# Patient Record
Sex: FEMALE | Race: WHITE | NOT HISPANIC OR LATINO | Employment: OTHER | URBAN - METROPOLITAN AREA
[De-identification: names, ages, dates, MRNs, and addresses within clinical notes are randomized per-mention and may not be internally consistent; named-entity substitution may affect disease eponyms.]

---

## 2017-01-18 ENCOUNTER — TELEPHONE (OUTPATIENT)
Dept: OBSTETRICS AND GYNECOLOGY | Facility: CLINIC | Age: 72
End: 2017-01-18

## 2017-01-18 NOTE — TELEPHONE ENCOUNTER
Call to pt regarding lab results she mailed to us - results scanned into chart 1/5/17. Per Dr. Lentz:  pt notified to continue current Ca++/Vit D regimen. Pt voices understanding.

## 2017-07-17 ENCOUNTER — TRANSCRIBE ORDERS (OUTPATIENT)
Dept: ADMINISTRATIVE | Facility: HOSPITAL | Age: 72
End: 2017-07-17

## 2017-07-17 DIAGNOSIS — Z12.31 VISIT FOR SCREENING MAMMOGRAM: Primary | ICD-10-CM

## 2017-07-25 ENCOUNTER — HOSPITAL ENCOUNTER (OUTPATIENT)
Dept: MAMMOGRAPHY | Facility: HOSPITAL | Age: 72
Discharge: HOME | End: 2017-07-25
Attending: OBSTETRICS & GYNECOLOGY

## 2017-07-25 DIAGNOSIS — Z12.31 VISIT FOR SCREENING MAMMOGRAM: ICD-10-CM

## 2017-08-15 ENCOUNTER — HOSPITAL ENCOUNTER (OUTPATIENT)
Dept: MAMMOGRAPHY | Facility: HOSPITAL | Age: 72
Discharge: HOME | End: 2017-08-15
Attending: OBSTETRICS & GYNECOLOGY | Admitting: OBSTETRICS & GYNECOLOGY

## 2017-08-15 PROCEDURE — 77063 BREAST TOMOSYNTHESIS BI: CPT | Performed by: RADIOLOGY

## 2017-08-15 PROCEDURE — 77063 BREAST TOMOSYNTHESIS BI: CPT

## 2017-08-15 PROCEDURE — G0202 SCR MAMMO BI INCL CAD: HCPCS

## 2017-08-15 PROCEDURE — G0202 SCR MAMMO BI INCL CAD: HCPCS | Performed by: RADIOLOGY

## 2017-10-05 ENCOUNTER — OFFICE VISIT (OUTPATIENT)
Dept: OBSTETRICS AND GYNECOLOGY | Facility: CLINIC | Age: 72
End: 2017-10-05

## 2017-10-05 VITALS
HEIGHT: 64 IN | BODY MASS INDEX: 20.55 KG/M2 | SYSTOLIC BLOOD PRESSURE: 110 MMHG | WEIGHT: 120.4 LBS | DIASTOLIC BLOOD PRESSURE: 72 MMHG

## 2017-10-05 DIAGNOSIS — N95.2 VAGINAL ATROPHY: ICD-10-CM

## 2017-10-05 DIAGNOSIS — M81.0 AGE-RELATED OSTEOPOROSIS WITHOUT CURRENT PATHOLOGICAL FRACTURE: ICD-10-CM

## 2017-10-05 DIAGNOSIS — Z78.0 MENOPAUSE: Primary | ICD-10-CM

## 2017-10-05 DIAGNOSIS — N60.11 FIBROCYSTIC BREAST CHANGES, BILATERAL: ICD-10-CM

## 2017-10-05 DIAGNOSIS — N60.12 FIBROCYSTIC BREAST CHANGES, BILATERAL: ICD-10-CM

## 2017-10-05 PROCEDURE — 99214 OFFICE O/P EST MOD 30 MIN: CPT | Performed by: OBSTETRICS & GYNECOLOGY

## 2017-10-05 NOTE — PROGRESS NOTES
Chief Complaint   Patient presents with   • Gynecologic Exam   • Med Refill       Holly Davis is a 71 y.o. year old  presenting to be seen because of follow-up for menopause, vaginal atrophy which is currently treated with Premarin vaginal cream, osteoporosis with a history of 2 stress fractures, and fibrocystic changes of the breast.  This patient takes Evista 60 mg daily.  She has partial response to Premarin vaginal cream.  She has had no recent fractures.       SCREENING TESTS    Year 2012   Age                         PAP                         HPV high risk                         Mammogram     benign benign                   SWATHI score                         Breast MRI                         Lipids                         Vitamin D                         Colonoscopy                         DEXA  Frax (hip/any)     osteopenia                    Ovarian Screen                             No Additional Complaints Reported    The following portions of the patient's history were reviewed and updated as appropriate:vital signs and   She  does not have any pertinent problems on file.  She  has a past surgical history that includes Breast excisional biopsy; Hysterectomy (Bilateral); Breast biopsy (Left); Total abdominal hysterectomy w/ bilateral salpingoophorectomy (Bilateral); Knee cartilage surgery; Cataract extraction w/ intraocular lens  implant, bilateral; Appendectomy; Back surgery; Colonoscopy; and knee arthroplasty, partial replacement (Right).  Her family history includes Breast cancer (age of onset: 60) in her cousin; Breast cancer (age of onset: 64) in her maternal aunt. There is no history of Ovarian cancer.  She  reports that she has never smoked. She has never used smokeless tobacco. She reports that she drinks alcohol. She reports that she does not use illicit drugs.  Current  "Outpatient Prescriptions   Medication Sig Dispense Refill   • Calcium-Magnesium-Vitamin D - MG-MG-UNIT tablet sustained-release 24 hour Take 1 tablet by mouth Daily.     • fluvastatin XL (LESCOL XL) 80 MG 24 hr tablet Take 1 tablet by mouth Daily.  0   • metoprolol succinate XL (TOPROL-XL) 25 MG 24 hr tablet Take 1 tablet by mouth Daily.  0   • Prasterone 6.5 MG insert Insert 1 suppository into the vagina Daily. 30 each 12   • Probiotic Product (PROBIOTIC DAILY PO) Take 1 tablet by mouth Daily.     • raloxifene (EVISTA) 60 MG tablet TAKE 1 TABLET BY MOUTH DAILY. 90 tablet 3   • RESTASIS 0.05 % ophthalmic emulsion Administer 1 drop to both eyes 2 (Two) Times a Day.  3   • senna-docusate (PERICOLACE) 8.6-50 MG per tablet Take 1 tablet by mouth Daily.     • temazepam (RESTORIL) 15 MG capsule Take 1 capsule by mouth As Needed.  1   • traZODone (DESYREL) 100 MG tablet Take 1 tablet by mouth Daily.  0   • Vitamin D, Cholecalciferol, 1000 UNITS capsule Take 1,000 Int'l Units by mouth Daily.       No current facility-administered medications for this visit.      She is allergic to cefdinir; midazolam; other; and sulfa antibiotics..        Review of Systems  A comprehensive review of systems was done.  Constitutional: negative for fever, chills, activity change, appetite change, fatigue and unexpected weight change.  Respiratory: negative  Cardiovascular: negative  Gastrointestinal: positive for constipation  Genitourinary:negative  Musculoskeletal:negative  Behavioral/Psych: negative          /72  Ht 64\" (162.6 cm)  Wt 120 lb 6.4 oz (54.6 kg)  LMP  (LMP Unknown)  BMI 20.67 kg/m2    Physical Exam    General:  alert; cooperative; well developed; well nourished   Skin:  No suspicious lesions seen   Thyroid: normal to inspection and palpation   Lungs:  clear to auscultation bilaterally   Heart:  regular rate and rhythm, S1, S2 normal, no murmur, click, rub or gallop   Breasts:  Examined in supine " position  Symmetric without masses or skin dimpling  Nipples normal without inversion, lesions or discharge  There are no palpable axillary nodes   Abdomen: soft, non-tender; no masses  no umbilical or inginual hernias are present  no hepato-splenomegaly   Pelvis: Clinical staff was present for exam  External genitalia:  normal appearance of the external genitalia including Bartholin's and Acorn's glands.  Vaginal:  atrophic mucosal changes are present;  Cervix:  normal appearance.  Uterus:  absent.  Adnexa:  absent, bilateral.  Rectal:  anus visually normal appearing. recto-vaginal exam unremarkable and confirms findings;                              Cervix is absent  Lab Review   CMP results and lipid panel results    Imaging   Mammogram results    Medical counseling was given to patient for the following topics: diagnostic results, instructions for management, risk factor reductions, prognosis, risks and benefits of treatment options and importance of treatment compliance . Total time of the encounter was 26 minute(s) and 15 minute(s)  was spent in face to face counseling.  I have counseled the patient that I would like to change her to Intrarosa suppositories (DHEA) since she is on an estrogen receptor modulator (Evista).  I have counseled her in monthly self breast assessment and annual breast imaging.  I counseled her in fall prevention.  I counseled her to continue Evista 60 mg daily and to have a repeat DEXA in 1 year.          ASSESSMENT  Problems Addressed this Visit        Musculoskeletal and Integument    Osteoporosis       Genitourinary    Menopause - Primary    Vaginal atrophy    Relevant Medications    Prasterone 6.5 MG insert       Other    Fibrocystic breast changes, bilateral            PLAN    Medications prescribed this encounter:    New Medications Ordered This Visit   Medications   • Prasterone 6.5 MG insert     Sig: Insert 1 suppository into the vagina Daily.     Dispense:  30 each     Refill:   12   ·   · Follow up: 12 month(s)  · Calcium, 600 mg/ Vit. D, 400 IU daily     *Please note that portions of this documentation may have been completed with a voice recognition program.  Efforts were made to edit this dictation, but occasional words may have been mistranscribed.     This note was electronically signed.    LEA Lentz MD  October 5, 2017  1:43 PM

## 2017-10-12 RX ORDER — RALOXIFENE HYDROCHLORIDE 60 MG/1
60 TABLET, FILM COATED ORAL DAILY
Qty: 90 TABLET | Refills: 3 | Status: SHIPPED | OUTPATIENT
Start: 2017-10-12 | End: 2018-10-15 | Stop reason: SDUPTHER

## 2017-10-12 NOTE — TELEPHONE ENCOUNTER
Patient was seen for her annual exam this week, and was unsure if Dr. Lentz or her PCP had been prescribing Evista.  After going home and checking her prescription bottle, she realized that Dr. Lentz has been prescribing and needs a new prescription sent to her pharmacy.

## 2017-11-13 ENCOUNTER — LAB (OUTPATIENT)
Dept: LAB | Facility: HOSPITAL | Age: 72
End: 2017-11-13

## 2017-11-13 DIAGNOSIS — D47.2 MONOCLONAL GAMMOPATHY: ICD-10-CM

## 2017-11-13 LAB
ALBUMIN SERPL-MCNC: 4.2 G/DL (ref 3.2–4.8)
ALBUMIN/GLOB SERPL: 1.7 G/DL (ref 1.5–2.5)
ALP SERPL-CCNC: 105 U/L (ref 25–100)
ALT SERPL W P-5'-P-CCNC: 18 U/L (ref 7–40)
ANION GAP SERPL CALCULATED.3IONS-SCNC: 5 MMOL/L (ref 3–11)
AST SERPL-CCNC: 19 U/L (ref 0–33)
BILIRUB SERPL-MCNC: 0.6 MG/DL (ref 0.3–1.2)
BUN BLD-MCNC: 18 MG/DL (ref 9–23)
BUN/CREAT SERPL: 25.7 (ref 7–25)
CALCIUM SPEC-SCNC: 9.3 MG/DL (ref 8.7–10.4)
CHLORIDE SERPL-SCNC: 111 MMOL/L (ref 99–109)
CO2 SERPL-SCNC: 28 MMOL/L (ref 20–31)
CREAT BLD-MCNC: 0.7 MG/DL (ref 0.6–1.3)
CRP SERPL-MCNC: 0.03 MG/DL (ref 0–1)
ERYTHROCYTE [DISTWIDTH] IN BLOOD BY AUTOMATED COUNT: 17 % (ref 11.3–14.5)
ERYTHROCYTE [SEDIMENTATION RATE] IN BLOOD: 3 MM/HR (ref 0–30)
GFR SERPL CREATININE-BSD FRML MDRD: 82 ML/MIN/1.73
GLOBULIN UR ELPH-MCNC: 2.5 GM/DL
GLUCOSE BLD-MCNC: 90 MG/DL (ref 70–100)
HCT VFR BLD AUTO: 38.6 % (ref 34.5–44)
HGB BLD-MCNC: 12.2 G/DL (ref 11.5–15.5)
LYMPHOCYTES # BLD AUTO: 2 10*3/MM3 (ref 0.6–4.8)
LYMPHOCYTES NFR BLD AUTO: 38.4 % (ref 24–44)
MCH RBC QN AUTO: 28.6 PG (ref 27–31)
MCHC RBC AUTO-ENTMCNC: 31.7 G/DL (ref 32–36)
MCV RBC AUTO: 90.2 FL (ref 80–99)
MONOCYTES # BLD AUTO: 0.4 10*3/MM3 (ref 0–1)
MONOCYTES NFR BLD AUTO: 7.8 % (ref 0–12)
NEUTROPHILS # BLD AUTO: 2.9 10*3/MM3 (ref 1.5–8.3)
NEUTROPHILS NFR BLD AUTO: 53.8 % (ref 41–71)
PLATELET # BLD AUTO: 164 10*3/MM3 (ref 150–450)
PMV BLD AUTO: 8 FL (ref 6–12)
POTASSIUM BLD-SCNC: 4.3 MMOL/L (ref 3.5–5.5)
PROT SERPL-MCNC: 6.7 G/DL (ref 5.7–8.2)
RBC # BLD AUTO: 4.28 10*6/MM3 (ref 3.89–5.14)
SODIUM BLD-SCNC: 144 MMOL/L (ref 132–146)
WBC NRBC COR # BLD: 5.3 10*3/MM3 (ref 3.5–10.8)

## 2017-11-13 PROCEDURE — 83883 ASSAY NEPHELOMETRY NOT SPEC: CPT | Performed by: INTERNAL MEDICINE

## 2017-11-13 PROCEDURE — 36415 COLL VENOUS BLD VENIPUNCTURE: CPT

## 2017-11-13 PROCEDURE — 86140 C-REACTIVE PROTEIN: CPT | Performed by: INTERNAL MEDICINE

## 2017-11-13 PROCEDURE — 84165 PROTEIN E-PHORESIS SERUM: CPT | Performed by: INTERNAL MEDICINE

## 2017-11-13 PROCEDURE — 80053 COMPREHEN METABOLIC PANEL: CPT | Performed by: INTERNAL MEDICINE

## 2017-11-13 PROCEDURE — 86334 IMMUNOFIX E-PHORESIS SERUM: CPT | Performed by: INTERNAL MEDICINE

## 2017-11-13 PROCEDURE — 85025 COMPLETE CBC W/AUTO DIFF WBC: CPT

## 2017-11-13 PROCEDURE — 85652 RBC SED RATE AUTOMATED: CPT | Performed by: INTERNAL MEDICINE

## 2017-11-14 ENCOUNTER — OFFICE VISIT (OUTPATIENT)
Dept: ONCOLOGY | Facility: CLINIC | Age: 72
End: 2017-11-14

## 2017-11-14 VITALS
RESPIRATION RATE: 14 BRPM | TEMPERATURE: 97.9 F | WEIGHT: 121 LBS | HEIGHT: 64 IN | HEART RATE: 78 BPM | SYSTOLIC BLOOD PRESSURE: 139 MMHG | BODY MASS INDEX: 20.66 KG/M2 | DIASTOLIC BLOOD PRESSURE: 68 MMHG

## 2017-11-14 DIAGNOSIS — D47.2 MONOCLONAL GAMMOPATHY: Primary | ICD-10-CM

## 2017-11-14 LAB
ALBUMIN SERPL-MCNC: 3.9 G/DL (ref 2.9–4.4)
ALBUMIN/GLOB SERPL: 1.6 {RATIO} (ref 0.7–1.7)
ALPHA1 GLOB FLD ELPH-MCNC: 0.4 G/DL (ref 0–0.4)
ALPHA2 GLOB SERPL ELPH-MCNC: 0.6 G/DL (ref 0.4–1)
B-GLOBULIN SERPL ELPH-MCNC: 0.8 G/DL (ref 0.7–1.3)
GAMMA GLOB SERPL ELPH-MCNC: 0.9 G/DL (ref 0.4–1.8)
GLOBULIN SER CALC-MCNC: 2.6 G/DL (ref 2.2–3.9)
IGA SERPL-MCNC: 87 MG/DL (ref 64–422)
IGG SERPL-MCNC: 835 MG/DL (ref 700–1600)
IGM SERPL-MCNC: 131 MG/DL (ref 26–217)
INTERPRETATION SERPL IEP-IMP: ABNORMAL
KAPPA LC SERPL-MCNC: 11.4 MG/L (ref 3.3–19.4)
KAPPA LC/LAMBDA SER: 0.67 {RATIO} (ref 0.26–1.65)
LAMBDA LC FREE SERPL-MCNC: 17 MG/L (ref 5.7–26.3)
Lab: ABNORMAL
M-SPIKE: 0.5 G/DL
PROT SERPL-MCNC: 6.5 G/DL (ref 6–8.5)

## 2017-11-14 PROCEDURE — 99213 OFFICE O/P EST LOW 20 MIN: CPT | Performed by: INTERNAL MEDICINE

## 2017-11-14 NOTE — PROGRESS NOTES
Chief Complaint   Follow-up MGUS    PROBLEM LIST   Patient Active Problem List   Diagnosis   • Stress fracture of foot   • Stress fracture of ankle   • Sinus tachycardia   • Osteoporosis   • Monoclonal gammopathy   • Menopause   • Vaginal atrophy   • Fibrocystic breast changes, bilateral       HISTORY OF PRESENT ILLNESS:   This delightful lady returns for follow-up.  I see her once a year regarding her MGUS.  She has enjoyed good health.  She had a partial knee replacement in June on the right and she is delighted with results she has had no significant infectious illnesses.  She's had no new bony or neurologic symptoms    Past Medical History, Past Surgical History, Social History, Family History have been reviewed and are without significant changes except as mentioned.      Medications:      Current Outpatient Prescriptions:   •  Calcium-Magnesium-Vitamin D - MG-MG-UNIT tablet sustained-release 24 hour, Take 1 tablet by mouth Daily., Disp: , Rfl:   •  fluvastatin XL (LESCOL XL) 80 MG 24 hr tablet, Take 1 tablet by mouth Daily., Disp: , Rfl: 0  •  metoprolol succinate XL (TOPROL-XL) 25 MG 24 hr tablet, Take 1 tablet by mouth Daily., Disp: , Rfl: 0  •  Prasterone 6.5 MG insert, Insert 1 suppository into the vagina Daily., Disp: 30 each, Rfl: 12  •  Probiotic Product (PROBIOTIC DAILY PO), Take 1 tablet by mouth Daily., Disp: , Rfl:   •  raloxifene (EVISTA) 60 MG tablet, Take 1 tablet by mouth Daily., Disp: 90 tablet, Rfl: 3  •  RESTASIS 0.05 % ophthalmic emulsion, Administer 1 drop to both eyes 2 (Two) Times a Day., Disp: , Rfl: 3  •  senna-docusate (PERICOLACE) 8.6-50 MG per tablet, Take 1 tablet by mouth Daily., Disp: , Rfl:   •  temazepam (RESTORIL) 15 MG capsule, Take 1 capsule by mouth As Needed., Disp: , Rfl: 1  •  traZODone (DESYREL) 100 MG tablet, Take 1 tablet by mouth Daily., Disp: , Rfl: 0  •  Vitamin D, Cholecalciferol, 1000 UNITS capsule, Take 1,000 Int'l Units by mouth Daily., Disp: , Rfl:  "    ALLERGIES:    Allergies   Allergen Reactions   • Cefdinir Other (See Comments)     \"OMNICEF (CEFDINIR) GAVE ME C-DIFF-SEVERAL YEARS AGO\"   • Midazolam Other (See Comments)     \"LAST TIME TOOK VERSED,COULD NOT BREATHE,WAS GIVEN REVERSAL DRUG\"   • Other      OMNI IV   • Sulfa Antibiotics Rash       ROS:  Review of Systems   Constitutional: Negative for activity change and appetite change.        Energy level is good   HENT: Negative for mouth sores, sinus pressure and voice change.    Eyes: Negative for visual disturbance.   Respiratory: Negative for shortness of breath.    Cardiovascular: Negative for chest pain.   Gastrointestinal: Negative for abdominal pain and vomiting.   Genitourinary: Negative for dysuria.   Musculoskeletal: Negative for arthralgias and myalgias.        Right knee surgery mentioned   Skin: Negative for color change.   Neurological: Negative for dizziness, syncope and headaches.   Hematological: Negative for adenopathy.   Psychiatric/Behavioral: Negative for confusion, sleep disturbance and suicidal ideas. The patient is not nervous/anxious.            Objective:    /68  Pulse 78  Temp 97.9 °F (36.6 °C)  Resp 14  Ht 64\" (162.6 cm)  Wt 121 lb (54.9 kg)  LMP  (LMP Unknown)  BMI 20.77 kg/m2    Physical Exam   Constitutional: She is oriented to person, place, and time. She appears well-developed and well-nourished. No distress.   Healthy vigorous woman who looks at least 10 years younger than her age   HENT:   Head: Normocephalic.   Mouth/Throat: Oropharynx is clear and moist.   Eyes: Conjunctivae and EOM are normal. No scleral icterus.   Neck: Normal range of motion. Neck supple. No thyromegaly present.   Cardiovascular: Normal rate, regular rhythm and normal heart sounds.    No murmur heard.  Pulmonary/Chest: Effort normal and breath sounds normal. She has no wheezes. She has no rales.   Abdominal: Soft. Bowel sounds are normal. She exhibits no distension and no mass. There is no " tenderness.   Musculoskeletal: She exhibits no edema or tenderness.   Lymphadenopathy:     She has no cervical adenopathy.   Neurological: She is alert and oriented to person, place, and time. She displays normal reflexes. No cranial nerve deficit.   Skin: Skin is warm and dry. No rash noted.   Psychiatric: She has a normal mood and affect. Judgment normal.   Vitals reviewed.             RECENT LABS:  Hematology WBC   Date Value Ref Range Status   11/13/2017 5.30 3.50 - 10.80 10*3/mm3 Final     Hemoglobin   Date Value Ref Range Status   11/13/2017 12.2 11.5 - 15.5 g/dL Final     Hematocrit   Date Value Ref Range Status   11/13/2017 38.6 34.5 - 44.0 % Final     MCV   Date Value Ref Range Status   11/13/2017 90.2 80.0 - 99.0 fL Final     RDW   Date Value Ref Range Status   11/13/2017 17.0 (H) 11.3 - 14.5 % Final     MPV   Date Value Ref Range Status   11/13/2017 8.0 6.0 - 12.0 fL Final     Platelets   Date Value Ref Range Status   11/13/2017 164 150 - 450 10*3/mm3 Final     Immature Grans %   Date Value Ref Range Status   11/09/2016 0.2 0.0 - 0.6 % Final     Neutrophils, Absolute   Date Value Ref Range Status   11/13/2017 2.90 1.50 - 8.30 10*3/mm3 Final     Lymphocytes, Absolute   Date Value Ref Range Status   11/13/2017 2.00 0.60 - 4.80 10*3/mm3 Final     Monocytes, Absolute   Date Value Ref Range Status   11/13/2017 0.40 0.00 - 1.00 10*3/mm3 Final     Eosinophils, Absolute   Date Value Ref Range Status   11/09/2016 0.08 (L) 0.10 - 0.30 10*3/mm3 Final     Basophils, Absolute   Date Value Ref Range Status   11/09/2016 0.03 0.00 - 0.20 10*3/mm3 Final     Immature Grans, Absolute   Date Value Ref Range Status   11/09/2016 0.01 0.00 - 0.03 10*3/mm3 Final       Glucose   Date Value Ref Range Status   11/13/2017 90 70 - 100 mg/dL Final     Sodium   Date Value Ref Range Status   11/13/2017 144 132 - 146 mmol/L Final     Potassium   Date Value Ref Range Status   11/13/2017 4.3 3.5 - 5.5 mmol/L Final     CO2   Date Value Ref  Range Status   11/13/2017 28.0 20.0 - 31.0 mmol/L Final     Chloride   Date Value Ref Range Status   11/13/2017 111 (H) 99 - 109 mmol/L Final     Anion Gap   Date Value Ref Range Status   11/13/2017 5.0 3.0 - 11.0 mmol/L Final     Creatinine   Date Value Ref Range Status   11/13/2017 0.70 0.60 - 1.30 mg/dL Final     BUN   Date Value Ref Range Status   11/13/2017 18 9 - 23 mg/dL Final     BUN/Creatinine Ratio   Date Value Ref Range Status   11/13/2017 25.7 (H) 7.0 - 25.0 Final     Calcium   Date Value Ref Range Status   11/13/2017 9.3 8.7 - 10.4 mg/dL Final     eGFR Non  Amer   Date Value Ref Range Status   11/13/2017 82 >60 mL/min/1.73 Final     Alkaline Phosphatase   Date Value Ref Range Status   11/13/2017 105 (H) 25 - 100 U/L Final     Total Protein   Date Value Ref Range Status   11/13/2017 6.7 5.7 - 8.2 g/dL Final     ALT (SGPT)   Date Value Ref Range Status   11/13/2017 18 7 - 40 U/L Final     AST (SGOT)   Date Value Ref Range Status   11/13/2017 19 0 - 33 U/L Final     Total Bilirubin   Date Value Ref Range Status   11/13/2017 0.6 0.3 - 1.2 mg/dL Final     Albumin   Date Value Ref Range Status   11/13/2017 4.20 3.20 - 4.80 g/dL Final     Globulin   Date Value Ref Range Status   11/13/2017 2.5 gm/dL Final     A/G Ratio   Date Value Ref Range Status   11/13/2017 1.7 1.5 - 2.5 g/dL Final          Perf Status: 0    Assessment/Plan    Diagnoses and all orders for this visit:    Monoclonal gammopathy      Patient is doing superbly.  All test or not in but those that are have not shown any evolution.  Assuming her remaining labs are okay, I will call her in 2 days to let her know and I'll otherwise see her back in a year.      Maxime Morse MD , 11/14/2017    CC:

## 2018-07-16 ENCOUNTER — TRANSCRIBE ORDERS (OUTPATIENT)
Dept: ADMINISTRATIVE | Facility: HOSPITAL | Age: 73
End: 2018-07-16

## 2018-07-16 DIAGNOSIS — Z12.31 VISIT FOR SCREENING MAMMOGRAM: Primary | ICD-10-CM

## 2018-07-23 DIAGNOSIS — M85.89 OSTEOPENIA OF MULTIPLE SITES: Primary | ICD-10-CM

## 2018-08-22 ENCOUNTER — HOSPITAL ENCOUNTER (OUTPATIENT)
Dept: MAMMOGRAPHY | Facility: HOSPITAL | Age: 73
Discharge: HOME | End: 2018-08-22
Attending: OBSTETRICS & GYNECOLOGY | Admitting: OBSTETRICS & GYNECOLOGY

## 2018-08-22 DIAGNOSIS — Z12.31 VISIT FOR SCREENING MAMMOGRAM: ICD-10-CM

## 2018-08-22 PROCEDURE — 77063 BREAST TOMOSYNTHESIS BI: CPT | Performed by: RADIOLOGY

## 2018-08-22 PROCEDURE — 77063 BREAST TOMOSYNTHESIS BI: CPT

## 2018-08-22 PROCEDURE — 77067 SCR MAMMO BI INCL CAD: CPT | Performed by: RADIOLOGY

## 2018-08-22 PROCEDURE — 77067 SCR MAMMO BI INCL CAD: CPT

## 2018-10-15 ENCOUNTER — HOSPITAL ENCOUNTER (OUTPATIENT)
Dept: BONE DENSITY | Facility: HOSPITAL | Age: 73
Discharge: HOME | End: 2018-10-15
Attending: OBSTETRICS & GYNECOLOGY | Admitting: OBSTETRICS & GYNECOLOGY

## 2018-10-15 ENCOUNTER — OFFICE VISIT (OUTPATIENT)
Dept: OBSTETRICS AND GYNECOLOGY | Facility: CLINIC | Age: 73
End: 2018-10-15

## 2018-10-15 VITALS
DIASTOLIC BLOOD PRESSURE: 56 MMHG | WEIGHT: 119.4 LBS | SYSTOLIC BLOOD PRESSURE: 102 MMHG | BODY MASS INDEX: 20.38 KG/M2 | HEIGHT: 64 IN

## 2018-10-15 DIAGNOSIS — M81.0 AGE-RELATED OSTEOPOROSIS WITHOUT CURRENT PATHOLOGICAL FRACTURE: ICD-10-CM

## 2018-10-15 DIAGNOSIS — Z78.0 MENOPAUSE: Primary | ICD-10-CM

## 2018-10-15 DIAGNOSIS — Z01.419 ENCOUNTER FOR GYNECOLOGICAL EXAMINATION WITHOUT ABNORMAL FINDING: ICD-10-CM

## 2018-10-15 DIAGNOSIS — M85.89 OSTEOPENIA OF MULTIPLE SITES: ICD-10-CM

## 2018-10-15 DIAGNOSIS — N95.2 VAGINAL ATROPHY: ICD-10-CM

## 2018-10-15 PROCEDURE — G0101 CA SCREEN;PELVIC/BREAST EXAM: HCPCS | Performed by: OBSTETRICS & GYNECOLOGY

## 2018-10-15 PROCEDURE — 77080 DXA BONE DENSITY AXIAL: CPT

## 2018-10-15 RX ORDER — LISINOPRIL 20 MG/1
30 TABLET ORAL DAILY
COMMUNITY
Start: 2018-09-21 | End: 2019-10-22 | Stop reason: DRUGHIGH

## 2018-10-15 RX ORDER — RALOXIFENE HYDROCHLORIDE 60 MG/1
60 TABLET, FILM COATED ORAL DAILY
Qty: 90 TABLET | Refills: 3 | Status: SHIPPED | OUTPATIENT
Start: 2018-10-15 | End: 2019-10-15

## 2018-10-15 NOTE — PROGRESS NOTES
Chief Complaint   Patient presents with   • Gynecologic Exam   • Med Refill       Holly Davis is a 72 y.o. year old  presenting to be seen for her annual exam.  This patient is menopausal.  She uses Premarin vaginal cream, 0.5 g twice a week to treat vaginal atrophy.  She has a history of osteoporosis with 2 stress fractures.  She currently takes raloxifene, 60 mg daily.  She had a DEXA today.  She has had partial replacements of both knees.  She has previously had an AH/BSO and an appendectomy.  She denies bowel or urinary symptoms.    SCREENING TESTS    Year 2012   Age                         PAP                         HPV high risk                         Mammogram      benign benign                  SWATHI score                         Breast MRI                         Lipids                         Vitamin D                         Colonoscopy                         DEXA  Frax (hip/any)     osteopenia  *                  Ovarian Screen                             She exercises regularly: yes.  She wears her seat belt: yes.  She has concerns about domestic violence: no.  She has noticed changes in height: no    GYN screening history:  · Last mammogram: was done on approximately 2018 and the result was: Birads I (Normal)..    No Additional Complaints Reported    The following portions of the patient's history were reviewed and updated as appropriate:vital signs and   She  has a past medical history of Fibrocystic breast changes, bilateral (10/4/2016); Menopause; Monoclonal gammopathy; Osteoporosis; SI (sacroiliac) pain; Sinus tachycardia; Stress fracture of ankle; and Stress fracture of foot.  She  does not have any pertinent problems on file.  She  has a past surgical history that includes Breast excisional biopsy; Hysterectomy (Bilateral); Breast biopsy (Left); Total abdominal hysterectomy  w/ bilateral salpingoophorectomy (Bilateral); Knee cartilage surgery; Cataract extraction w/ intraocular lens  implant, bilateral; Appendectomy; Back surgery; Colonoscopy; knee arthroplasty, partial replacement (Right); and Replacement total knee (Left, 04/2018).  Her family history includes Breast cancer (age of onset: 60) in her cousin; Breast cancer (age of onset: 64) in her maternal aunt.  She  reports that she has never smoked. She has never used smokeless tobacco. She reports that she drinks alcohol. She reports that she does not use drugs.  Current Outpatient Prescriptions   Medication Sig Dispense Refill   • conjugated estrogens (PREMARIN) 0.625 MG/GM vaginal cream Insert 0.5 application into the vagina 2 (Two) Times a Week. 30 g 3   • Calcium-Magnesium-Vitamin D - MG-MG-UNIT tablet sustained-release 24 hour Take 1 tablet by mouth Daily.     • fluvastatin XL (LESCOL XL) 80 MG 24 hr tablet Take 1 tablet by mouth Daily.  0   • lisinopril (PRINIVIL,ZESTRIL) 20 MG tablet Take 1.5 tablets by mouth Daily.     • metoprolol succinate XL (TOPROL-XL) 25 MG 24 hr tablet Take 1 tablet by mouth Daily.  0   • Probiotic Product (PROBIOTIC DAILY PO) Take 1 tablet by mouth Daily.     • raloxifene (EVISTA) 60 MG tablet Take 1 tablet by mouth Daily. 90 tablet 3   • RESTASIS 0.05 % ophthalmic emulsion Administer 1 drop to both eyes 2 (Two) Times a Day.  3   • senna-docusate (PERICOLACE) 8.6-50 MG per tablet Take 1 tablet by mouth Daily.     • temazepam (RESTORIL) 15 MG capsule Take 1 capsule by mouth As Needed.  1   • traZODone (DESYREL) 100 MG tablet Take 1 tablet by mouth Daily.  0   • Vitamin D, Cholecalciferol, 1000 UNITS capsule Take 1,000 Int'l Units by mouth Daily.       No current facility-administered medications for this visit.      She is allergic to cefdinir; midazolam; other; and sulfa antibiotics..    Review of Systems  A comprehensive review of systems was taken.  Constitutional: negative for fever,  "chills, activity change, appetite change, fatigue and unexpected weight change.  Respiratory: negative  Cardiovascular: negative  Gastrointestinal: negative  Genitourinary:negative  Musculoskeletal:negative  Behavioral/Psych: negative       /56   Ht 162.6 cm (64\")   Wt 54.2 kg (119 lb 6.4 oz)   LMP  (LMP Unknown)   BMI 20.49 kg/m²     Physical Exam    General:  alert; cooperative; well developed; well nourished   Skin:  No suspicious lesions seen   Thyroid: normal to inspection and palpation   Lungs:  clear to auscultation bilaterally   Heart:  regular rate and rhythm, S1, S2 normal, no murmur, click, rub or gallop   Breasts:  Examined in supine position  Symmetric without masses or skin dimpling  Nipples normal without inversion, lesions or discharge  There are no palpable axillary nodes  Fibrocystic changes are present both breasts without a discrete mass   Abdomen: soft, non-tender; no masses  no umbilical or inginual hernias are present  no hepato-splenomegaly   Pelvis: Clinical staff was present for exam  External genitalia:  normal appearance of the external genitalia including Bartholin's and Alfred's glands.  Vaginal:  normal pink mucosa without prolapse or lesions.  Cervix:  absent.  Uterus:  absent.  Adnexa:  absent, bilateral.  Rectal:  anus visually normal appearing. recto-vaginal exam unremarkable and confirms findings;     Lab Review   No data reviewed    Imaging  Mammogram results         ASSESSMENT  Problems Addressed this Visit        Musculoskeletal and Integument    Osteoporosis    Relevant Medications    raloxifene (EVISTA) 60 MG tablet       Genitourinary    Menopause - Primary    Vaginal atrophy    Relevant Medications    conjugated estrogens (PREMARIN) 0.625 MG/GM vaginal cream      Other Visit Diagnoses     Encounter for gynecological examination without abnormal finding              PLAN    Medications prescribed this encounter:    New Medications Ordered This Visit   Medications "   • conjugated estrogens (PREMARIN) 0.625 MG/GM vaginal cream     Sig: Insert 0.5 application into the vagina 2 (Two) Times a Week.     Dispense:  30 g     Refill:  3   • raloxifene (EVISTA) 60 MG tablet     Sig: Take 1 tablet by mouth Daily.     Dispense:  90 tablet     Refill:  3   · DEXA today  · Monthly self breast assessment and annual breast imaging  · Calcium, 600 mg/ Vit. D, 400 IU daily; regular weight-bearing exercise  · Follow up: 12 month(s)  *Please note that portions of this documentation may have been completed with a voice recognition program.  Efforts were made to edit this dictation, but occasional words may have been mistranscribed.       This note was electronically signed.    LEA Lentz MD  October 15, 2018  1:32 PM

## 2018-11-07 ENCOUNTER — TELEPHONE (OUTPATIENT)
Dept: ONCOLOGY | Facility: CLINIC | Age: 73
End: 2018-11-07

## 2018-11-07 DIAGNOSIS — D47.2 MONOCLONAL GAMMOPATHY: Primary | ICD-10-CM

## 2018-11-07 NOTE — TELEPHONE ENCOUNTER
----- Message from Dina HERMILO Dorado sent at 11/6/2018  2:13 PM EST -----  Regarding: [facility]  Contact: [phone]  Patient is seeing Dr. Monreal next Wednesday and wants to get labs drawn tomorrow, but the orders are still under Dr. Morse please change. Patient wants to come tomorrow.

## 2018-11-08 ENCOUNTER — LAB (OUTPATIENT)
Dept: LAB | Facility: HOSPITAL | Age: 73
End: 2018-11-08

## 2018-11-08 DIAGNOSIS — D47.2 MONOCLONAL GAMMOPATHY: ICD-10-CM

## 2018-11-08 LAB
ALBUMIN SERPL-MCNC: 4.19 G/DL (ref 3.2–4.8)
ALBUMIN/GLOB SERPL: 2.2 G/DL (ref 1.5–2.5)
ALP SERPL-CCNC: 87 U/L (ref 25–100)
ALT SERPL W P-5'-P-CCNC: 17 U/L (ref 7–40)
ANION GAP SERPL CALCULATED.3IONS-SCNC: 6 MMOL/L (ref 3–11)
AST SERPL-CCNC: 17 U/L (ref 0–33)
BILIRUB SERPL-MCNC: 0.6 MG/DL (ref 0.3–1.2)
BUN BLD-MCNC: 14 MG/DL (ref 9–23)
BUN/CREAT SERPL: 23.3 (ref 7–25)
CALCIUM SPEC-SCNC: 9 MG/DL (ref 8.7–10.4)
CHLORIDE SERPL-SCNC: 107 MMOL/L (ref 99–109)
CO2 SERPL-SCNC: 28 MMOL/L (ref 20–31)
CREAT BLD-MCNC: 0.6 MG/DL (ref 0.6–1.3)
CRP SERPL-MCNC: 0.02 MG/DL (ref 0–1)
ERYTHROCYTE [DISTWIDTH] IN BLOOD BY AUTOMATED COUNT: 16.5 % (ref 11.3–14.5)
ERYTHROCYTE [SEDIMENTATION RATE] IN BLOOD: 3 MM/HR (ref 0–30)
GFR SERPL CREATININE-BSD FRML MDRD: 98 ML/MIN/1.73
GLOBULIN UR ELPH-MCNC: 1.9 GM/DL
GLUCOSE BLD-MCNC: 94 MG/DL (ref 70–100)
HCT VFR BLD AUTO: 36.7 % (ref 34.5–44)
HGB BLD-MCNC: 12.2 G/DL (ref 11.5–15.5)
LYMPHOCYTES # BLD AUTO: 2.2 10*3/MM3 (ref 0.6–4.8)
LYMPHOCYTES NFR BLD AUTO: 37.8 % (ref 24–44)
MCH RBC QN AUTO: 30.6 PG (ref 27–31)
MCHC RBC AUTO-ENTMCNC: 33.2 G/DL (ref 32–36)
MCV RBC AUTO: 92.4 FL (ref 80–99)
MONOCYTES # BLD AUTO: 0.4 10*3/MM3 (ref 0–1)
MONOCYTES NFR BLD AUTO: 6.2 % (ref 0–12)
NEUTROPHILS # BLD AUTO: 3.3 10*3/MM3 (ref 1.5–8.3)
NEUTROPHILS NFR BLD AUTO: 56 % (ref 41–71)
PLATELET # BLD AUTO: 171 10*3/MM3 (ref 150–450)
PMV BLD AUTO: 8.1 FL (ref 6–12)
POTASSIUM BLD-SCNC: 4.4 MMOL/L (ref 3.5–5.5)
PROT SERPL-MCNC: 6.1 G/DL (ref 5.7–8.2)
RBC # BLD AUTO: 3.97 10*6/MM3 (ref 3.89–5.14)
SODIUM BLD-SCNC: 141 MMOL/L (ref 132–146)
WBC NRBC COR # BLD: 5.9 10*3/MM3 (ref 3.5–10.8)

## 2018-11-08 PROCEDURE — 82232 ASSAY OF BETA-2 PROTEIN: CPT

## 2018-11-08 PROCEDURE — 83883 ASSAY NEPHELOMETRY NOT SPEC: CPT

## 2018-11-08 PROCEDURE — 80053 COMPREHEN METABOLIC PANEL: CPT

## 2018-11-08 PROCEDURE — 82784 ASSAY IGA/IGD/IGG/IGM EACH: CPT

## 2018-11-08 PROCEDURE — 85025 COMPLETE CBC W/AUTO DIFF WBC: CPT

## 2018-11-08 PROCEDURE — 85652 RBC SED RATE AUTOMATED: CPT

## 2018-11-08 PROCEDURE — 84165 PROTEIN E-PHORESIS SERUM: CPT

## 2018-11-08 PROCEDURE — 86334 IMMUNOFIX E-PHORESIS SERUM: CPT

## 2018-11-08 PROCEDURE — 36415 COLL VENOUS BLD VENIPUNCTURE: CPT

## 2018-11-08 PROCEDURE — 86140 C-REACTIVE PROTEIN: CPT

## 2018-11-09 LAB
ALBUMIN SERPL-MCNC: 3.6 G/DL (ref 2.9–4.4)
ALBUMIN/GLOB SERPL: 1.4 {RATIO} (ref 0.7–1.7)
ALPHA1 GLOB FLD ELPH-MCNC: 0.3 G/DL (ref 0–0.4)
ALPHA2 GLOB SERPL ELPH-MCNC: 0.7 G/DL (ref 0.4–1)
B-GLOBULIN SERPL ELPH-MCNC: 0.9 G/DL (ref 0.7–1.3)
GAMMA GLOB SERPL ELPH-MCNC: 0.8 G/DL (ref 0.4–1.8)
GLOBULIN SER CALC-MCNC: 2.7 G/DL (ref 2.2–3.9)
IGA SERPL-MCNC: 77 MG/DL (ref 64–422)
IGG SERPL-MCNC: 828 MG/DL (ref 700–1600)
IGM SERPL-MCNC: 107 MG/DL (ref 26–217)
INTERPRETATION SERPL IEP-IMP: ABNORMAL
KAPPA LC SERPL-MCNC: 11.7 MG/L (ref 3.3–19.4)
KAPPA LC/LAMBDA SER: 0.65 {RATIO} (ref 0.26–1.65)
LAMBDA LC FREE SERPL-MCNC: 18 MG/L (ref 5.7–26.3)
Lab: ABNORMAL
M-SPIKE: 0.5 G/DL
PROT SERPL-MCNC: 6.3 G/DL (ref 6–8.5)

## 2018-11-10 LAB — B2 MICROGLOB SERPL-MCNC: 1.5 MG/L (ref 0.6–2.4)

## 2018-11-16 NOTE — PROGRESS NOTES
"      PROBLEM LIST:  1. 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  2. osteoprosis  3. Tachycardia  4. hyperlipidemia    Subjective     HISTORY OF PRESENT ILLNESS:   Holly Davis returns for follow-up.   She is transitioning care from Dr. Morse.  She is seen yearly for follow up of a low risk MGUS.  She says she has been feeling well.  No new significant bone pain.    Past Medical History, Past Surgical History, Social History, Family History have been reviewed and are without significant changes except as mentioned.    Review of Systems   A comprehensive 14 point review of systems was performed and was negative except as mentioned.    Medications:  The current medication list was reviewed in the EMR    ALLERGIES:    Allergies   Allergen Reactions   • Cefdinir Other (See Comments)     \"OMNICEF (CEFDINIR) GAVE ME C-DIFF-SEVERAL YEARS AGO\"   • Midazolam Other (See Comments)     \"LAST TIME TOOK VERSED,COULD NOT BREATHE,WAS GIVEN REVERSAL DRUG\"   • Other      OMNI IV   • Sulfa Antibiotics Rash       Objective      /51 Comment: LUE  Pulse 77   Temp 97.7 °F (36.5 °C) (Temporal)   Resp 16   Ht 162.6 cm (64\")   Wt 55.8 kg (123 lb)   LMP  (LMP Unknown)   SpO2 100% Comment: RA  BMI 21.11 kg/m²      Performance Status: 0    General: well appearing female in no acute distress  Neuro: alert and oriented  HEENT: sclera anicteric, oropharynx clear  Lymphatics: no cervical, supraclavicular, or axillary adenopathy  Cardiovascular: regular rate and rhythm, no murmurs  Lungs: clear to auscultation bilaterally  Abdomen: soft, nontender, nondistended.  No palpable organomegaly  Extremeties: no lower extremity edema  Skin: no rashes, lesions, bruising, or petechiae  Psych: mood and affect appropriate      RECENT LABS:  Results for HOLLY DAVIS (MRN [id number]) as of 11/16/2018 17:10   Ref. Range 11/8/2018 11:35 " 11/8/2018 11:35   Glucose Latest Ref Range: 70 - 100 mg/dL 94    Sodium Latest Ref Range: 132 - 146 mmol/L 141    Potassium Latest Ref Range: 3.5 - 5.5 mmol/L 4.4    CO2 Latest Ref Range: 20.0 - 31.0 mmol/L 28.0    Chloride Latest Ref Range: 99 - 109 mmol/L 107    Anion Gap Latest Ref Range: 3.0 - 11.0 mmol/L 6.0    Creatinine Latest Ref Range: 0.60 - 1.30 mg/dL 0.60    BUN Latest Ref Range: 9 - 23 mg/dL 14    BUN/Creatinine Ratio Latest Ref Range: 7.0 - 25.0  23.3    Calcium Latest Ref Range: 8.7 - 10.4 mg/dL 9.0    eGFR Non African Amer Latest Ref Range: >60 mL/min/1.73 98    Alkaline Phosphatase Latest Ref Range: 25 - 100 U/L 87    Total Protein Latest Ref Range: 6.0 - 8.5 g/dL 6.1 6.3   ALT (SGPT) Latest Ref Range: 7 - 40 U/L 17    AST (SGOT) Latest Ref Range: 0 - 33 U/L 17    Total Bilirubin Latest Ref Range: 0.3 - 1.2 mg/dL 0.6    Albumin Latest Ref Range: 2.9 - 4.4 g/dL 4.19 3.6   Globulin Latest Units: gm/dL 1.9    A/G Ratio Latest Ref Range: 0.7 - 1.7  2.2 1.4   C-Reactive Protein Latest Ref Range: 0.00 - 1.00 mg/dL 0.02    Globulin Latest Ref Range: 2.2 - 3.9 g/dL  2.7   Alpha-1-Globulin Latest Ref Range: 0.0 - 0.4 g/dL  0.3   Alpha-2-Globulin Latest Ref Range: 0.4 - 1.0 g/dL  0.7   Beta Globulin Latest Ref Range: 0.7 - 1.3 g/dL  0.9   Beta-2 Latest Ref Range: 0.6 - 2.4 mg/L 1.5    Gamma Globulin Latest Ref Range: 0.4 - 1.8 g/dL  0.8   M-Albino Latest Ref Range: Not Observed g/dL  0.5 (H)   Immunofixation Reflex, Serum Unknown  Comment   WBC Latest Ref Range: 3.50 - 10.80 10*3/mm3 5.90    RBC Latest Ref Range: 3.89 - 5.14 10*6/mm3 3.97    Hemoglobin Latest Ref Range: 11.5 - 15.5 g/dL 12.2    Hematocrit Latest Ref Range: 34.5 - 44.0 % 36.7    RDW Latest Ref Range: 11.3 - 14.5 % 16.5 (H)    MCV Latest Ref Range: 80.0 - 99.0 fL 92.4    MCH Latest Ref Range: 27.0 - 31.0 pg 30.6    MCHC Latest Ref Range: 32.0 - 36.0 g/dL 33.2    MPV Latest Ref Range: 6.0 - 12.0 fL 8.1    Platelets Latest Ref Range: 150 - 450  10*3/mm3 171    Neutrophil % Latest Ref Range: 41.0 - 71.0 % 56.0    Lymphocyte % Latest Ref Range: 24.0 - 44.0 % 37.8    Monocyte % Latest Ref Range: 0.0 - 12.0 % 6.2    Neutrophils, Absolute Latest Ref Range: 1.50 - 8.30 10*3/mm3 3.30    Lymphocytes, Absolute Latest Ref Range: 0.60 - 4.80 10*3/mm3 2.20    Monocytes, Absolute Latest Ref Range: 0.00 - 1.00 10*3/mm3 0.40    Sed Rate Latest Ref Range: 0 - 30 mm/hr 3    IgA Latest Ref Range: 64 - 422 mg/dL  77   IgG Latest Ref Range: 700 - 1600 mg/dL  828   IgM Latest Ref Range: 26 - 217 mg/dL  107   Free Light Chain, Kappa Latest Ref Range: 3.3 - 19.4 mg/L  11.7   Free Lambda Light Chains Latest Ref Range: 5.7 - 26.3 mg/L  18.0   Kappa/Lambda Ratio Latest Ref Range: 0.26 - 1.65   0.65           Assessment/Plan   Holly Davis is a 72 y.o. 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  She has no evidence of progression.  We briefly discussed some of the signs of progression to myeloma including bone lesions, hypercalcemia, anemia, or renal dysfunction.  She has been stable for 12 years and I think it's unlikely she will have progression of her disease.  We will continue to monitor once a year.    F/u in 1 year with repeat labs.                  Visit time was 15 minutes, greater than 50% spent in counseling      Nazia Monreal MD  [facility] Hematology and Oncology    11/20/2018          CC:

## 2018-11-20 ENCOUNTER — OFFICE VISIT (OUTPATIENT)
Dept: ONCOLOGY | Facility: CLINIC | Age: 73
End: 2018-11-20

## 2018-11-20 VITALS
HEART RATE: 77 BPM | TEMPERATURE: 97.7 F | WEIGHT: 123 LBS | BODY MASS INDEX: 21 KG/M2 | DIASTOLIC BLOOD PRESSURE: 51 MMHG | RESPIRATION RATE: 16 BRPM | OXYGEN SATURATION: 100 % | HEIGHT: 64 IN | SYSTOLIC BLOOD PRESSURE: 112 MMHG

## 2018-11-20 DIAGNOSIS — D47.2 MONOCLONAL GAMMOPATHY: Primary | ICD-10-CM

## 2018-11-20 PROCEDURE — 99213 OFFICE O/P EST LOW 20 MIN: CPT | Performed by: INTERNAL MEDICINE

## 2019-06-14 ENCOUNTER — TRANSCRIBE ORDERS (OUTPATIENT)
Dept: ADMINISTRATIVE | Facility: HOSPITAL | Age: 74
End: 2019-06-14

## 2019-06-14 DIAGNOSIS — Z12.31 VISIT FOR SCREENING MAMMOGRAM: Primary | ICD-10-CM

## 2019-08-26 ENCOUNTER — HOSPITAL ENCOUNTER (OUTPATIENT)
Dept: MAMMOGRAPHY | Facility: HOSPITAL | Age: 74
Discharge: HOME | End: 2019-08-26
Admitting: OBSTETRICS & GYNECOLOGY

## 2019-08-26 DIAGNOSIS — Z12.31 VISIT FOR SCREENING MAMMOGRAM: ICD-10-CM

## 2019-08-26 PROCEDURE — 77067 SCR MAMMO BI INCL CAD: CPT

## 2019-08-26 PROCEDURE — 77067 SCR MAMMO BI INCL CAD: CPT | Performed by: RADIOLOGY

## 2019-08-26 PROCEDURE — 77063 BREAST TOMOSYNTHESIS BI: CPT

## 2019-08-26 PROCEDURE — 77063 BREAST TOMOSYNTHESIS BI: CPT | Performed by: RADIOLOGY

## 2019-10-22 ENCOUNTER — OFFICE VISIT (OUTPATIENT)
Dept: OBSTETRICS AND GYNECOLOGY | Facility: CLINIC | Age: 74
End: 2019-10-22

## 2019-10-22 VITALS
BODY MASS INDEX: 20.25 KG/M2 | DIASTOLIC BLOOD PRESSURE: 62 MMHG | SYSTOLIC BLOOD PRESSURE: 118 MMHG | WEIGHT: 118.6 LBS | HEIGHT: 64 IN

## 2019-10-22 DIAGNOSIS — N95.2 VAGINAL ATROPHY: Primary | ICD-10-CM

## 2019-10-22 DIAGNOSIS — N60.11 FIBROCYSTIC BREAST CHANGES, BILATERAL: ICD-10-CM

## 2019-10-22 DIAGNOSIS — Z78.0 MENOPAUSE: ICD-10-CM

## 2019-10-22 DIAGNOSIS — N60.12 FIBROCYSTIC BREAST CHANGES, BILATERAL: ICD-10-CM

## 2019-10-22 DIAGNOSIS — M81.0 AGE-RELATED OSTEOPOROSIS WITHOUT CURRENT PATHOLOGICAL FRACTURE: ICD-10-CM

## 2019-10-22 PROCEDURE — 99213 OFFICE O/P EST LOW 20 MIN: CPT | Performed by: OBSTETRICS & GYNECOLOGY

## 2019-10-22 RX ORDER — RALOXIFENE HYDROCHLORIDE 60 MG/1
1 TABLET, FILM COATED ORAL DAILY
COMMUNITY
End: 2019-10-22 | Stop reason: SDUPTHER

## 2019-10-22 RX ORDER — RALOXIFENE HYDROCHLORIDE 60 MG/1
60 TABLET, FILM COATED ORAL DAILY
Qty: 90 TABLET | Refills: 3 | Status: SHIPPED | OUTPATIENT
Start: 2019-10-22 | End: 2020-10-12 | Stop reason: SDUPTHER

## 2019-10-22 RX ORDER — FINASTERIDE 5 MG/1
1 TABLET, FILM COATED ORAL DAILY
COMMUNITY
Start: 2019-09-08 | End: 2023-01-26

## 2019-10-22 RX ORDER — LISINOPRIL 30 MG/1
0.5 TABLET ORAL DAILY
COMMUNITY
Start: 2019-07-29 | End: 2020-11-03

## 2019-10-22 RX ORDER — CHLORHEXIDINE GLUCONATE 0.12 MG/ML
RINSE ORAL
COMMUNITY
Start: 2019-08-04 | End: 2019-12-23 | Stop reason: HOSPADM

## 2019-10-22 NOTE — PROGRESS NOTES
Chief Complaint   Patient presents with   • Gynecologic Exam   • Med Refill   • Breast Problem     c/o tenderness left breast       Holly Davis is a 73 y.o. year old  presenting to be seen because of up for menopause resulting in vaginal atrophy and dryness; and age-related osteoporosis (2 stress fractures).  This patient's DEXA is consistent with osteopenia.  She has had 2 stress fractures and has a clinical diagnosis of osteoporosis.  She is treated with raloxifene, 60 mg daily and has no side effects on this medication.  Her vaginal atrophy is treated with Premarin vaginal cream, 0.5 g twice a week.  She has a history of fibrocystic changes of the breast with recent breast imaging that was benign.  She has developed some tenderness in her left breast after eating chocolate.  She denies bowel or urinary symptoms.    Social History     Substance and Sexual Activity   Sexual Activity Yes   • Partners: Male   • Birth control/protection: Surgical     SCREENING TESTS    Year 2012   Age                         PAP                         HPV high risk                         Mammogram       benign benign                 SWATHI score                         Breast MRI                         Lipids                         Vitamin D                         Colonoscopy                         DEXA  Frax (hip/any)       osteopenia                  Ovarian Screen                             No Additional Complaints Reported    The following portions of the patient's history were reviewed and updated as appropriate:vital signs and   She  has a past medical history of Bursitis, Fibrocystic breast changes, bilateral (10/4/2016), Menopause, Monoclonal gammopathy, Osteoporosis, SI (sacroiliac) pain, Sinus tachycardia, Stress fracture of ankle, and Stress fracture of foot.  She does not have any pertinent problems on  file.  She  has a past surgical history that includes Hysterectomy (Bilateral); Total abdominal hysterectomy w/ bilateral salpingoophorectomy (Bilateral); Knee cartilage surgery; Cataract extraction w/ intraocular lens  implant, bilateral; Appendectomy; Back surgery; Colonoscopy; knee arthroplasty, partial replacement (Right); Replacement total knee (Left, 04/2018); knee arthroplasty, partial replacement (Left, 04/16/2018); Breast excisional biopsy; and Breast biopsy (Left).  She  reports that she has never smoked. She has never used smokeless tobacco. She reports that she drinks alcohol. She reports that she does not use drugs.  Current Outpatient Medications   Medication Sig Dispense Refill   • Calcium-Magnesium-Vitamin D - MG-MG-UNIT tablet sustained-release 24 hour Take 1 tablet by mouth Daily.     • chlorhexidine (PERIDEX) 0.12 % solution      • [START ON 10/24/2019] conjugated estrogens (PREMARIN) 0.625 MG/GM vaginal cream Insert 0.5 application into the vagina 2 (Two) Times a Week. 30 g 2   • finasteride (PROSCAR) 5 MG tablet Take 1 tablet by mouth Daily.     • fluvastatin XL (LESCOL XL) 80 MG 24 hr tablet Take 1 tablet by mouth Daily.  0   • lisinopril (PRINIVIL,ZESTRIL) 30 MG tablet Take 0.5 tablets by mouth Daily.     • metoprolol succinate XL (TOPROL-XL) 25 MG 24 hr tablet Take 1 tablet by mouth Daily.  0   • Phytonadione (MEPHYTON PO) vitamin K     • Probiotic Product (PROBIOTIC DAILY PO) Take 1 tablet by mouth Daily.     • raloxifene (EVISTA) 60 MG tablet Take 1 tablet by mouth Daily. 90 tablet 3   • RESTASIS 0.05 % ophthalmic emulsion Administer 1 drop to both eyes 2 (Two) Times a Day.  3   • senna-docusate (PERICOLACE) 8.6-50 MG per tablet Take 2 tablets by mouth Daily.     • temazepam (RESTORIL) 15 MG capsule Take 1 capsule by mouth As Needed.  1   • traZODone (DESYREL) 100 MG tablet Take 1 tablet by mouth Daily.  0   • Vitamin D, Cholecalciferol, 1000 UNITS capsule Take 1,000 Int'l Units  "by mouth Daily.       No current facility-administered medications for this visit.      She is allergic to cefdinir; midazolam; other; sulfa antibiotics; and trimethoprim..        Review of Systems  A comprehensive review of systems was not done.  Constitutional: negative for fever, chills, activity change, appetite change, fatigue and unexpected weight change.  Respiratory: not done  Cardiovascular: positive for palpitations  Gastrointestinal: negative  Genitourinary:negative  Musculoskeletal:negative  Behavioral/Psych: not done          /62   Ht 162.6 cm (64\")   Wt 53.8 kg (118 lb 9.6 oz)   LMP  (LMP Unknown)   Breastfeeding? No   BMI 20.36 kg/m²     Physical Exam    General:  alert; cooperative; well developed; well nourished   Skin:  Not performed.   Thyroid: not examined   Lungs:  clear to auscultation bilaterally   Heart:  regular rate and rhythm, S1, S2 normal, no murmur, click, rub or gallop   Breasts:  Examined in supine position  Symmetric without masses or skin dimpling  Nipples normal without inversion, lesions or discharge  There are no palpable axillary nodes  Fibrocystic changes are present both breasts without a discrete mass  tenderness in the OQ of the left breast with fibrocystic changes   Abdomen: soft, non-tender; no masses  no umbilical or inguinal hernias are present  no hepato-splenomegaly   Pelvis: Clinical staff was present for exam  External genitalia:  normal appearance of the external genitalia including Bartholin's and [place]'s glands.  Vaginal:  normal pink mucosa without prolapse or lesions.  Cervix:  absent.  Uterus:  absent.  Adnexa:  absent, bilateral.  Rectal:  anus visually normal appearing. recto-vaginal exam unremarkable and confirms findings;     Lab Review   No data reviewed    Imaging   Mammogram results- Birads I  DEXA results-moderate osteopenia of the spine, left hip, and left femoral neck    Medical counseling was given to patient for the following topics: " diagnostic results, instructions for management, risk factor reductions, impressions, risks and benefits of treatment options and importance of treatment compliance . Total time of the encounter was 19 minute(s) and 11 minute(s)  was spent in face to face counseling.  I have counseled the patient that she needs to discontinue chocolate as well as caffeine.  I have counseled her to use vitamin E, 400 IUs daily when her breast is tender.  I have counseled her that her breast exam is consistent with fibrocystic change.  I have instructed her in monthly self breast assessment.  I have advised the patient in fall prevention and encouraged her to continue taking raloxifene 60 mg daily.  I have counseled her to exercise and take calcium with vitamin D daily.          ASSESSMENT  Problems Addressed this Visit        Musculoskeletal and Integument    Osteoporosis    Relevant Medications    raloxifene (EVISTA) 60 MG tablet       Genitourinary    Menopause    Vaginal atrophy - Primary    Relevant Medications    conjugated estrogens (PREMARIN) 0.625 MG/GM vaginal cream (Start on 10/24/2019)       Other    Fibrocystic breast changes, bilateral           Substance History:    reports that she has never smoked. She has never used smokeless tobacco.    reports that she drinks alcohol.    reports that she does not use drugs.    Substance use counseling is not indicated based on patient history.  Her alcohol intake is social.      PLAN    Medications prescribed this encounter:    New Medications Ordered This Visit   Medications   • raloxifene (EVISTA) 60 MG tablet     Sig: Take 1 tablet by mouth Daily.     Dispense:  90 tablet     Refill:  3   • conjugated estrogens (PREMARIN) 0.625 MG/GM vaginal cream     Sig: Insert 0.5 application into the vagina 2 (Two) Times a Week.     Dispense:  30 g     Refill:  2   · Monthly self breast assessment and annual breast imaging.  Avoid caffeine and chocolate due to breast tenderness.  She may  take vitamin E, 400 IUs daily when her breasts are tender.  · Follow up: 12 month(s)  · Calcium, 600 mg/ Vit. D, 400 IU daily.  Sarah, weight-bearing exercise 4 days a week     *Please note that portions of this documentation may have been completed with a voice recognition program.  Efforts were made to edit this dictation, but occasional words may have been mistranscribed.     This note was electronically signed.    LEA Lentz MD  October 22, 2019  1:42 PM

## 2019-11-11 ENCOUNTER — TELEPHONE (OUTPATIENT)
Dept: ONCOLOGY | Facility: CLINIC | Age: 74
End: 2019-11-11

## 2019-11-11 NOTE — TELEPHONE ENCOUNTER
CONRAD RITCHIE - PT IS WANTING TO HAVE HER BLOODWORK DONE THIS WEDNESDAY 11/13/19 - AROUND 11 AM - SHE IS WANTING TO HAVE HER BLOODWORK DONE A WEEK BEFORE HER APPT, PLEASE PUT THE LAB ORDERS IN FOR HER TO HAVE THE LABS DONE ON Wednesday 11/13/19 - IF ANY QUESTIONS PLEASE CALL PT CHRIS LIANG BACK .[phone]

## 2019-11-13 ENCOUNTER — LAB (OUTPATIENT)
Dept: LAB | Facility: HOSPITAL | Age: 74
End: 2019-11-13

## 2019-11-13 DIAGNOSIS — D47.2 MONOCLONAL GAMMOPATHY: ICD-10-CM

## 2019-11-13 LAB
ALBUMIN SERPL-MCNC: 4.1 G/DL (ref 3.5–5.2)
ALBUMIN/GLOB SERPL: 1.5 G/DL
ALP SERPL-CCNC: 97 U/L (ref 39–117)
ALT SERPL W P-5'-P-CCNC: 13 U/L (ref 1–33)
ANION GAP SERPL CALCULATED.3IONS-SCNC: 11 MMOL/L (ref 5–15)
AST SERPL-CCNC: 16 U/L (ref 1–32)
BASOPHILS # BLD AUTO: 0.04 10*3/MM3 (ref 0–0.2)
BASOPHILS NFR BLD AUTO: 0.6 % (ref 0–1.5)
BILIRUB SERPL-MCNC: 0.5 MG/DL (ref 0.2–1.2)
BUN BLD-MCNC: 14 MG/DL (ref 8–23)
BUN/CREAT SERPL: 26.4 (ref 7–25)
CALCIUM SPEC-SCNC: 8.9 MG/DL (ref 8.6–10.5)
CHLORIDE SERPL-SCNC: 105 MMOL/L (ref 98–107)
CO2 SERPL-SCNC: 25 MMOL/L (ref 22–29)
CREAT BLD-MCNC: 0.53 MG/DL (ref 0.57–1)
DEPRECATED RDW RBC AUTO: 49.4 FL (ref 37–54)
EOSINOPHIL # BLD AUTO: 0.14 10*3/MM3 (ref 0–0.4)
EOSINOPHIL NFR BLD AUTO: 2.2 % (ref 0.3–6.2)
ERYTHROCYTE [DISTWIDTH] IN BLOOD BY AUTOMATED COUNT: 14.2 % (ref 12.3–15.4)
GFR SERPL CREATININE-BSD FRML MDRD: 112 ML/MIN/1.73
GLOBULIN UR ELPH-MCNC: 2.8 GM/DL
GLUCOSE BLD-MCNC: 89 MG/DL (ref 65–99)
HCT VFR BLD AUTO: 38.7 % (ref 34–46.6)
HGB BLD-MCNC: 11.9 G/DL (ref 12–15.9)
IMM GRANULOCYTES # BLD AUTO: 0.01 10*3/MM3 (ref 0–0.05)
IMM GRANULOCYTES NFR BLD AUTO: 0.2 % (ref 0–0.5)
LYMPHOCYTES # BLD AUTO: 1.86 10*3/MM3 (ref 0.7–3.1)
LYMPHOCYTES NFR BLD AUTO: 29.2 % (ref 19.6–45.3)
MCH RBC QN AUTO: 29.1 PG (ref 26.6–33)
MCHC RBC AUTO-ENTMCNC: 30.7 G/DL (ref 31.5–35.7)
MCV RBC AUTO: 94.6 FL (ref 79–97)
MONOCYTES # BLD AUTO: 0.48 10*3/MM3 (ref 0.1–0.9)
MONOCYTES NFR BLD AUTO: 7.5 % (ref 5–12)
NEUTROPHILS # BLD AUTO: 3.83 10*3/MM3 (ref 1.7–7)
NEUTROPHILS NFR BLD AUTO: 60.3 % (ref 42.7–76)
NRBC BLD AUTO-RTO: 0 /100 WBC (ref 0–0.2)
PLATELET # BLD AUTO: 134 10*3/MM3 (ref 140–450)
PMV BLD AUTO: 11.3 FL (ref 6–12)
POTASSIUM BLD-SCNC: 4.3 MMOL/L (ref 3.5–5.2)
PROT SERPL-MCNC: 6.9 G/DL (ref 6–8.5)
RBC # BLD AUTO: 4.09 10*6/MM3 (ref 3.77–5.28)
SODIUM BLD-SCNC: 141 MMOL/L (ref 136–145)
WBC NRBC COR # BLD: 6.36 10*3/MM3 (ref 3.4–10.8)

## 2019-11-13 PROCEDURE — 36415 COLL VENOUS BLD VENIPUNCTURE: CPT

## 2019-11-13 PROCEDURE — 83883 ASSAY NEPHELOMETRY NOT SPEC: CPT

## 2019-11-13 PROCEDURE — 86334 IMMUNOFIX E-PHORESIS SERUM: CPT

## 2019-11-13 PROCEDURE — 85025 COMPLETE CBC W/AUTO DIFF WBC: CPT

## 2019-11-13 PROCEDURE — 82784 ASSAY IGA/IGD/IGG/IGM EACH: CPT

## 2019-11-13 PROCEDURE — 84165 PROTEIN E-PHORESIS SERUM: CPT

## 2019-11-13 PROCEDURE — 80053 COMPREHEN METABOLIC PANEL: CPT

## 2019-11-15 LAB
ALBUMIN SERPL-MCNC: 3.4 G/DL (ref 2.9–4.4)
ALBUMIN/GLOB SERPL: 1.3 {RATIO} (ref 0.7–1.7)
ALPHA1 GLOB FLD ELPH-MCNC: 0.3 G/DL (ref 0–0.4)
ALPHA2 GLOB SERPL ELPH-MCNC: 0.7 G/DL (ref 0.4–1)
B-GLOBULIN SERPL ELPH-MCNC: 0.9 G/DL (ref 0.7–1.3)
GAMMA GLOB SERPL ELPH-MCNC: 0.9 G/DL (ref 0.4–1.8)
GLOBULIN SER CALC-MCNC: 2.7 G/DL (ref 2.2–3.9)
IGA SERPL-MCNC: 69 MG/DL (ref 64–422)
IGG SERPL-MCNC: 914 MG/DL (ref 700–1600)
IGM SERPL-MCNC: 98 MG/DL (ref 26–217)
INTERPRETATION SERPL IEP-IMP: ABNORMAL
KAPPA LC SERPL-MCNC: 11.2 MG/L (ref 3.3–19.4)
KAPPA LC/LAMBDA SER: 0.56 {RATIO} (ref 0.26–1.65)
LAMBDA LC FREE SERPL-MCNC: 19.9 MG/L (ref 5.7–26.3)
Lab: ABNORMAL
M-SPIKE: 0.5 G/DL
PROT SERPL-MCNC: 6.1 G/DL (ref 6–8.5)

## 2019-11-21 ENCOUNTER — OFFICE VISIT (OUTPATIENT)
Dept: ONCOLOGY | Facility: CLINIC | Age: 74
End: 2019-11-21

## 2019-11-21 VITALS
HEART RATE: 75 BPM | HEIGHT: 64 IN | RESPIRATION RATE: 16 BRPM | TEMPERATURE: 98.2 F | OXYGEN SATURATION: 100 % | WEIGHT: 116 LBS | BODY MASS INDEX: 19.81 KG/M2 | DIASTOLIC BLOOD PRESSURE: 57 MMHG | SYSTOLIC BLOOD PRESSURE: 121 MMHG

## 2019-11-21 DIAGNOSIS — D47.2 MONOCLONAL GAMMOPATHY: Primary | ICD-10-CM

## 2019-11-21 PROCEDURE — 99213 OFFICE O/P EST LOW 20 MIN: CPT | Performed by: INTERNAL MEDICINE

## 2019-11-21 NOTE — PROGRESS NOTES
"      PROBLEM LIST:  1. 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  2. osteoprosis  3. Tachycardia  4. hyperlipidemia    Subjective      Cc: mgus    HISTORY OF PRESENT ILLNESS:   Holly Davis returns for follow-up.   She reports she is doing well.  No new complaints or concerns.  She says she continues to be in good health.  No bone pain or other issues.    Past Medical History, Past Surgical History, Social History, Family History have been reviewed and are without significant changes except as mentioned.    Review of Systems   A comprehensive 14 point review of systems was performed and was negative except as mentioned.    Medications:  The current medication list was reviewed in the EMR    ALLERGIES:    Allergies   Allergen Reactions   • Cefdinir Other (See Comments)     \"OMNICEF (CEFDINIR) GAVE ME C-DIFF-SEVERAL YEARS AGO\"   • Midazolam Other (See Comments)     \"LAST TIME TOOK VERSED,COULD NOT BREATHE,WAS GIVEN REVERSAL DRUG\"   • Other      OMNI IV   • Sulfa Antibiotics Rash   • Trimethoprim Unknown (See Comments) and Rash       Objective      /57 Comment: LUE  Pulse 75   Temp 98.2 °F (36.8 °C) (Temporal)   Resp 16   Ht 162.6 cm (64\")   Wt 52.6 kg (116 lb)   LMP  (LMP Unknown)   SpO2 100% Comment: RA  BMI 19.91 kg/m²      Performance Status: 0    General: well appearing female in no acute distress  Neuro: alert and oriented  HEENT: sclera anicteric, oropharynx clear  Lymphatics: no cervical, supraclavicular, or axillary adenopathy  Cardiovascular: regular rate and rhythm, no murmurs  Lungs: clear to auscultation bilaterally  Abdomen: soft, nontender, nondistended.  No palpable organomegaly  Extremeties: no lower extremity edema  Skin: no rashes, lesions, bruising, or petechiae  Psych: mood and affect appropriate      RECENT LABS:  Results for HOLLY DAVIS (MRN [id number]) as of " 11/21/2019 10:30   Ref. Range 11/13/2019 12:53 11/13/2019 12:53   Glucose Latest Ref Range: 65 - 99 mg/dL 89    Sodium Latest Ref Range: 136 - 145 mmol/L 141    Potassium Latest Ref Range: 3.5 - 5.2 mmol/L 4.3    CO2 Latest Ref Range: 22.0 - 29.0 mmol/L 25.0    Chloride Latest Ref Range: 98 - 107 mmol/L 105    Anion Gap Latest Ref Range: 5.0 - 15.0 mmol/L 11.0    Creatinine Latest Ref Range: 0.57 - 1.00 mg/dL 0.53 (L)    BUN Latest Ref Range: 8 - 23 mg/dL 14    BUN/Creatinine Ratio Latest Ref Range: 7.0 - 25.0  26.4 (H)    Calcium Latest Ref Range: 8.6 - 10.5 mg/dL 8.9    eGFR Non  Am Latest Ref Range: >60 mL/min/1.73 112    Alkaline Phosphatase Latest Ref Range: 39 - 117 U/L 97    Total Protein Latest Ref Range: 6.0 - 8.5 g/dL 6.9 6.1   ALT (SGPT) Latest Ref Range: 1 - 33 U/L 13    AST (SGOT) Latest Ref Range: 1 - 32 U/L 16    Total Bilirubin Latest Ref Range: 0.2 - 1.2 mg/dL 0.5    Albumin Latest Ref Range: 2.9 - 4.4 g/dL 4.10 3.4   Globulin Latest Units: gm/dL 2.8    A/G Ratio Latest Ref Range: 0.7 - 1.7  1.5 1.3   Globulin Latest Ref Range: 2.2 - 3.9 g/dL  2.7   Alpha-1-Globulin Latest Ref Range: 0.0 - 0.4 g/dL  0.3   Alpha-2-Globulin Latest Ref Range: 0.4 - 1.0 g/dL  0.7   Beta Globulin Latest Ref Range: 0.7 - 1.3 g/dL  0.9   Gamma Globulin Latest Ref Range: 0.4 - 1.8 g/dL  0.9   M-Albino Latest Ref Range: Not Observed g/dL  0.5 (H)   Immunofixation Reflex, Serum Unknown  Comment   WBC Latest Ref Range: 3.40 - 10.80 10*3/mm3 6.36    RBC Latest Ref Range: 3.77 - 5.28 10*6/mm3 4.09    Hemoglobin Latest Ref Range: 12.0 - 15.9 g/dL 11.9 (L)    Hematocrit Latest Ref Range: 34.0 - 46.6 % 38.7    RDW Latest Ref Range: 12.3 - 15.4 % 14.2    MCV Latest Ref Range: 79.0 - 97.0 fL 94.6    MCH Latest Ref Range: 26.6 - 33.0 pg 29.1    MCHC Latest Ref Range: 31.5 - 35.7 g/dL 30.7 (L)    MPV Latest Ref Range: 6.0 - 12.0 fL 11.3    Platelets Latest Ref Range: 140 - 450 10*3/mm3 134 (L)    RDW-SD Latest Ref Range: 37.0 -  54.0 fl 49.4    Neutrophil Rel % Latest Ref Range: 42.7 - 76.0 % 60.3    Lymphocyte Rel % Latest Ref Range: 19.6 - 45.3 % 29.2    Monocyte Rel % Latest Ref Range: 5.0 - 12.0 % 7.5    Eosinophil Rel % Latest Ref Range: 0.3 - 6.2 % 2.2    Basophil Rel % Latest Ref Range: 0.0 - 1.5 % 0.6    Immature Granulocyte Rel % Latest Ref Range: 0.0 - 0.5 % 0.2    Neutrophils Absolute Latest Ref Range: 1.70 - 7.00 10*3/mm3 3.83    Lymphocytes Absolute Latest Ref Range: 0.70 - 3.10 10*3/mm3 1.86    Monocytes Absolute Latest Ref Range: 0.10 - 0.90 10*3/mm3 0.48    Eosinophils Absolute Latest Ref Range: 0.00 - 0.40 10*3/mm3 0.14    Basophils Absolute Latest Ref Range: 0.00 - 0.20 10*3/mm3 0.04    Immature Grans, Absolute Latest Ref Range: 0.00 - 0.05 10*3/mm3 0.01    nRBC Latest Ref Range: 0.0 - 0.2 /100 WBC 0.0    IgA Latest Ref Range: 64 - 422 mg/dL  69   IgG Latest Ref Range: 700 - 1600 mg/dL  914   IgM Latest Ref Range: 26 - 217 mg/dL  98   Kappa FLC Latest Ref Range: 3.3 - 19.4 mg/L 11.2    Free Lambda Light Chains Latest Ref Range: 5.7 - 26.3 mg/L 19.9    Kappa/Lambda Ratio Latest Ref Range: 0.26 - 1.65  0.56        Assessment/Plan   Holly Davis is a 73 y.o. 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  She has no evidence of progression.  We reviewed her labs over the past several years which have shown no change.  Today she has a very mild thrombocytopenia but this is not that far off from her usual.    F/u in 1 year with repeat labs.                I spent 15 minutes with the patient. I spent > 50% percent of this time counseling and discussing prognosis, diagnostic testing, evaluation, current status and management.    Nazia Monreal MD  [facility] Hematology and Oncology    11/21/2019          CC:

## 2019-12-01 ENCOUNTER — ANESTHESIA EVENT (OUTPATIENT)
Dept: GASTROENTEROLOGY | Facility: HOSPITAL | Age: 74
End: 2019-12-01

## 2019-12-01 ENCOUNTER — HOSPITAL ENCOUNTER (EMERGENCY)
Facility: HOSPITAL | Age: 74
Discharge: HOME | End: 2019-12-01
Attending: EMERGENCY MEDICINE | Admitting: INTERNAL MEDICINE

## 2019-12-01 ENCOUNTER — ANESTHESIA (OUTPATIENT)
Dept: GASTROENTEROLOGY | Facility: HOSPITAL | Age: 74
End: 2019-12-01

## 2019-12-01 ENCOUNTER — APPOINTMENT (OUTPATIENT)
Dept: GENERAL RADIOLOGY | Facility: HOSPITAL | Age: 74
End: 2019-12-01

## 2019-12-01 VITALS
DIASTOLIC BLOOD PRESSURE: 78 MMHG | HEART RATE: 78 BPM | OXYGEN SATURATION: 95 % | SYSTOLIC BLOOD PRESSURE: 137 MMHG | TEMPERATURE: 98.1 F | BODY MASS INDEX: 18.61 KG/M2 | HEIGHT: 64 IN | RESPIRATION RATE: 14 BRPM | WEIGHT: 109 LBS

## 2019-12-01 DIAGNOSIS — T18.108A FOREIGN BODY IN ESOPHAGUS, INITIAL ENCOUNTER: Primary | ICD-10-CM

## 2019-12-01 DIAGNOSIS — K22.2 ESOPHAGEAL OBSTRUCTION DUE TO FOOD IMPACTION: ICD-10-CM

## 2019-12-01 DIAGNOSIS — T18.128A ESOPHAGEAL OBSTRUCTION DUE TO FOOD IMPACTION: ICD-10-CM

## 2019-12-01 LAB
ALBUMIN SERPL-MCNC: 4.5 G/DL (ref 3.5–5.2)
ALBUMIN/GLOB SERPL: 1.5 G/DL
ALP SERPL-CCNC: 111 U/L (ref 39–117)
ALT SERPL W P-5'-P-CCNC: 17 U/L (ref 1–33)
ANION GAP SERPL CALCULATED.3IONS-SCNC: 17 MMOL/L (ref 5–15)
AST SERPL-CCNC: 21 U/L (ref 1–32)
BASOPHILS # BLD AUTO: 0.06 10*3/MM3 (ref 0–0.2)
BASOPHILS NFR BLD AUTO: 0.7 % (ref 0–1.5)
BILIRUB SERPL-MCNC: 0.9 MG/DL (ref 0.2–1.2)
BUN BLD-MCNC: 23 MG/DL (ref 8–23)
BUN/CREAT SERPL: 28.4 (ref 7–25)
CALCIUM SPEC-SCNC: 9.6 MG/DL (ref 8.6–10.5)
CHLORIDE SERPL-SCNC: 105 MMOL/L (ref 98–107)
CO2 SERPL-SCNC: 22 MMOL/L (ref 22–29)
CREAT BLD-MCNC: 0.81 MG/DL (ref 0.57–1)
DEPRECATED RDW RBC AUTO: 47.6 FL (ref 37–54)
EOSINOPHIL # BLD AUTO: 0.02 10*3/MM3 (ref 0–0.4)
EOSINOPHIL NFR BLD AUTO: 0.2 % (ref 0.3–6.2)
ERYTHROCYTE [DISTWIDTH] IN BLOOD BY AUTOMATED COUNT: 14.1 % (ref 12.3–15.4)
GFR SERPL CREATININE-BSD FRML MDRD: 69 ML/MIN/1.73
GLOBULIN UR ELPH-MCNC: 3.1 GM/DL
GLUCOSE BLD-MCNC: 134 MG/DL (ref 65–99)
HCT VFR BLD AUTO: 43.6 % (ref 34–46.6)
HGB BLD-MCNC: 13.9 G/DL (ref 12–15.9)
HOLD SPECIMEN: NORMAL
HOLD SPECIMEN: NORMAL
IMM GRANULOCYTES # BLD AUTO: 0.02 10*3/MM3 (ref 0–0.05)
IMM GRANULOCYTES NFR BLD AUTO: 0.2 % (ref 0–0.5)
LIPASE SERPL-CCNC: 24 U/L (ref 13–60)
LYMPHOCYTES # BLD AUTO: 1.77 10*3/MM3 (ref 0.7–3.1)
LYMPHOCYTES NFR BLD AUTO: 20.9 % (ref 19.6–45.3)
MCH RBC QN AUTO: 29.1 PG (ref 26.6–33)
MCHC RBC AUTO-ENTMCNC: 31.9 G/DL (ref 31.5–35.7)
MCV RBC AUTO: 91.2 FL (ref 79–97)
MONOCYTES # BLD AUTO: 0.65 10*3/MM3 (ref 0.1–0.9)
MONOCYTES NFR BLD AUTO: 7.7 % (ref 5–12)
NEUTROPHILS # BLD AUTO: 5.96 10*3/MM3 (ref 1.7–7)
NEUTROPHILS NFR BLD AUTO: 70.3 % (ref 42.7–76)
NRBC BLD AUTO-RTO: 0 /100 WBC (ref 0–0.2)
PLATELET # BLD AUTO: 167 10*3/MM3 (ref 140–450)
PMV BLD AUTO: 10.7 FL (ref 6–12)
POTASSIUM BLD-SCNC: 4.3 MMOL/L (ref 3.5–5.2)
PROT SERPL-MCNC: 7.6 G/DL (ref 6–8.5)
RBC # BLD AUTO: 4.78 10*6/MM3 (ref 3.77–5.28)
SODIUM BLD-SCNC: 144 MMOL/L (ref 136–145)
TROPONIN T SERPL-MCNC: <0.01 NG/ML (ref 0–0.03)
WBC NRBC COR # BLD: 8.48 10*3/MM3 (ref 3.4–10.8)
WHOLE BLOOD HOLD SPECIMEN: NORMAL
WHOLE BLOOD HOLD SPECIMEN: NORMAL

## 2019-12-01 PROCEDURE — 99283 EMERGENCY DEPT VISIT LOW MDM: CPT | Performed by: INTERNAL MEDICINE

## 2019-12-01 PROCEDURE — 71045 X-RAY EXAM CHEST 1 VIEW: CPT

## 2019-12-01 PROCEDURE — 25010000002 ONDANSETRON PER 1 MG: Performed by: NURSE ANESTHETIST, CERTIFIED REGISTERED

## 2019-12-01 PROCEDURE — 99283 EMERGENCY DEPT VISIT LOW MDM: CPT

## 2019-12-01 PROCEDURE — 83690 ASSAY OF LIPASE: CPT | Performed by: EMERGENCY MEDICINE

## 2019-12-01 PROCEDURE — 25010000002 DEXAMETHASONE PER 1 MG: Performed by: NURSE ANESTHETIST, CERTIFIED REGISTERED

## 2019-12-01 PROCEDURE — 80053 COMPREHEN METABOLIC PANEL: CPT | Performed by: EMERGENCY MEDICINE

## 2019-12-01 PROCEDURE — 84484 ASSAY OF TROPONIN QUANT: CPT | Performed by: EMERGENCY MEDICINE

## 2019-12-01 PROCEDURE — 25010000002 PROPOFOL 10 MG/ML EMULSION: Performed by: NURSE ANESTHETIST, CERTIFIED REGISTERED

## 2019-12-01 PROCEDURE — 25010000002 SUCCINYLCHOLINE PER 20 MG: Performed by: NURSE ANESTHETIST, CERTIFIED REGISTERED

## 2019-12-01 PROCEDURE — 85025 COMPLETE CBC W/AUTO DIFF WBC: CPT | Performed by: EMERGENCY MEDICINE

## 2019-12-01 RX ORDER — FENTANYL CITRATE 50 UG/ML
50 INJECTION, SOLUTION INTRAMUSCULAR; INTRAVENOUS
Status: DISCONTINUED | OUTPATIENT
Start: 2019-12-01 | End: 2019-12-01 | Stop reason: HOSPADM

## 2019-12-01 RX ORDER — PROPOFOL 10 MG/ML
VIAL (ML) INTRAVENOUS AS NEEDED
Status: DISCONTINUED | OUTPATIENT
Start: 2019-12-01 | End: 2019-12-01 | Stop reason: SURG

## 2019-12-01 RX ORDER — SUCCINYLCHOLINE CHLORIDE 20 MG/ML
INJECTION INTRAMUSCULAR; INTRAVENOUS AS NEEDED
Status: DISCONTINUED | OUTPATIENT
Start: 2019-12-01 | End: 2019-12-01 | Stop reason: SURG

## 2019-12-01 RX ORDER — ROCURONIUM BROMIDE 10 MG/ML
INJECTION, SOLUTION INTRAVENOUS AS NEEDED
Status: DISCONTINUED | OUTPATIENT
Start: 2019-12-01 | End: 2019-12-01 | Stop reason: SURG

## 2019-12-01 RX ORDER — ONDANSETRON 2 MG/ML
INJECTION INTRAMUSCULAR; INTRAVENOUS AS NEEDED
Status: DISCONTINUED | OUTPATIENT
Start: 2019-12-01 | End: 2019-12-01 | Stop reason: SURG

## 2019-12-01 RX ORDER — DEXAMETHASONE SODIUM PHOSPHATE 4 MG/ML
INJECTION, SOLUTION INTRA-ARTICULAR; INTRALESIONAL; INTRAMUSCULAR; INTRAVENOUS; SOFT TISSUE AS NEEDED
Status: DISCONTINUED | OUTPATIENT
Start: 2019-12-01 | End: 2019-12-01 | Stop reason: SURG

## 2019-12-01 RX ORDER — SODIUM CHLORIDE 0.9 % (FLUSH) 0.9 %
10 SYRINGE (ML) INJECTION AS NEEDED
Status: DISCONTINUED | OUTPATIENT
Start: 2019-12-01 | End: 2019-12-01 | Stop reason: HOSPADM

## 2019-12-01 RX ORDER — ONDANSETRON 2 MG/ML
4 INJECTION INTRAMUSCULAR; INTRAVENOUS ONCE AS NEEDED
Status: DISCONTINUED | OUTPATIENT
Start: 2019-12-01 | End: 2019-12-01 | Stop reason: HOSPADM

## 2019-12-01 RX ORDER — LIDOCAINE HYDROCHLORIDE 10 MG/ML
INJECTION, SOLUTION EPIDURAL; INFILTRATION; INTRACAUDAL; PERINEURAL AS NEEDED
Status: DISCONTINUED | OUTPATIENT
Start: 2019-12-01 | End: 2019-12-01 | Stop reason: SURG

## 2019-12-01 RX ORDER — ESMOLOL HYDROCHLORIDE 10 MG/ML
INJECTION INTRAVENOUS AS NEEDED
Status: DISCONTINUED | OUTPATIENT
Start: 2019-12-01 | End: 2019-12-01 | Stop reason: SURG

## 2019-12-01 RX ORDER — SODIUM CHLORIDE, SODIUM LACTATE, POTASSIUM CHLORIDE, CALCIUM CHLORIDE 600; 310; 30; 20 MG/100ML; MG/100ML; MG/100ML; MG/100ML
INJECTION, SOLUTION INTRAVENOUS CONTINUOUS PRN
Status: DISCONTINUED | OUTPATIENT
Start: 2019-12-01 | End: 2019-12-01 | Stop reason: SURG

## 2019-12-01 RX ORDER — OMEPRAZOLE 20 MG/1
20 CAPSULE, DELAYED RELEASE ORAL DAILY
Qty: 30 CAPSULE | Refills: 11 | Status: SHIPPED | OUTPATIENT
Start: 2019-12-01 | End: 2021-04-21

## 2019-12-01 RX ADMIN — ROCURONIUM BROMIDE 5 MG: 10 SOLUTION INTRAVENOUS at 11:54

## 2019-12-01 RX ADMIN — ESMOLOL HYDROCHLORIDE 20 MG: 10 INJECTION, SOLUTION INTRAVENOUS at 12:10

## 2019-12-01 RX ADMIN — PROPOFOL 130 MG: 10 INJECTION, EMULSION INTRAVENOUS at 11:54

## 2019-12-01 RX ADMIN — SODIUM CHLORIDE 1000 ML: 9 INJECTION, SOLUTION INTRAVENOUS at 10:31

## 2019-12-01 RX ADMIN — DEXAMETHASONE SODIUM PHOSPHATE 8 MG: 4 INJECTION, SOLUTION INTRAMUSCULAR; INTRAVENOUS at 11:57

## 2019-12-01 RX ADMIN — ONDANSETRON 4 MG: 2 INJECTION INTRAMUSCULAR; INTRAVENOUS at 11:57

## 2019-12-01 RX ADMIN — SUCCINYLCHOLINE CHLORIDE 100 MG: 20 INJECTION, SOLUTION INTRAMUSCULAR; INTRAVENOUS at 11:54

## 2019-12-01 RX ADMIN — LIDOCAINE HYDROCHLORIDE 50 MG: 10 INJECTION, SOLUTION EPIDURAL; INFILTRATION; INTRACAUDAL; PERINEURAL at 11:54

## 2019-12-01 RX ADMIN — SODIUM CHLORIDE, POTASSIUM CHLORIDE, SODIUM LACTATE AND CALCIUM CHLORIDE: 600; 310; 30; 20 INJECTION, SOLUTION INTRAVENOUS at 11:51

## 2019-12-01 NOTE — ED PROVIDER NOTES
Subjective   Ms. Davis is a 73-year-old female that states that she was eating steak last night on the last bite got a piece stuck in her esophagus.  She been able to keep anything down since that time.  Patient reports she had multiple episodes of vomiting.  Patient reports that she is had this happen in the past but never to the point she had to have an endoscopy.  Patient reports that one point he was stuck for over 3 hours while she was on a cruise but it did eventually resolve.  Patient reports she had no abdominal pain except for some epigastric cramping.  She had no blood in her stools.  That she does not take any anticoagulants.  She has no cardiac history.        History provided by:  Patient   used: No    Vomiting   The primary symptoms include abdominal pain and vomiting. Primary symptoms do not include fatigue or dysuria. The illness began yesterday. The onset was sudden.   The illness does not include chills, anorexia, bloating, constipation, back pain or itching. Associated symptoms comments: That a piece of steak stuck while eating last night.. Associated medical issues do not include inflammatory bowel disease, gallstones, liver disease, alcohol abuse, irritable bowel syndrome, hemorrhoids or diverticulitis.       Review of Systems   Constitutional: Negative for chills and fatigue.   Respiratory: Negative for chest tightness, shortness of breath and wheezing.    Cardiovascular: Negative for chest pain and palpitations.   Gastrointestinal: Positive for abdominal pain and vomiting. Negative for anorexia, bloating and constipation.   Genitourinary: Negative for dysuria, hematuria and pelvic pain.   Musculoskeletal: Negative for back pain.   Skin: Negative for itching.   Hematological: Negative for adenopathy.   Psychiatric/Behavioral: Negative.    All other systems reviewed and are negative.      Past Medical History:   Diagnosis Date   • Bursitis     left shoulder   • Fibrocystic  "breast changes, bilateral 10/4/2016   • Menopause    • Monoclonal gammopathy    • Osteoporosis    • SI (sacroiliac) pain    • Sinus tachycardia    • Stress fracture of ankle     RIGHT ANKLE   • Stress fracture of foot     LEFT FOOT       Allergies   Allergen Reactions   • Cefdinir Other (See Comments)     \"OMNICEF (CEFDINIR) GAVE ME C-DIFF-SEVERAL YEARS AGO\"   • Midazolam Other (See Comments)     \"LAST TIME TOOK VERSED,COULD NOT BREATHE,WAS GIVEN REVERSAL DRUG\"   • Other      OMNI IV   • Sulfa Antibiotics Rash   • Trimethoprim Unknown (See Comments) and Rash       Past Surgical History:   Procedure Laterality Date   • APPENDECTOMY     • BACK SURGERY      minimally invasive lumbar decompression   • BREAST BIOPSY Left    • BREAST EXCISIONAL BIOPSY      1980   • CATARACT EXTRACTION W/ INTRAOCULAR LENS  IMPLANT, BILATERAL     • COLONOSCOPY     • HYSTERECTOMY Bilateral    • KNEE ARTHROPLASTY, PARTIAL REPLACEMENT Right 06/02/2017   • KNEE ARTHROPLASTY, PARTIAL REPLACEMENT Left 04/16/2018   • KNEE CARTILAGE SURGERY     • REPLACEMENT TOTAL KNEE Left 04/2018   • TOTAL ABDOMINAL HYSTERECTOMY WITH SALPINGO OOPHORECTOMY Bilateral        Family History   Problem Relation Age of Onset   • Breast cancer Cousin 60   • Breast cancer Maternal Aunt 64   • Breast cancer Cousin    • Ovarian cancer Neg Hx        Social History     Socioeconomic History   • Marital status:      Spouse name: Not on file   • Number of children: Not on file   • Years of education: Not on file   • Highest education level: Not on file   Tobacco Use   • Smoking status: Never Smoker   • Smokeless tobacco: Never Used   Substance and Sexual Activity   • Alcohol use: Yes     Comment: social   • Drug use: No   • Sexual activity: Yes     Partners: Male     Birth control/protection: Surgical           Objective   Physical Exam   Constitutional: She is oriented to person, place, and time. She appears well-developed and well-nourished. No distress.   HENT: "   Head: Normocephalic and atraumatic.   Nose: Nose normal.   Eyes: Conjunctivae are normal. No scleral icterus.   Neck: Normal range of motion. Neck supple.   Cardiovascular: Normal rate, regular rhythm, normal heart sounds and intact distal pulses.   No murmur heard.  Pulmonary/Chest: Effort normal and breath sounds normal. No respiratory distress.   Abdominal: Soft. Bowel sounds are normal. There is no tenderness.   Musculoskeletal: Normal range of motion.   Neurological: She is alert and oriented to person, place, and time.   Skin: Skin is warm and dry. She is not diaphoretic.   Psychiatric: She has a normal mood and affect. Her behavior is normal.   Nursing note and vitals reviewed.      Procedures           ED Course  ED Course as of Dec 01 1013   Sun Dec 01, 2019   1011 I did give the patient a drink of water she is unable to keep this down and vomited back up probably within 30 seconds.  I spoke to Dr. Salazar the gastrologist on call he states that they will take the patient up for endoscopy once they have a spot open.  I informed the patient and her significant other of this.  [CYNTHIA]      ED Course User Index  [CYNTHIA] Maxime Betancur PA                  [facility]      Final diagnoses:   Esophageal obstruction due to food impaction              Maxime Betancur PA  12/02/19 0763

## 2019-12-01 NOTE — H&P (VIEW-ONLY)
[facility] [place] [facility]    Referring Provider: Marty Betancur PA-C    PCP: Patrick Marquis MD    Reason for Consultation: Esophageal foreign body    Chief complaint: Unable to swallow    History of present illness:    Holly Davis is a 73 y.o. female who presents to ER, unable to swallow after eating dinner last night. She is unable to handle her secretions. No prior EGD. Has history of intermittent dysphagia to solids for over a year. No weight loss.    Allergies:  Cefdinir; Midazolam; Other; Sulfa antibiotics; and Trimethoprim       Home Meds:    (Not in a hospital admission)    ROS: Review of Systems   Constitutional: Negative.  Negative for activity change, appetite change, chills, fatigue, fever and unexpected weight change.   HENT: Positive for trouble swallowing. Negative for mouth sores and nosebleeds.    Eyes: Negative.    Respiratory: Negative.  Negative for cough, choking, chest tightness, shortness of breath, wheezing and stridor.    Cardiovascular: Negative.  Negative for chest pain, palpitations and leg swelling.   Gastrointestinal: Positive for nausea and vomiting. Negative for abdominal distention, blood in stool, constipation and diarrhea.   Endocrine: Negative.    Genitourinary: Negative.  Negative for difficulty urinating and dysuria.   Musculoskeletal: Negative.  Negative for arthralgias and back pain.   Skin: Negative.  Negative for color change, pallor and rash.   Allergic/Immunologic: Negative.    Neurological: Negative.  Negative for tremors, seizures, syncope, weakness, light-headedness and headaches.   Hematological: Negative.  Negative for adenopathy. Does not bruise/bleed easily.   Psychiatric/Behavioral: Negative.  Negative for agitation and behavioral problems.       PAST MED HX:  Past Medical History:   Diagnosis Date   • Bursitis     left shoulder   • Fibrocystic breast changes, bilateral 10/4/2016   • Menopause    • Monoclonal gammopathy    • Osteoporosis    • SI  "(sacroiliac) pain    • Sinus tachycardia    • Stress fracture of ankle     RIGHT ANKLE   • Stress fracture of foot     LEFT FOOT       PAST SURG HX:  Past Surgical History:   Procedure Laterality Date   • APPENDECTOMY     • BACK SURGERY      minimally invasive lumbar decompression   • BREAST BIOPSY Left    • BREAST EXCISIONAL BIOPSY      1980   • CATARACT EXTRACTION W/ INTRAOCULAR LENS  IMPLANT, BILATERAL     • COLONOSCOPY     • HYSTERECTOMY Bilateral    • KNEE ARTHROPLASTY, PARTIAL REPLACEMENT Right 06/02/2017   • KNEE ARTHROPLASTY, PARTIAL REPLACEMENT Left 04/16/2018   • KNEE CARTILAGE SURGERY     • REPLACEMENT TOTAL KNEE Left 04/2018   • TOTAL ABDOMINAL HYSTERECTOMY WITH SALPINGO OOPHORECTOMY Bilateral        FAM HX:  Family History   Problem Relation Age of Onset   • Breast cancer Cousin 60   • Breast cancer Maternal Aunt 64   • Breast cancer Cousin    • Ovarian cancer Neg Hx        SOC HX:  Social History     Socioeconomic History   • Marital status:      Spouse name: Not on file   • Number of children: Not on file   • Years of education: Not on file   • Highest education level: Not on file   Tobacco Use   • Smoking status: Never Smoker   • Smokeless tobacco: Never Used   Substance and Sexual Activity   • Alcohol use: Yes     Comment: social   • Drug use: No   • Sexual activity: Yes     Partners: Male     Birth control/protection: Surgical       PHYSICAL EXAM  /74   Pulse 107   Temp 98.7 °F (37.1 °C) (Oral)   Resp 18   Ht 162.6 cm (64\")   Wt 49.4 kg (109 lb)   LMP  (LMP Unknown)   SpO2 99%   BMI 18.71 kg/m²   Wt Readings from Last 3 Encounters:   12/01/19 49.4 kg (109 lb)   11/21/19 52.6 kg (116 lb)   10/22/19 53.8 kg (118 lb 9.6 oz)   ,body mass index is 18.71 kg/m².  Physical Exam   Constitutional: She is oriented to person, place, and time. She appears well-developed and well-nourished. No distress.   HENT:   Head: Normocephalic.   Mouth/Throat: No oropharyngeal exudate.   Eyes: " Conjunctivae are normal. No scleral icterus.   Neck: Normal range of motion.   Cardiovascular: Regular rhythm.   Mild tachycardia   Pulmonary/Chest: Effort normal and breath sounds normal. No stridor. No respiratory distress. She has no wheezes. She has no rales.   Abdominal: Soft. Bowel sounds are normal. She exhibits no distension and no mass. There is no tenderness. There is no guarding.   Genitourinary:   Genitourinary Comments: deferred   Musculoskeletal: Normal range of motion. She exhibits no edema.   Neurological: She is alert and oriented to person, place, and time.   Skin: Skin is warm and dry. Capillary refill takes less than 2 seconds. No rash noted.   Psychiatric: She has a normal mood and affect. Her behavior is normal.   Nursing note and vitals reviewed.    Results Review:   I reviewed the patient's new clinical results.    Lab Results   Component Value Date    WBC 8.48 12/01/2019    HGB 13.9 12/01/2019    HGB 11.9 (L) 11/13/2019    HGB 12.2 11/08/2018    HCT 43.6 12/01/2019    MCV 91.2 12/01/2019     12/01/2019       No results found for: INR    Lab Results   Component Value Date    GLUCOSE 134 (H) 12/01/2019    BUN 23 12/01/2019    CREATININE 0.81 12/01/2019    EGFRIFNONA 69 12/01/2019    BCR 28.4 (H) 12/01/2019     12/01/2019    K 4.3 12/01/2019    CO2 22.0 12/01/2019    CALCIUM 9.6 12/01/2019    PROTENTOTREF 6.1 11/13/2019    ALBUMIN 4.50 12/01/2019    ALKPHOS 111 12/01/2019    BILITOT 0.9 12/01/2019    ALT 17 12/01/2019    AST 21 12/01/2019       ASSESSMENTS/PLANS    Esophageal food impaction.  Longstanding dysphagia to solids.  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 Monreal.    >> EGD with foreign body removal    I discussed the patients findings and my recommendations with patient, family and consulting provider    Mark I. Brunner, MD  12/01/19  10:03 AM

## 2019-12-01 NOTE — ANESTHESIA POSTPROCEDURE EVALUATION
Patient: Holly Ott Susan    Procedure Summary     Date:  12/01/19 Room / Location:   [facility]    Anesthesia Start:  1151 Anesthesia Stop:      Procedure:  ESOPHAGOGASTRODUODENOSCOPY WITH FOREIGN BODY REMOVAL (N/A ) Diagnosis:  (possible foriegn body)    Surgeon:  Brunner, Mark I, MD Provider:  Patrick Chawla MD    Anesthesia Type:  general ASA Status:  2 - Emergent          Anesthesia Type: general  Last vitals  BP   137/78 (12/01/19 1210)   Temp   98.1 °F (36.7 °C) (12/01/19 1210)   Pulse   78 (12/01/19 1210)   Resp   14 (12/01/19 1210)     SpO2   95 % (12/01/19 1210)     Post Anesthesia Care and Evaluation    Patient location during evaluation: PACU  Patient participation: complete - patient participated  Level of consciousness: awake  Pain score: 0  Pain management: adequate  Airway patency: patent  Anesthetic complications: No anesthetic complications  PONV Status: none  Cardiovascular status: acceptable and stable  Respiratory status: nasal cannula, unassisted, acceptable and spontaneous ventilation  Hydration status: acceptable

## 2019-12-01 NOTE — ANESTHESIA PROCEDURE NOTES
Airway  Urgency: elective    Date/Time: 12/1/2019 11:55 AM  Airway not difficult    General Information and Staff    Patient location during procedure: OR  CRNA: Yohan Randall CRNA    Indications and Patient Condition  Indications for airway management: airway protection    Preoxygenated: yes  MILS not maintained throughout  Mask difficulty assessment: 0 - not attempted    Final Airway Details  Final airway type: endotracheal airway      Successful airway: ETT  Cuffed: yes   Successful intubation technique: direct laryngoscopy and RSI  Facilitating devices/methods: cricoid pressure  Endotracheal tube insertion site: oral  Blade: Ott  Blade size: 2  ETT size (mm): 7.0  Cormack-Lehane Classification: grade I - full view of glottis  Placement verified by: chest auscultation and capnometry   Cuff volume (mL): 5  Measured from: lips  ETT/EBT  to lips (cm): 20  Number of attempts at approach: 1  Assessment: lips, teeth, and gum same as pre-op and atraumatic intubation    Additional Comments  Negative epigastric sounds, Breath sound equal bilaterally with symmetric chest rise and fall. RSi with cricoid pressure until tube confirmation. Dentition and mucosa unchnaged

## 2019-12-01 NOTE — H&P
[facility] [place] [facility]    Referring Provider: Marty Betancur PA-C    PCP: Patrick Marquis MD    Reason for Consultation: Esophageal foreign body    Chief complaint: Unable to swallow    History of present illness:    Holly Davis is a 73 y.o. female who presents to ER, unable to swallow after eating dinner last night. She is unable to handle her secretions. No prior EGD. Has history of intermittent dysphagia to solids for over a year. No weight loss.    Allergies:  Cefdinir; Midazolam; Other; Sulfa antibiotics; and Trimethoprim       Home Meds:    (Not in a hospital admission)    ROS: Review of Systems   Constitutional: Negative.  Negative for activity change, appetite change, chills, fatigue, fever and unexpected weight change.   HENT: Positive for trouble swallowing. Negative for mouth sores and nosebleeds.    Eyes: Negative.    Respiratory: Negative.  Negative for cough, choking, chest tightness, shortness of breath, wheezing and stridor.    Cardiovascular: Negative.  Negative for chest pain, palpitations and leg swelling.   Gastrointestinal: Positive for nausea and vomiting. Negative for abdominal distention, blood in stool, constipation and diarrhea.   Endocrine: Negative.    Genitourinary: Negative.  Negative for difficulty urinating and dysuria.   Musculoskeletal: Negative.  Negative for arthralgias and back pain.   Skin: Negative.  Negative for color change, pallor and rash.   Allergic/Immunologic: Negative.    Neurological: Negative.  Negative for tremors, seizures, syncope, weakness, light-headedness and headaches.   Hematological: Negative.  Negative for adenopathy. Does not bruise/bleed easily.   Psychiatric/Behavioral: Negative.  Negative for agitation and behavioral problems.       PAST MED HX:  Past Medical History:   Diagnosis Date   • Bursitis     left shoulder   • Fibrocystic breast changes, bilateral 10/4/2016   • Menopause    • Monoclonal gammopathy    • Osteoporosis    • SI  "(sacroiliac) pain    • Sinus tachycardia    • Stress fracture of ankle     RIGHT ANKLE   • Stress fracture of foot     LEFT FOOT       PAST SURG HX:  Past Surgical History:   Procedure Laterality Date   • APPENDECTOMY     • BACK SURGERY      minimally invasive lumbar decompression   • BREAST BIOPSY Left    • BREAST EXCISIONAL BIOPSY      1980   • CATARACT EXTRACTION W/ INTRAOCULAR LENS  IMPLANT, BILATERAL     • COLONOSCOPY     • HYSTERECTOMY Bilateral    • KNEE ARTHROPLASTY, PARTIAL REPLACEMENT Right 06/02/2017   • KNEE ARTHROPLASTY, PARTIAL REPLACEMENT Left 04/16/2018   • KNEE CARTILAGE SURGERY     • REPLACEMENT TOTAL KNEE Left 04/2018   • TOTAL ABDOMINAL HYSTERECTOMY WITH SALPINGO OOPHORECTOMY Bilateral        FAM HX:  Family History   Problem Relation Age of Onset   • Breast cancer Cousin 60   • Breast cancer Maternal Aunt 64   • Breast cancer Cousin    • Ovarian cancer Neg Hx        SOC HX:  Social History     Socioeconomic History   • Marital status:      Spouse name: Not on file   • Number of children: Not on file   • Years of education: Not on file   • Highest education level: Not on file   Tobacco Use   • Smoking status: Never Smoker   • Smokeless tobacco: Never Used   Substance and Sexual Activity   • Alcohol use: Yes     Comment: social   • Drug use: No   • Sexual activity: Yes     Partners: Male     Birth control/protection: Surgical       PHYSICAL EXAM  /74   Pulse 107   Temp 98.7 °F (37.1 °C) (Oral)   Resp 18   Ht 162.6 cm (64\")   Wt 49.4 kg (109 lb)   LMP  (LMP Unknown)   SpO2 99%   BMI 18.71 kg/m²   Wt Readings from Last 3 Encounters:   12/01/19 49.4 kg (109 lb)   11/21/19 52.6 kg (116 lb)   10/22/19 53.8 kg (118 lb 9.6 oz)   ,body mass index is 18.71 kg/m².  Physical Exam   Constitutional: She is oriented to person, place, and time. She appears well-developed and well-nourished. No distress.   HENT:   Head: Normocephalic.   Mouth/Throat: No oropharyngeal exudate.   Eyes: " Conjunctivae are normal. No scleral icterus.   Neck: Normal range of motion.   Cardiovascular: Regular rhythm.   Mild tachycardia   Pulmonary/Chest: Effort normal and breath sounds normal. No stridor. No respiratory distress. She has no wheezes. She has no rales.   Abdominal: Soft. Bowel sounds are normal. She exhibits no distension and no mass. There is no tenderness. There is no guarding.   Genitourinary:   Genitourinary Comments: deferred   Musculoskeletal: Normal range of motion. She exhibits no edema.   Neurological: She is alert and oriented to person, place, and time.   Skin: Skin is warm and dry. Capillary refill takes less than 2 seconds. No rash noted.   Psychiatric: She has a normal mood and affect. Her behavior is normal.   Nursing note and vitals reviewed.    Results Review:   I reviewed the patient's new clinical results.    Lab Results   Component Value Date    WBC 8.48 12/01/2019    HGB 13.9 12/01/2019    HGB 11.9 (L) 11/13/2019    HGB 12.2 11/08/2018    HCT 43.6 12/01/2019    MCV 91.2 12/01/2019     12/01/2019       No results found for: INR    Lab Results   Component Value Date    GLUCOSE 134 (H) 12/01/2019    BUN 23 12/01/2019    CREATININE 0.81 12/01/2019    EGFRIFNONA 69 12/01/2019    BCR 28.4 (H) 12/01/2019     12/01/2019    K 4.3 12/01/2019    CO2 22.0 12/01/2019    CALCIUM 9.6 12/01/2019    PROTENTOTREF 6.1 11/13/2019    ALBUMIN 4.50 12/01/2019    ALKPHOS 111 12/01/2019    BILITOT 0.9 12/01/2019    ALT 17 12/01/2019    AST 21 12/01/2019       ASSESSMENTS/PLANS    Esophageal food impaction.  Longstanding dysphagia to solids.  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 Monreal.    >> EGD with foreign body removal    I discussed the patients findings and my recommendations with patient, family and consulting provider    Mark I. Brunner, MD  12/01/19  10:03 AM

## 2019-12-01 NOTE — ANESTHESIA POSTPROCEDURE EVALUATION
Patient: Holly Ott Susan    Procedure Summary     Date:  12/01/19 Room / Location:   [facility]    Anesthesia Start:  1151 Anesthesia Stop:  1216    Procedure:  ESOPHAGOGASTRODUODENOSCOPY WITH FOREIGN BODY REMOVAL (N/A ) Diagnosis:  (possible foriegn body)    Surgeon:  Brunner, Mark I, MD Provider:  Patrick Chawla MD    Anesthesia Type:  general ASA Status:  2 - Emergent          Anesthesia Type: general  Last vitals  BP   137/78 (12/01/19 1210)   Temp   98.1 °F (36.7 °C) (12/01/19 1210)   Pulse   78 (12/01/19 1210)   Resp   14 (12/01/19 1210)     SpO2   95 % (12/01/19 1210)     Anesthesia Post Evaluation

## 2019-12-01 NOTE — BRIEF OP NOTE
ESOPHAGOGASTRODUODENOSCOPY WITH FOREIGN BODY REMOVAL  Progress Note    Holly Davis  12/1/2019    EGD shows food impaction in lower esophagus, removed with Woods net. A peptic stricture is noted.    >> Daily PPI  >> EGD with esophageal dilation in a few weeks.    Mark I. Brunner, MD     Date: 12/1/2019  Time: 12:09 PM

## 2019-12-01 NOTE — ANESTHESIA PREPROCEDURE EVALUATION
Anesthesia Evaluation     Patient summary reviewed and Nursing notes reviewed   no history of anesthetic complications:  NPO Solid Status: Waived due to emergency  NPO Liquid Status: Waived due to emergency           Airway   Mallampati: II  TM distance: >3 FB  Neck ROM: full  No difficulty expected  Dental - normal exam     Pulmonary - negative pulmonary ROS and normal exam    breath sounds clear to auscultation  Cardiovascular - normal exam    ECG reviewed  Rhythm: regular  Rate: normal    (+) hypertension, dysrhythmias Tachycardia,       Neuro/Psych- negative ROS  GI/Hepatic/Renal/Endo      ROS Comment: Food impaction    Musculoskeletal (-) negative ROS    Abdominal    Substance History - negative use     OB/GYN          Other - negative ROS                       Anesthesia Plan    ASA 2 - emergent     general   Rapid sequence  intravenous induction     Anesthetic plan, all risks, benefits, and alternatives have been provided, discussed and informed consent has been obtained with: patient.    Plan discussed with CRNA.

## 2019-12-04 ENCOUNTER — PREP FOR SURGERY (OUTPATIENT)
Dept: OTHER | Facility: HOSPITAL | Age: 74
End: 2019-12-04

## 2019-12-04 DIAGNOSIS — R13.19 ESOPHAGEAL DYSPHAGIA: Primary | ICD-10-CM

## 2019-12-05 PROBLEM — R13.19 ESOPHAGEAL DYSPHAGIA: Status: ACTIVE | Noted: 2019-12-05

## 2019-12-20 ENCOUNTER — ANESTHESIA EVENT (OUTPATIENT)
Dept: GASTROENTEROLOGY | Facility: HOSPITAL | Age: 74
End: 2019-12-20

## 2019-12-23 ENCOUNTER — PREP FOR SURGERY (OUTPATIENT)
Dept: OTHER | Facility: HOSPITAL | Age: 74
End: 2019-12-23

## 2019-12-23 ENCOUNTER — ANESTHESIA (OUTPATIENT)
Dept: GASTROENTEROLOGY | Facility: HOSPITAL | Age: 74
End: 2019-12-23

## 2019-12-23 ENCOUNTER — HOSPITAL ENCOUNTER (OUTPATIENT)
Facility: HOSPITAL | Age: 74
Setting detail: HOSPITAL OUTPATIENT SURGERY
Discharge: HOME | End: 2019-12-23
Attending: INTERNAL MEDICINE | Admitting: INTERNAL MEDICINE

## 2019-12-23 VITALS
OXYGEN SATURATION: 100 % | WEIGHT: 114 LBS | HEART RATE: 66 BPM | RESPIRATION RATE: 17 BRPM | BODY MASS INDEX: 19.57 KG/M2 | DIASTOLIC BLOOD PRESSURE: 42 MMHG | TEMPERATURE: 98.3 F | SYSTOLIC BLOOD PRESSURE: 102 MMHG

## 2019-12-23 DIAGNOSIS — R13.19 ESOPHAGEAL DYSPHAGIA: Primary | ICD-10-CM

## 2019-12-23 DIAGNOSIS — R13.19 ESOPHAGEAL DYSPHAGIA: ICD-10-CM

## 2019-12-23 PROBLEM — R53.83 OTHER MALAISE AND FATIGUE: Status: ACTIVE | Noted: 2017-08-17

## 2019-12-23 PROBLEM — D51.0 PERNICIOUS ANEMIA: Status: ACTIVE | Noted: 2019-12-23

## 2019-12-23 PROBLEM — F43.89 OTHER SPECIFIED ADJUSTMENT REACTION: Status: ACTIVE | Noted: 2019-12-23

## 2019-12-23 PROBLEM — R53.81 OTHER MALAISE AND FATIGUE: Status: ACTIVE | Noted: 2017-08-17

## 2019-12-23 PROBLEM — K64.9 HEMORRHOIDS: Status: ACTIVE | Noted: 2019-12-23

## 2019-12-23 PROBLEM — E56.9 VITAMIN DEFICIENCY: Status: ACTIVE | Noted: 2019-12-23

## 2019-12-23 PROBLEM — L65.9 ALOPECIA: Status: ACTIVE | Noted: 2019-12-23

## 2019-12-23 PROCEDURE — C1769 GUIDE WIRE: HCPCS | Performed by: INTERNAL MEDICINE

## 2019-12-23 PROCEDURE — 25010000003 LIDOCAINE 1 % SOLUTION: Performed by: NURSE ANESTHETIST, CERTIFIED REGISTERED

## 2019-12-23 PROCEDURE — 25010000002 PROPOFOL 10 MG/ML EMULSION: Performed by: NURSE ANESTHETIST, CERTIFIED REGISTERED

## 2019-12-23 PROCEDURE — 88305 TISSUE EXAM BY PATHOLOGIST: CPT | Performed by: INTERNAL MEDICINE

## 2019-12-23 RX ORDER — DEXAMETHASONE SODIUM PHOSPHATE 4 MG/ML
8 INJECTION, SOLUTION INTRA-ARTICULAR; INTRALESIONAL; INTRAMUSCULAR; INTRAVENOUS; SOFT TISSUE ONCE AS NEEDED
Status: DISCONTINUED | OUTPATIENT
Start: 2019-12-23 | End: 2019-12-23 | Stop reason: HOSPADM

## 2019-12-23 RX ORDER — LIDOCAINE HYDROCHLORIDE 10 MG/ML
0.5 INJECTION, SOLUTION EPIDURAL; INFILTRATION; INTRACAUDAL; PERINEURAL ONCE AS NEEDED
Status: DISCONTINUED | OUTPATIENT
Start: 2019-12-23 | End: 2019-12-23 | Stop reason: HOSPADM

## 2019-12-23 RX ORDER — SODIUM CHLORIDE 0.9 % (FLUSH) 0.9 %
10 SYRINGE (ML) INJECTION AS NEEDED
Status: DISCONTINUED | OUTPATIENT
Start: 2019-12-23 | End: 2019-12-23 | Stop reason: HOSPADM

## 2019-12-23 RX ORDER — LIDOCAINE HYDROCHLORIDE 10 MG/ML
INJECTION, SOLUTION INFILTRATION; PERINEURAL AS NEEDED
Status: DISCONTINUED | OUTPATIENT
Start: 2019-12-23 | End: 2019-12-23 | Stop reason: SURG

## 2019-12-23 RX ORDER — FAMOTIDINE 10 MG/ML
20 INJECTION, SOLUTION INTRAVENOUS ONCE
Status: DISCONTINUED | OUTPATIENT
Start: 2019-12-23 | End: 2019-12-23 | Stop reason: HOSPADM

## 2019-12-23 RX ORDER — SODIUM CHLORIDE 0.9 % (FLUSH) 0.9 %
10 SYRINGE (ML) INJECTION EVERY 12 HOURS SCHEDULED
Status: DISCONTINUED | OUTPATIENT
Start: 2019-12-23 | End: 2019-12-23 | Stop reason: HOSPADM

## 2019-12-23 RX ORDER — FENTANYL CITRATE 50 UG/ML
50 INJECTION, SOLUTION INTRAMUSCULAR; INTRAVENOUS
Status: DISCONTINUED | OUTPATIENT
Start: 2019-12-23 | End: 2019-12-23 | Stop reason: HOSPADM

## 2019-12-23 RX ORDER — FAMOTIDINE 20 MG/1
20 TABLET, FILM COATED ORAL ONCE
Status: DISCONTINUED | OUTPATIENT
Start: 2019-12-23 | End: 2019-12-23 | Stop reason: HOSPADM

## 2019-12-23 RX ORDER — PROPOFOL 10 MG/ML
VIAL (ML) INTRAVENOUS AS NEEDED
Status: DISCONTINUED | OUTPATIENT
Start: 2019-12-23 | End: 2019-12-23 | Stop reason: SURG

## 2019-12-23 RX ORDER — SODIUM CHLORIDE, SODIUM LACTATE, POTASSIUM CHLORIDE, CALCIUM CHLORIDE 600; 310; 30; 20 MG/100ML; MG/100ML; MG/100ML; MG/100ML
9 INJECTION, SOLUTION INTRAVENOUS CONTINUOUS
Status: DISCONTINUED | OUTPATIENT
Start: 2019-12-23 | End: 2019-12-23 | Stop reason: HOSPADM

## 2019-12-23 RX ORDER — ONDANSETRON 2 MG/ML
4 INJECTION INTRAMUSCULAR; INTRAVENOUS ONCE AS NEEDED
Status: DISCONTINUED | OUTPATIENT
Start: 2019-12-23 | End: 2019-12-23 | Stop reason: HOSPADM

## 2019-12-23 RX ADMIN — PROPOFOL 25 MG: 10 INJECTION, EMULSION INTRAVENOUS at 09:27

## 2019-12-23 RX ADMIN — PROPOFOL 50 MG: 10 INJECTION, EMULSION INTRAVENOUS at 09:26

## 2019-12-23 RX ADMIN — PROPOFOL 25 MG: 10 INJECTION, EMULSION INTRAVENOUS at 09:28

## 2019-12-23 RX ADMIN — LIDOCAINE HYDROCHLORIDE 50 MG: 10 INJECTION, SOLUTION INFILTRATION; PERINEURAL at 09:22

## 2019-12-23 RX ADMIN — SODIUM CHLORIDE, POTASSIUM CHLORIDE, SODIUM LACTATE AND CALCIUM CHLORIDE 9 ML/HR: 600; 310; 30; 20 INJECTION, SOLUTION INTRAVENOUS at 08:43

## 2019-12-23 RX ADMIN — PROPOFOL 25 MG: 10 INJECTION, EMULSION INTRAVENOUS at 09:30

## 2019-12-23 NOTE — INTERVAL H&P NOTE
H&P updated. The patient was examined and the following changes are noted:  Patient with recent esophageal food impaction. Presents for esophageal dilation.

## 2019-12-23 NOTE — ANESTHESIA PREPROCEDURE EVALUATION
Anesthesia Evaluation     Patient summary reviewed and Nursing notes reviewed   NPO Solid Status: > 8 hours  NPO Liquid Status: > 8 hours           Airway   Mallampati: II  TM distance: >3 FB  Neck ROM: full  No difficulty expected  Dental      Pulmonary    (-) COPD, asthma, recent URI, sleep apnea, not a smoker  Cardiovascular     (-) hypertension, past MI, dysrhythmias, angina, hyperlipidemia      Neuro/Psych  (-) seizures, CVA  GI/Hepatic/Renal/Endo    (-) liver disease, no renal disease, diabetes, no thyroid disorder    Musculoskeletal     Abdominal    Substance History      OB/GYN          Other        ROS/Med Hx Other: food impaction                   Anesthesia Plan    ASA 2     general     intravenous induction     Anesthetic plan, all risks, benefits, and alternatives have been provided, discussed and informed consent has been obtained with: patient.    Plan discussed with CRNA.

## 2019-12-23 NOTE — BRIEF OP NOTE
ESOPHAGOGASTRODUODENOSCOPY WITH DILATATION  Progress Note    Holly Tru Davis  12/23/2019    Moderately tight peptic stricture dilated to 48 Fr. Esophageal biopsies taken to r/o eosinophilic esophagitis.     >> Repeat dilation in ~ 1 month.    Mark I. Brunner, MD     Date: 12/23/2019  Time: 9:46 AM

## 2019-12-23 NOTE — ANESTHESIA POSTPROCEDURE EVALUATION
Patient: Holly Ott Susan    Procedure Summary     Date:  12/23/19 Room / Location:   [facility]    Anesthesia Start:  0922 Anesthesia Stop:  0943    Procedure:  ESOPHAGOGASTRODUODENOSCOPY WITH DILATATION (N/A ) Diagnosis:       Esophageal dysphagia      (Esophageal dysphagia [R13.10])    Surgeon:  Brunner, Mark I, MD Provider:  Patrick Chawla MD    Anesthesia Type:  general ASA Status:  2          Anesthesia Type: general    Vitals  Vitals Value Taken Time   BP     Temp     Pulse     Resp     SpO2 96 % 12/23/2019  9:43 AM   Vitals shown include unvalidated device data.        Post Anesthesia Care and Evaluation    Patient location during evaluation: PACU  Patient participation: complete - patient participated  Level of consciousness: awake and alert  Pain score: 0  Pain management: adequate  Airway patency: patent  Anesthetic complications: No anesthetic complications  PONV Status: none  Cardiovascular status: hemodynamically stable and acceptable  Respiratory status: nonlabored ventilation, acceptable and nasal cannula  Hydration status: acceptable

## 2019-12-24 LAB
CYTO UR: NORMAL
LAB AP CASE REPORT: NORMAL
LAB AP CLINICAL INFORMATION: NORMAL
LAB AP DIAGNOSIS COMMENT: NORMAL
PATH REPORT.FINAL DX SPEC: NORMAL
PATH REPORT.GROSS SPEC: NORMAL

## 2020-01-28 ENCOUNTER — ANESTHESIA EVENT (OUTPATIENT)
Dept: GASTROENTEROLOGY | Facility: HOSPITAL | Age: 75
End: 2020-01-28

## 2020-01-28 ENCOUNTER — ANESTHESIA (OUTPATIENT)
Dept: GASTROENTEROLOGY | Facility: HOSPITAL | Age: 75
End: 2020-01-28

## 2020-01-28 ENCOUNTER — HOSPITAL ENCOUNTER (OUTPATIENT)
Facility: HOSPITAL | Age: 75
Setting detail: HOSPITAL OUTPATIENT SURGERY
Discharge: HOME | End: 2020-01-28
Attending: INTERNAL MEDICINE | Admitting: INTERNAL MEDICINE

## 2020-01-28 VITALS
SYSTOLIC BLOOD PRESSURE: 118 MMHG | BODY MASS INDEX: 19.74 KG/M2 | OXYGEN SATURATION: 100 % | WEIGHT: 115.6 LBS | DIASTOLIC BLOOD PRESSURE: 61 MMHG | HEIGHT: 64 IN | HEART RATE: 68 BPM | RESPIRATION RATE: 16 BRPM | TEMPERATURE: 97.5 F

## 2020-01-28 PROCEDURE — S0260 H&P FOR SURGERY: HCPCS | Performed by: INTERNAL MEDICINE

## 2020-01-28 PROCEDURE — C1769 GUIDE WIRE: HCPCS | Performed by: INTERNAL MEDICINE

## 2020-01-28 PROCEDURE — 93005 ELECTROCARDIOGRAM TRACING: CPT | Performed by: ANESTHESIOLOGY

## 2020-01-28 PROCEDURE — 25010000002 DEXAMETHASONE PER 1 MG: Performed by: NURSE ANESTHETIST, CERTIFIED REGISTERED

## 2020-01-28 PROCEDURE — 25010000002 PROPOFOL 10 MG/ML EMULSION: Performed by: NURSE ANESTHETIST, CERTIFIED REGISTERED

## 2020-01-28 PROCEDURE — 25010000003 LIDOCAINE 1 % SOLUTION: Performed by: NURSE ANESTHETIST, CERTIFIED REGISTERED

## 2020-01-28 RX ORDER — PROMETHAZINE HYDROCHLORIDE 25 MG/1
25 SUPPOSITORY RECTAL ONCE AS NEEDED
Status: DISCONTINUED | OUTPATIENT
Start: 2020-01-28 | End: 2020-01-28 | Stop reason: HOSPADM

## 2020-01-28 RX ORDER — FENTANYL CITRATE 50 UG/ML
50 INJECTION, SOLUTION INTRAMUSCULAR; INTRAVENOUS
Status: DISCONTINUED | OUTPATIENT
Start: 2020-01-28 | End: 2020-01-28 | Stop reason: HOSPADM

## 2020-01-28 RX ORDER — EPHEDRINE SULFATE 50 MG/ML
5 INJECTION, SOLUTION INTRAVENOUS ONCE AS NEEDED
Status: DISCONTINUED | OUTPATIENT
Start: 2020-01-28 | End: 2020-01-28 | Stop reason: HOSPADM

## 2020-01-28 RX ORDER — FAMOTIDINE 10 MG/ML
20 INJECTION, SOLUTION INTRAVENOUS ONCE
Status: COMPLETED | OUTPATIENT
Start: 2020-01-28 | End: 2020-01-28

## 2020-01-28 RX ORDER — LIDOCAINE HYDROCHLORIDE 10 MG/ML
0.5 INJECTION, SOLUTION EPIDURAL; INFILTRATION; INTRACAUDAL; PERINEURAL ONCE AS NEEDED
Status: DISCONTINUED | OUTPATIENT
Start: 2020-01-28 | End: 2020-01-28 | Stop reason: HOSPADM

## 2020-01-28 RX ORDER — PROPOFOL 10 MG/ML
VIAL (ML) INTRAVENOUS AS NEEDED
Status: DISCONTINUED | OUTPATIENT
Start: 2020-01-28 | End: 2020-01-28 | Stop reason: SURG

## 2020-01-28 RX ORDER — DEXAMETHASONE SODIUM PHOSPHATE 10 MG/ML
INJECTION INTRAMUSCULAR; INTRAVENOUS AS NEEDED
Status: DISCONTINUED | OUTPATIENT
Start: 2020-01-28 | End: 2020-01-28 | Stop reason: SURG

## 2020-01-28 RX ORDER — PROMETHAZINE HYDROCHLORIDE 25 MG/ML
6.25 INJECTION, SOLUTION INTRAMUSCULAR; INTRAVENOUS ONCE AS NEEDED
Status: DISCONTINUED | OUTPATIENT
Start: 2020-01-28 | End: 2020-01-28 | Stop reason: HOSPADM

## 2020-01-28 RX ORDER — SODIUM CHLORIDE 0.9 % (FLUSH) 0.9 %
10 SYRINGE (ML) INJECTION AS NEEDED
Status: DISCONTINUED | OUTPATIENT
Start: 2020-01-28 | End: 2020-01-28 | Stop reason: HOSPADM

## 2020-01-28 RX ORDER — SODIUM CHLORIDE 0.9 % (FLUSH) 0.9 %
10 SYRINGE (ML) INJECTION EVERY 12 HOURS SCHEDULED
Status: DISCONTINUED | OUTPATIENT
Start: 2020-01-28 | End: 2020-01-28 | Stop reason: HOSPADM

## 2020-01-28 RX ORDER — SODIUM CHLORIDE, SODIUM LACTATE, POTASSIUM CHLORIDE, CALCIUM CHLORIDE 600; 310; 30; 20 MG/100ML; MG/100ML; MG/100ML; MG/100ML
9 INJECTION, SOLUTION INTRAVENOUS CONTINUOUS
Status: DISCONTINUED | OUTPATIENT
Start: 2020-01-28 | End: 2020-01-28 | Stop reason: HOSPADM

## 2020-01-28 RX ORDER — PROPOFOL 10 MG/ML
VIAL (ML) INTRAVENOUS CONTINUOUS PRN
Status: DISCONTINUED | OUTPATIENT
Start: 2020-01-28 | End: 2020-01-28 | Stop reason: SURG

## 2020-01-28 RX ORDER — PROMETHAZINE HYDROCHLORIDE 25 MG/1
25 TABLET ORAL ONCE AS NEEDED
Status: DISCONTINUED | OUTPATIENT
Start: 2020-01-28 | End: 2020-01-28 | Stop reason: HOSPADM

## 2020-01-28 RX ORDER — FAMOTIDINE 20 MG/1
20 TABLET, FILM COATED ORAL ONCE
Status: DISCONTINUED | OUTPATIENT
Start: 2020-01-28 | End: 2020-01-28 | Stop reason: HOSPADM

## 2020-01-28 RX ORDER — LIDOCAINE HYDROCHLORIDE 10 MG/ML
INJECTION, SOLUTION INFILTRATION; PERINEURAL AS NEEDED
Status: DISCONTINUED | OUTPATIENT
Start: 2020-01-28 | End: 2020-01-28 | Stop reason: SURG

## 2020-01-28 RX ADMIN — FAMOTIDINE 20 MG: 10 INJECTION INTRAVENOUS at 08:35

## 2020-01-28 RX ADMIN — LIDOCAINE HYDROCHLORIDE 50 MG: 10 INJECTION, SOLUTION INFILTRATION; PERINEURAL at 09:03

## 2020-01-28 RX ADMIN — SODIUM CHLORIDE, POTASSIUM CHLORIDE, SODIUM LACTATE AND CALCIUM CHLORIDE 9 ML/HR: 600; 310; 30; 20 INJECTION, SOLUTION INTRAVENOUS at 08:34

## 2020-01-28 RX ADMIN — PROPOFOL 50 MCG/KG/MIN: 10 INJECTION, EMULSION INTRAVENOUS at 09:03

## 2020-01-28 RX ADMIN — DEXAMETHASONE SODIUM PHOSPHATE 4 MG: 10 INJECTION INTRAMUSCULAR; INTRAVENOUS at 09:00

## 2020-01-28 RX ADMIN — PROPOFOL 50 MG: 10 INJECTION, EMULSION INTRAVENOUS at 09:03

## 2020-01-28 NOTE — ANESTHESIA PREPROCEDURE EVALUATION
Anesthesia Evaluation     Patient summary reviewed and Nursing notes reviewed   no history of anesthetic complications:  NPO Solid Status: > 8 hours  NPO Liquid Status: > 2 hours           Airway   Mallampati: II  TM distance: >3 FB  Neck ROM: full  No difficulty expected  Dental - normal exam     Pulmonary - negative pulmonary ROS and normal exam    breath sounds clear to auscultation  Cardiovascular - normal exam  Exercise tolerance: good (4-7 METS)    ECG reviewed  Rhythm: regular  Rate: normal    (+) hypertension, dysrhythmias Tachycardia, hyperlipidemia,       Neuro/Psych- negative ROS  GI/Hepatic/Renal/Endo      ROS Comment: Food impaction    Musculoskeletal (-) negative ROS    Abdominal    Substance History - negative use     OB/GYN          Other - negative ROS                       Anesthesia Plan    ASA 2     general   (PROPOFOL)  intravenous induction     Anesthetic plan, all risks, benefits, and alternatives have been provided, discussed and informed consent has been obtained with: patient.    Plan discussed with CRNA.

## 2020-01-28 NOTE — ANESTHESIA POSTPROCEDURE EVALUATION
Patient: Holly Ott Susan    Procedure Summary     Date:  01/28/20 Room / Location:   [facility]    Anesthesia Start:  0859 Anesthesia Stop:  0919    Procedure:  ESOPHAGOGASTRODUODENOSCOPY with dilitation (N/A ) Diagnosis:       Esophageal dysphagia      (Esophageal dysphagia [R13.10])    Surgeon:  Brunner, Mark I, MD Provider:  Raji Lr Jr., MD    Anesthesia Type:  general ASA Status:  2          Anesthesia Type: general    Vitals  No vitals data found for the desired time range.          Post Anesthesia Care and Evaluation    Patient location during evaluation: PACU  Patient participation: complete - patient participated  Level of consciousness: awake and alert  Pain score: 0  Pain management: adequate  Airway patency: patent  Anesthetic complications: No anesthetic complications  PONV Status: none  Cardiovascular status: hemodynamically stable and acceptable  Respiratory status: nonlabored ventilation, acceptable and nasal cannula  Hydration status: acceptable    Comments: Please see 1st vital signs from pacu documentation

## 2020-02-28 ENCOUNTER — TELEPHONE (OUTPATIENT)
Dept: OBSTETRICS AND GYNECOLOGY | Facility: CLINIC | Age: 75
End: 2020-02-28

## 2020-02-28 NOTE — TELEPHONE ENCOUNTER
Dr. Lentz wrote her prescription for Premarin and I advised her that we faxed it to the number that she gave us.  Patient verbalized understanding and had no questions at this time.    A copy of the prescription was scanned into her chart.

## 2020-02-28 NOTE — TELEPHONE ENCOUNTER
Patient would like for us to fax a prescription for her Premarin to [phone].  She stated that her insurance is wanting to charge her over $400 and she can get it from a [facility] for $70.  She would like to make sure that we write DO NOT SUBSTITUTE so she can get the name brand.  She stated that she will need refills.    Put Room 1309A on the fax cover sheet so that they get it to the right room.    Please give her a call at [phone]

## 2020-07-23 ENCOUNTER — TRANSCRIBE ORDERS (OUTPATIENT)
Dept: OBSTETRICS AND GYNECOLOGY | Facility: CLINIC | Age: 75
End: 2020-07-23

## 2020-07-23 DIAGNOSIS — Z12.31 VISIT FOR SCREENING MAMMOGRAM: Primary | ICD-10-CM

## 2020-10-09 ENCOUNTER — TELEPHONE (OUTPATIENT)
Dept: OBSTETRICS AND GYNECOLOGY | Facility: CLINIC | Age: 75
End: 2020-10-09

## 2020-10-09 NOTE — TELEPHONE ENCOUNTER
Patient called and was wanting to know if she could come in and  a prescription for her Premarin with several refills so she can have it filled through the [place] pharmacy.

## 2020-10-12 RX ORDER — RALOXIFENE HYDROCHLORIDE 60 MG/1
60 TABLET, FILM COATED ORAL DAILY
Qty: 90 TABLET | Refills: 0 | Status: SHIPPED | OUTPATIENT
Start: 2020-10-12 | End: 2020-11-03 | Stop reason: SDUPTHER

## 2020-10-12 NOTE — TELEPHONE ENCOUNTER
We received a fax request from pharmacy for refill (90 day) on Evista 60 mg.  Appointment 11/03/2020.    Please approve.

## 2020-10-19 ENCOUNTER — HOSPITAL ENCOUNTER (OUTPATIENT)
Dept: MAMMOGRAPHY | Facility: HOSPITAL | Age: 75
Discharge: HOME | End: 2020-10-19
Admitting: OBSTETRICS & GYNECOLOGY

## 2020-10-19 DIAGNOSIS — Z12.31 VISIT FOR SCREENING MAMMOGRAM: ICD-10-CM

## 2020-10-19 PROCEDURE — 77063 BREAST TOMOSYNTHESIS BI: CPT | Performed by: RADIOLOGY

## 2020-10-19 PROCEDURE — 77067 SCR MAMMO BI INCL CAD: CPT

## 2020-10-19 PROCEDURE — 77067 SCR MAMMO BI INCL CAD: CPT | Performed by: RADIOLOGY

## 2020-10-19 PROCEDURE — 77063 BREAST TOMOSYNTHESIS BI: CPT

## 2020-11-03 ENCOUNTER — OFFICE VISIT (OUTPATIENT)
Dept: OBSTETRICS AND GYNECOLOGY | Facility: CLINIC | Age: 75
End: 2020-11-03

## 2020-11-03 VITALS
HEIGHT: 64 IN | WEIGHT: 109.6 LBS | DIASTOLIC BLOOD PRESSURE: 74 MMHG | BODY MASS INDEX: 18.71 KG/M2 | SYSTOLIC BLOOD PRESSURE: 112 MMHG

## 2020-11-03 DIAGNOSIS — Z78.0 MENOPAUSE: ICD-10-CM

## 2020-11-03 DIAGNOSIS — Z01.419 ENCOUNTER FOR GYNECOLOGICAL EXAMINATION WITHOUT ABNORMAL FINDING: Primary | ICD-10-CM

## 2020-11-03 DIAGNOSIS — M81.0 AGE-RELATED OSTEOPOROSIS WITHOUT CURRENT PATHOLOGICAL FRACTURE: ICD-10-CM

## 2020-11-03 DIAGNOSIS — N95.2 VAGINAL ATROPHY: ICD-10-CM

## 2020-11-03 PROCEDURE — G0101 CA SCREEN;PELVIC/BREAST EXAM: HCPCS | Performed by: OBSTETRICS & GYNECOLOGY

## 2020-11-03 RX ORDER — CONJUGATED ESTROGENS 0.62 MG/G
0.5 CREAM VAGINAL 2 TIMES WEEKLY
COMMUNITY
End: 2020-11-19

## 2020-11-03 RX ORDER — LISINOPRIL 20 MG/1
20 TABLET ORAL DAILY
COMMUNITY
End: 2021-04-21 | Stop reason: SDUPTHER

## 2020-11-03 RX ORDER — RALOXIFENE HYDROCHLORIDE 60 MG/1
60 TABLET, FILM COATED ORAL DAILY
Qty: 90 TABLET | Refills: 3 | Status: SHIPPED | OUTPATIENT
Start: 2020-11-03 | End: 2020-11-19

## 2020-11-03 NOTE — PROGRESS NOTES
Chief Complaint   Patient presents with   • Gynecologic Exam   • Med Refill     needs written prescription for Premarin vaginal cream to send to pharmacy in [place].       Holly Davis is a 74 y.o. year old  presenting to be seen for her annual exam.  This patient is menopausal and does not use systemic estrogen/progestin replacement therapy.  She has previously had an abdominal hysterectomy/bilateral salpingo-oophorectomy.  She has a history of age-related osteoporosis with stress fractures.  She is treated with raloxifene, 60 mg daily.  She has no side effects on this medication.  Her last DEXA was read as osteopenia of multiple sites.  She has a history of vaginal atrophy and uses Premarin vaginal cream in a dose of 0.5 g twice a week.  She denies bowel or urinary symptoms.    SCREENING TESTS    Year 2012   Age                         PAP                         HPV high risk                         Mammogram        benign benign                SWATHI score                         Breast MRI                         Lipids                         Vitamin D                         Colonoscopy                         DEXA  Frax (hip/any)       osteopenia                  Ovarian Screen                             She exercises regularly: yes.  She wears her seat belt: yes.  She has concerns about domestic violence: no.  She has noticed changes in height: no    GYN screening history:  · Last mammogram: was done on approximately 10/19/2020 and the result was: Birads I (Normal).  · Last DEXA: was done on approximately 10/15/2018 and the results were: osteopenia of hips and osteopenia of spine.    No Additional Complaints Reported    The following portions of the patient's history were reviewed and updated as appropriate:vital signs and   She  has a past medical history of Bursitis, Menopause,  Monoclonal gammopathy, Osteoporosis, SI (sacroiliac) pain, Sinus tachycardia, Stress fracture of ankle, and Stress fracture of foot.  She does not have any pertinent problems on file.  She  has a past surgical history that includes Knee cartilage surgery; Cataract extraction w/ intraocular lens  implant, bilateral; Appendectomy; Back surgery; Colonoscopy; knee arthroplasty, partial replacement (Right, 06/02/2017); Replacement total knee (Left, 04/2018); knee arthroplasty, partial replacement (Left, 04/16/2018); Esophagus foreign body removal (N/A, 12/1/2019); Esophagogastroduodenoscopy (N/A, 12/23/2019); Esophagogastroduodenoscopy (N/A, 1/28/2020); Breast excisional biopsy (Left, 1980); Breast cyst aspiration (Left); Oophorectomy (Bilateral, 1996); and Hysterectomy (1996).  Her family history includes Breast cancer (age of onset: 60) in her paternal cousin; Breast cancer (age of onset: 64) in her maternal aunt; Breast cancer (age of onset: 65) in her maternal cousin.  She  reports that she has never smoked. She has never used smokeless tobacco. She reports current alcohol use. She reports that she does not use drugs.  Current Outpatient Medications   Medication Sig Dispense Refill   • lisinopril (PRINIVIL,ZESTRIL) 20 MG tablet Take 20 mg by mouth Daily.     • Calcium-Magnesium-Vitamin D - MG-MG-UNIT tablet sustained-release 24 hour Take 1 tablet by mouth Daily.     • conjugated estrogens (Premarin) 0.625 MG/GM vaginal cream Insert 0.5 g into the vagina 2 (Two) Times a Week.     • finasteride (PROSCAR) 5 MG tablet Take 1 tablet by mouth Daily.     • fluvastatin XL (LESCOL XL) 80 MG 24 hr tablet Take 1 tablet by mouth Daily.  0   • metoprolol succinate XL (TOPROL-XL) 25 MG 24 hr tablet Take 1 tablet by mouth Daily.  0   • omeprazole (priLOSEC) 20 MG capsule Take 1 capsule by mouth Daily. 30 capsule 11   • Probiotic Product (PROBIOTIC DAILY PO) Take 1 tablet by mouth Daily.     • raloxifene (Evista) 60 MG  "tablet Take 1 tablet by mouth Daily. 90 tablet 3   • RESTASIS 0.05 % ophthalmic emulsion Administer 1 drop to both eyes 2 (Two) Times a Day.  3   • senna-docusate (PERICOLACE) 8.6-50 MG per tablet Take 2 tablets by mouth Daily.     • temazepam (RESTORIL) 15 MG capsule Take 1 capsule by mouth As Needed.  1   • traZODone (DESYREL) 100 MG tablet Take 1 tablet by mouth Daily.  0   • Vitamin D, Cholecalciferol, 1000 UNITS capsule Take 1,000 Int'l Units by mouth Daily.       No current facility-administered medications for this visit.      She is allergic to cefdinir; midazolam; other; sulfa antibiotics; and trimethoprim..    Review of Systems  A review of systems was taken.  She denies cough, fever, shortness of breath, and loss of her sense of taste or smell  Constitutional: negative for fever, chills, activity change, appetite change, fatigue and unexpected weight change.  Respiratory: negative  Cardiovascular: negative  Gastrointestinal: negative  Genitourinary:negative  Musculoskeletal:negative  Behavioral/Psych: negative     Counseling/Anticipatory Guidance Discussed: nutrition, physical activity, healthy weight, injury prevention, immunizations, screenings, self-breast exam and bone health  /74   Ht 162.6 cm (64\")   Wt 49.7 kg (109 lb 9.6 oz)   LMP  (LMP Unknown)   Breastfeeding No   BMI 18.81 kg/m²     Physical Exam    General:  alert; cooperative; well developed; well nourished   Skin:  No suspicious lesions seen   Thyroid: normal to inspection and palpation   Lungs:  clear to auscultation bilaterally   Heart:  regular rate and rhythm, S1, S2 normal, no murmur, click, rub or gallop   Breasts:  Examined in supine position  Symmetric without masses or skin dimpling  Nipples normal without inversion, lesions or discharge  There are no palpable axillary nodes  Fibrocystic changes are present both breasts without a discrete mass   Abdomen: soft, non-tender; no masses  no umbilical or inguinal hernias are " present  no hepato-splenomegaly   Pelvis: Clinical staff was present for exam  External genitalia:  normal appearance of the external genitalia including Bartholin's and Narciso Pena's glands.  Vaginal:  normal pink mucosa without prolapse or lesions.  Cervix:  absent.  Uterus:  absent.  Adnexa:  absent, bilateral.  Rectal:  anus visually normal appearing. recto-vaginal exam unremarkable and confirms findings;     Lab Review   CBC results and CMP results    Imaging  Mammogram results and DEXA         Advance directives- NO but has a living will      ASSESSMENT  Problems Addressed this Visit        Musculoskeletal and Integument    Osteoporosis    Relevant Medications    raloxifene (Evista) 60 MG tablet       Genitourinary    Vaginal atrophy    Menopause      Other Visit Diagnoses     Encounter for gynecological examination without abnormal finding    -  Primary      Diagnoses       Codes Comments    Encounter for gynecological examination without abnormal finding    -  Primary ICD-10-CM: Z01.419  ICD-9-CM: V72.31     Menopause     ICD-10-CM: Z78.0  ICD-9-CM: 627.2     Vaginal atrophy     ICD-10-CM: N95.2  ICD-9-CM: 627.3     Age-related osteoporosis without current pathological fracture     ICD-10-CM: M81.0  ICD-9-CM: 733.01               Substance History:   reports that she has never smoked. She has never used smokeless tobacco.   reports current alcohol use.   reports no history of drug use.    Substance use counseling is not indicated based on patient history.  She indicates that her alcohol use is social, and controlled.      PLAN    Medications prescribed this encounter:    New Medications Ordered This Visit   Medications   • raloxifene (Evista) 60 MG tablet     Sig: Take 1 tablet by mouth Daily.     Dispense:  90 tablet     Refill:  3   · Premarin vaginal cream, 30 g, in a dose of 0.5 g twice a week  · Monthly self breast assessment and annual breast imaging  · Repeat DEXA in 1 year  · Calcium, 600 mg/ Vit. D, 400  IU daily; regular weight-bearing exercise  · Follow up: 12 month(s)  *Please note that portions of this documentation may have been completed with a voice recognition program.  Efforts were made to edit this dictation, but occasional words may have been mistranscribed.       This note was electronically signed.    LEA Lentz MD  November 3, 2020  13:30 EST

## 2020-11-05 ENCOUNTER — TELEPHONE (OUTPATIENT)
Dept: ONCOLOGY | Facility: CLINIC | Age: 75
End: 2020-11-05

## 2020-11-05 NOTE — TELEPHONE ENCOUNTER
Pt asked to have order for labs sent to [facility]. She wants to draw labs on 11.12.20 for upcoming appt on 11.19.20.    [phone] PH

## 2020-11-12 ENCOUNTER — LAB (OUTPATIENT)
Dept: LAB | Facility: HOSPITAL | Age: 75
End: 2020-11-12

## 2020-11-12 DIAGNOSIS — D47.2 MONOCLONAL GAMMOPATHY: ICD-10-CM

## 2020-11-12 LAB
ALBUMIN SERPL-MCNC: 4.2 G/DL (ref 3.5–5.2)
ALBUMIN/GLOB SERPL: 1.6 G/DL
ALP SERPL-CCNC: 135 U/L (ref 39–117)
ALT SERPL W P-5'-P-CCNC: 80 U/L (ref 1–33)
ANION GAP SERPL CALCULATED.3IONS-SCNC: 11 MMOL/L (ref 5–15)
AST SERPL-CCNC: 37 U/L (ref 1–32)
BASOPHILS # BLD AUTO: 0.09 10*3/MM3 (ref 0–0.2)
BASOPHILS NFR BLD AUTO: 1.8 % (ref 0–1.5)
BILIRUB SERPL-MCNC: 0.8 MG/DL (ref 0–1.2)
BUN SERPL-MCNC: 16 MG/DL (ref 8–23)
BUN/CREAT SERPL: 26.7 (ref 7–25)
CALCIUM SPEC-SCNC: 9.3 MG/DL (ref 8.6–10.5)
CHLORIDE SERPL-SCNC: 105 MMOL/L (ref 98–107)
CO2 SERPL-SCNC: 23 MMOL/L (ref 22–29)
CREAT SERPL-MCNC: 0.6 MG/DL (ref 0.57–1)
DEPRECATED RDW RBC AUTO: 56.1 FL (ref 37–54)
EOSINOPHIL # BLD AUTO: 0.7 10*3/MM3 (ref 0–0.4)
EOSINOPHIL NFR BLD AUTO: 13.8 % (ref 0.3–6.2)
ERYTHROCYTE [DISTWIDTH] IN BLOOD BY AUTOMATED COUNT: 16.2 % (ref 12.3–15.4)
GFR SERPL CREATININE-BSD FRML MDRD: 97 ML/MIN/1.73
GLOBULIN UR ELPH-MCNC: 2.7 GM/DL
GLUCOSE SERPL-MCNC: 92 MG/DL (ref 65–99)
HCT VFR BLD AUTO: 40.3 % (ref 34–46.6)
HGB BLD-MCNC: 12.2 G/DL (ref 12–15.9)
IMM GRANULOCYTES # BLD AUTO: 0.02 10*3/MM3 (ref 0–0.05)
IMM GRANULOCYTES NFR BLD AUTO: 0.4 % (ref 0–0.5)
LYMPHOCYTES # BLD AUTO: 2.02 10*3/MM3 (ref 0.7–3.1)
LYMPHOCYTES NFR BLD AUTO: 39.8 % (ref 19.6–45.3)
MCH RBC QN AUTO: 28.9 PG (ref 26.6–33)
MCHC RBC AUTO-ENTMCNC: 30.3 G/DL (ref 31.5–35.7)
MCV RBC AUTO: 95.5 FL (ref 79–97)
MONOCYTES # BLD AUTO: 0.57 10*3/MM3 (ref 0.1–0.9)
MONOCYTES NFR BLD AUTO: 11.2 % (ref 5–12)
NEUTROPHILS NFR BLD AUTO: 1.67 10*3/MM3 (ref 1.7–7)
NEUTROPHILS NFR BLD AUTO: 33 % (ref 42.7–76)
NRBC BLD AUTO-RTO: 0 /100 WBC (ref 0–0.2)
PLATELET # BLD AUTO: 333 10*3/MM3 (ref 140–450)
PMV BLD AUTO: 11 FL (ref 6–12)
POTASSIUM SERPL-SCNC: 4.3 MMOL/L (ref 3.5–5.2)
PROT SERPL-MCNC: 6.9 G/DL (ref 6–8.5)
RBC # BLD AUTO: 4.22 10*6/MM3 (ref 3.77–5.28)
SODIUM SERPL-SCNC: 139 MMOL/L (ref 136–145)
WBC # BLD AUTO: 5.07 10*3/MM3 (ref 3.4–10.8)

## 2020-11-12 PROCEDURE — 82784 ASSAY IGA/IGD/IGG/IGM EACH: CPT

## 2020-11-12 PROCEDURE — 80053 COMPREHEN METABOLIC PANEL: CPT

## 2020-11-12 PROCEDURE — 84165 PROTEIN E-PHORESIS SERUM: CPT

## 2020-11-12 PROCEDURE — 86334 IMMUNOFIX E-PHORESIS SERUM: CPT

## 2020-11-12 PROCEDURE — 36415 COLL VENOUS BLD VENIPUNCTURE: CPT

## 2020-11-12 PROCEDURE — 83883 ASSAY NEPHELOMETRY NOT SPEC: CPT

## 2020-11-12 PROCEDURE — 85025 COMPLETE CBC W/AUTO DIFF WBC: CPT

## 2020-11-13 LAB
ALBUMIN SERPL ELPH-MCNC: 3.5 G/DL (ref 2.9–4.4)
ALBUMIN/GLOB SERPL: 1.2 {RATIO} (ref 0.7–1.7)
ALPHA1 GLOB SERPL ELPH-MCNC: 0.4 G/DL (ref 0–0.4)
ALPHA2 GLOB SERPL ELPH-MCNC: 0.9 G/DL (ref 0.4–1)
B-GLOBULIN SERPL ELPH-MCNC: 0.9 G/DL (ref 0.7–1.3)
GAMMA GLOB SERPL ELPH-MCNC: 0.8 G/DL (ref 0.4–1.8)
GLOBULIN SER-MCNC: 3 G/DL (ref 2.2–3.9)
IGA SERPL-MCNC: 92 MG/DL (ref 64–422)
IGG SERPL-MCNC: 995 MG/DL (ref 586–1602)
IGM SERPL-MCNC: 118 MG/DL (ref 26–217)
INTERPRETATION SERPL IEP-IMP: ABNORMAL
KAPPA LC FREE SER-MCNC: 17.8 MG/L (ref 3.3–19.4)
KAPPA LC FREE/LAMBDA FREE SER: 0.44 {RATIO} (ref 0.26–1.65)
LABORATORY COMMENT REPORT: ABNORMAL
LAMBDA LC FREE SERPL-MCNC: 40.8 MG/L (ref 5.7–26.3)
M PROTEIN SERPL ELPH-MCNC: 0.6 G/DL
PROT SERPL-MCNC: 6.5 G/DL (ref 6–8.5)

## 2020-11-19 ENCOUNTER — OFFICE VISIT (OUTPATIENT)
Dept: ONCOLOGY | Facility: CLINIC | Age: 75
End: 2020-11-19

## 2020-11-19 DIAGNOSIS — R94.5 ABNORMAL RESULTS OF LIVER FUNCTION STUDIES: ICD-10-CM

## 2020-11-19 DIAGNOSIS — D47.2 MONOCLONAL GAMMOPATHY: ICD-10-CM

## 2020-11-19 DIAGNOSIS — R23.3 EASY BRUISING: Primary | ICD-10-CM

## 2020-11-19 DIAGNOSIS — R79.1 ABNORMAL COAGULATION PROFILE: ICD-10-CM

## 2020-11-19 PROCEDURE — 99443 PR PHYS/QHP TELEPHONE EVALUATION 21-30 MIN: CPT | Performed by: INTERNAL MEDICINE

## 2020-11-19 NOTE — PROGRESS NOTES
"  Visit conducted via telephone.  Start time 12:41.  End time 1:04.    You have chosen to receive care through a telephone visit. Do you consent to use a telephone visit for your medical care today? Yes      PROBLEM LIST:  1. 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  2. osteoprosis  3. Tachycardia  4. hyperlipidemia    Subjective      Cc: mgus    HISTORY OF PRESENT ILLNESS:   Holly Ott Captain Cook returns for follow-up.      She had sent a photo of some bruising on her hands and forearms.  She has noticed this for about 9 months.  She stopped taking aspirin about a year ago.  She takes ibuprofen occasionally for a headache.  She had been prescribed some HRT with estrogen and progesterone about 3 days before these labs were drawn.  She is now off these medications because it caused some nipple soreness and swelling.      She otherwise is feeling well.    Her  (retired OB/anesthesia?) is present for the visit and contributes some to the history.    Past Medical History, Past Surgical History, Social History, Family History have been reviewed and are without significant changes except as mentioned.    Review of Systems   A comprehensive 14 point review of systems was performed and was negative except as mentioned.    Medications:  The current medication list was reviewed in the EMR    ALLERGIES:    Allergies   Allergen Reactions   • Cefdinir Other (See Comments)     \"OMNICEF (CEFDINIR) GAVE ME C-DIFF-SEVERAL YEARS AGO\"   • Midazolam Other (See Comments)     \"LAST TIME TOOK VERSED,COULD NOT BREATHE,WAS GIVEN REVERSAL DRUG\"   • Other      OMNI IV   • Sulfa Antibiotics Rash   • Trimethoprim Unknown (See Comments) and Rash       Objective      LMP  (LMP Unknown)      Performance Status: 0    General:  female in no acute distress  Neuro: alert and oriented  Psych: mood and affect appropriate      RECENT " LABS:  Lab Results   Component Value Date    WBC 5.07 11/12/2020    HGB 12.2 11/12/2020    HCT 40.3 11/12/2020    MCV 95.5 11/12/2020     11/12/2020     Lab Results   Component Value Date    GLUCOSE 92 11/12/2020    BUN 16 11/12/2020    CREATININE 0.60 11/12/2020    EGFRIFNONA 97 11/12/2020    BCR 26.7 (H) 11/12/2020    K 4.3 11/12/2020    CO2 23.0 11/12/2020    CALCIUM 9.3 11/12/2020    PROTENTOTREF 6.5 11/12/2020    ALBUMIN 4.20 11/12/2020    ALBUMIN 3.5 11/12/2020    LABIL2 1.2 11/12/2020    AST 37 (H) 11/12/2020    ALT 80 (H) 11/12/2020     No results found for: SPEP, UPEP   Results for CHRIS LIANG (MRN [id number]) as of 11/19/2020 12:40   Ref. Range 11/12/2020 11:26   Globulin Latest Ref Range: 2.2 - 3.9 g/dL 3.0   Alpha-1-Globulin Latest Ref Range: 0.0 - 0.4 g/dL 0.4   Alpha-2-Globulin Latest Ref Range: 0.4 - 1.0 g/dL 0.9   Beta Globulin Latest Ref Range: 0.7 - 1.3 g/dL 0.9   Gamma Globulin Latest Ref Range: 0.4 - 1.8 g/dL 0.8   M-Albino Latest Ref Range: Not Observed g/dL 0.6 (H)   Immunofixation Reflex, Serum Unknown Comment (A)     Results for CHRIS LIANG (MRN [id number]) as of 11/19/2020 12:40   Ref. Range 11/12/2020 11:26   IgA Latest Ref Range: 64 - 422 mg/dL 92   IgG Latest Ref Range: 586 - 1602 mg/dL 995   IgM Latest Ref Range: 26 - 217 mg/dL 118   Castella FLC Latest Ref Range: 3.3 - 19.4 mg/L 17.8   Free Lambda Light Chains Latest Ref Range: 5.7 - 26.3 mg/L 40.8 (H)   Kappa/Lambda Ratio Latest Ref Range: 0.26 - 1.65  0.44     Photo sent by patient.      Assessment/Plan   Chris Ott Susan is a 74 y.o. 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  She has no evidence of progression.      She does have a new elevation of her liver enzymes of uncertain etiology.  It may be related to be hormone replacement therapy that she has been started taking shortly before these labs were done.  She is now off of this medicine.  I will have her recheck her labs in  about 2 weeks.  I will also check PT and PTT at that time to evaluate her bruising.  We will contact her with those results.    Otherwise, F/u in 1 year with repeat labs.                I spent 15 minutes with the patient. I spent > 50% percent of this time counseling and discussing prognosis, diagnostic testing, evaluation, current status and management.    Nazia Monreal MD  [facility]    11/19/2020          CC:

## 2020-11-20 ENCOUNTER — TELEPHONE (OUTPATIENT)
Dept: ONCOLOGY | Facility: CLINIC | Age: 75
End: 2020-11-20

## 2020-11-20 NOTE — TELEPHONE ENCOUNTER
PT:SELF    PT CALLING TO LET YOU KNOW THAT DR. RODAS DID NOT PRESCRIBE THE ESTROGEN IT WAS DR. TURCIOS IN [facility]   SHE STOPPED TAKING THIS MEDICATION.      PT #: [phone]

## 2020-12-03 ENCOUNTER — LAB (OUTPATIENT)
Dept: LAB | Facility: HOSPITAL | Age: 75
End: 2020-12-03

## 2020-12-03 ENCOUNTER — TELEPHONE (OUTPATIENT)
Dept: ONCOLOGY | Facility: CLINIC | Age: 75
End: 2020-12-03

## 2020-12-03 DIAGNOSIS — R79.1 ABNORMAL COAGULATION PROFILE: ICD-10-CM

## 2020-12-03 DIAGNOSIS — D47.2 MONOCLONAL GAMMOPATHY: ICD-10-CM

## 2020-12-03 DIAGNOSIS — R94.5 ABNORMAL RESULTS OF LIVER FUNCTION STUDIES: ICD-10-CM

## 2020-12-03 DIAGNOSIS — R23.3 EASY BRUISING: ICD-10-CM

## 2020-12-03 LAB
ALBUMIN SERPL-MCNC: 3.9 G/DL (ref 3.5–5.2)
ALBUMIN/GLOB SERPL: 1.3 G/DL
ALP SERPL-CCNC: 116 U/L (ref 39–117)
ALT SERPL W P-5'-P-CCNC: 31 U/L (ref 1–33)
ANION GAP SERPL CALCULATED.3IONS-SCNC: 9 MMOL/L (ref 5–15)
APTT PPP: 23.8 SECONDS (ref 24–37)
AST SERPL-CCNC: 27 U/L (ref 1–32)
BASOPHILS # BLD AUTO: 0.05 10*3/MM3 (ref 0–0.2)
BASOPHILS NFR BLD AUTO: 0.9 % (ref 0–1.5)
BILIRUB SERPL-MCNC: 0.5 MG/DL (ref 0–1.2)
BUN SERPL-MCNC: 14 MG/DL (ref 8–23)
BUN/CREAT SERPL: 23 (ref 7–25)
CALCIUM SPEC-SCNC: 9.3 MG/DL (ref 8.6–10.5)
CHLORIDE SERPL-SCNC: 107 MMOL/L (ref 98–107)
CO2 SERPL-SCNC: 21 MMOL/L (ref 22–29)
CREAT SERPL-MCNC: 0.61 MG/DL (ref 0.57–1)
D DIMER PPP FEU-MCNC: 0.39 MCGFEU/ML (ref 0–0.56)
DEPRECATED RDW RBC AUTO: 53 FL (ref 37–54)
EOSINOPHIL # BLD AUTO: 0.35 10*3/MM3 (ref 0–0.4)
EOSINOPHIL NFR BLD AUTO: 6.3 % (ref 0.3–6.2)
ERYTHROCYTE [DISTWIDTH] IN BLOOD BY AUTOMATED COUNT: 15.1 % (ref 12.3–15.4)
GFR SERPL CREATININE-BSD FRML MDRD: 95 ML/MIN/1.73
GLOBULIN UR ELPH-MCNC: 3 GM/DL
GLUCOSE SERPL-MCNC: 87 MG/DL (ref 65–99)
HCT VFR BLD AUTO: 41 % (ref 34–46.6)
HGB BLD-MCNC: 12.2 G/DL (ref 12–15.9)
IMM GRANULOCYTES # BLD AUTO: 0.01 10*3/MM3 (ref 0–0.05)
IMM GRANULOCYTES NFR BLD AUTO: 0.2 % (ref 0–0.5)
INR PPP: 1.09 (ref 0.85–1.16)
LYMPHOCYTES # BLD AUTO: 2.42 10*3/MM3 (ref 0.7–3.1)
LYMPHOCYTES NFR BLD AUTO: 43.4 % (ref 19.6–45.3)
MCH RBC QN AUTO: 28.1 PG (ref 26.6–33)
MCHC RBC AUTO-ENTMCNC: 29.8 G/DL (ref 31.5–35.7)
MCV RBC AUTO: 94.5 FL (ref 79–97)
MONOCYTES # BLD AUTO: 0.5 10*3/MM3 (ref 0.1–0.9)
MONOCYTES NFR BLD AUTO: 9 % (ref 5–12)
NEUTROPHILS NFR BLD AUTO: 2.24 10*3/MM3 (ref 1.7–7)
NEUTROPHILS NFR BLD AUTO: 40.2 % (ref 42.7–76)
NRBC BLD AUTO-RTO: 0 /100 WBC (ref 0–0.2)
PLATELET # BLD AUTO: 133 10*3/MM3 (ref 140–450)
PMV BLD AUTO: 11.5 FL (ref 6–12)
POTASSIUM SERPL-SCNC: 4.5 MMOL/L (ref 3.5–5.2)
PROT SERPL-MCNC: 6.9 G/DL (ref 6–8.5)
PROTHROMBIN TIME: 13.8 SECONDS (ref 11.5–14)
RBC # BLD AUTO: 4.34 10*6/MM3 (ref 3.77–5.28)
SODIUM SERPL-SCNC: 137 MMOL/L (ref 136–145)
TSH SERPL DL<=0.05 MIU/L-ACNC: 2.67 UIU/ML (ref 0.27–4.2)
WBC # BLD AUTO: 5.57 10*3/MM3 (ref 3.4–10.8)

## 2020-12-03 PROCEDURE — 84165 PROTEIN E-PHORESIS SERUM: CPT

## 2020-12-03 PROCEDURE — 83883 ASSAY NEPHELOMETRY NOT SPEC: CPT

## 2020-12-03 PROCEDURE — 85379 FIBRIN DEGRADATION QUANT: CPT

## 2020-12-03 PROCEDURE — 36415 COLL VENOUS BLD VENIPUNCTURE: CPT

## 2020-12-03 PROCEDURE — 80053 COMPREHEN METABOLIC PANEL: CPT

## 2020-12-03 PROCEDURE — 85610 PROTHROMBIN TIME: CPT

## 2020-12-03 PROCEDURE — 84443 ASSAY THYROID STIM HORMONE: CPT

## 2020-12-03 PROCEDURE — 82784 ASSAY IGA/IGD/IGG/IGM EACH: CPT

## 2020-12-03 PROCEDURE — 85730 THROMBOPLASTIN TIME PARTIAL: CPT

## 2020-12-03 PROCEDURE — 85025 COMPLETE CBC W/AUTO DIFF WBC: CPT

## 2020-12-03 PROCEDURE — 86334 IMMUNOFIX E-PHORESIS SERUM: CPT

## 2020-12-03 NOTE — TELEPHONE ENCOUNTER
Called pt to let her know repeat LFTs were normal, coags normal as well.     F/u as scheduled in 1 year.

## 2020-12-04 LAB
ALBUMIN SERPL ELPH-MCNC: 3.8 G/DL (ref 2.9–4.4)
ALBUMIN/GLOB SERPL: 1.4 {RATIO} (ref 0.7–1.7)
ALPHA1 GLOB SERPL ELPH-MCNC: 0.3 G/DL (ref 0–0.4)
ALPHA2 GLOB SERPL ELPH-MCNC: 0.7 G/DL (ref 0.4–1)
B-GLOBULIN SERPL ELPH-MCNC: 0.9 G/DL (ref 0.7–1.3)
GAMMA GLOB SERPL ELPH-MCNC: 1 G/DL (ref 0.4–1.8)
GLOBULIN SER-MCNC: 2.9 G/DL (ref 2.2–3.9)
IGA SERPL-MCNC: 68 MG/DL (ref 64–422)
IGG SERPL-MCNC: 1010 MG/DL (ref 586–1602)
IGM SERPL-MCNC: 111 MG/DL (ref 26–217)
INTERPRETATION SERPL IEP-IMP: ABNORMAL
KAPPA LC FREE SER-MCNC: 13.1 MG/L (ref 3.3–19.4)
KAPPA LC FREE/LAMBDA FREE SER: 0.55 {RATIO} (ref 0.26–1.65)
LABORATORY COMMENT REPORT: ABNORMAL
LAMBDA LC FREE SERPL-MCNC: 23.9 MG/L (ref 5.7–26.3)
M PROTEIN SERPL ELPH-MCNC: 0.6 G/DL
PROT SERPL-MCNC: 6.7 G/DL (ref 6–8.5)

## 2021-04-19 ENCOUNTER — LAB (OUTPATIENT)
Dept: CARDIOLOGY | Facility: CLINIC | Age: 76
End: 2021-04-19

## 2021-04-19 DIAGNOSIS — E78.2 MIXED HYPERLIPIDEMIA: ICD-10-CM

## 2021-04-19 DIAGNOSIS — I10 ESSENTIAL HYPERTENSION, BENIGN: Primary | ICD-10-CM

## 2021-04-19 DIAGNOSIS — R53.83 FATIGUE, UNSPECIFIED TYPE: ICD-10-CM

## 2021-04-20 LAB
ALBUMIN SERPL-MCNC: 4.1 G/DL (ref 3.7–4.7)
ALBUMIN/GLOB SERPL: 1.7 {RATIO} (ref 1.2–2.2)
ALP SERPL-CCNC: 100 IU/L (ref 39–117)
ALT SERPL-CCNC: 18 IU/L (ref 0–32)
AST SERPL-CCNC: 14 IU/L (ref 0–40)
BASOPHILS # BLD AUTO: 0 X10E3/UL (ref 0–0.2)
BASOPHILS NFR BLD AUTO: 1 %
BILIRUB SERPL-MCNC: 0.6 MG/DL (ref 0–1.2)
BUN SERPL-MCNC: 17 MG/DL (ref 8–27)
BUN/CREAT SERPL: 24 (ref 12–28)
CALCIUM SERPL-MCNC: 8.9 MG/DL (ref 8.7–10.3)
CHLORIDE SERPL-SCNC: 103 MMOL/L (ref 96–106)
CHOLEST SERPL-MCNC: 158 MG/DL (ref 100–199)
CK SERPL-CCNC: 36 U/L (ref 32–182)
CO2 SERPL-SCNC: 23 MMOL/L (ref 20–29)
CREAT SERPL-MCNC: 0.71 MG/DL (ref 0.57–1)
CRP SERPL HS-MCNC: 2.35 MG/L (ref 0–3)
EOSINOPHIL # BLD AUTO: 0.1 X10E3/UL (ref 0–0.4)
EOSINOPHIL NFR BLD AUTO: 2 %
ERYTHROCYTE [DISTWIDTH] IN BLOOD BY AUTOMATED COUNT: 13.5 % (ref 11.7–15.4)
GLOBULIN SER CALC-MCNC: 2.4 G/DL (ref 1.5–4.5)
GLUCOSE SERPL-MCNC: 86 MG/DL (ref 65–99)
HCT VFR BLD AUTO: 39.2 % (ref 34–46.6)
HDLC SERPL-MCNC: 68 MG/DL
HGB BLD-MCNC: 12.6 G/DL (ref 11.1–15.9)
IMM GRANULOCYTES # BLD AUTO: 0 X10E3/UL (ref 0–0.1)
IMM GRANULOCYTES NFR BLD AUTO: 0 %
LDLC SERPL CALC-MCNC: 77 MG/DL (ref 0–99)
LYMPHOCYTES # BLD AUTO: 2.1 X10E3/UL (ref 0.7–3.1)
LYMPHOCYTES NFR BLD AUTO: 39 %
MCH RBC QN AUTO: 29.4 PG (ref 26.6–33)
MCHC RBC AUTO-ENTMCNC: 32.1 G/DL (ref 31.5–35.7)
MCV RBC AUTO: 92 FL (ref 79–97)
MONOCYTES # BLD AUTO: 0.5 X10E3/UL (ref 0.1–0.9)
MONOCYTES NFR BLD AUTO: 9 %
NEUTROPHILS # BLD AUTO: 2.6 X10E3/UL (ref 1.4–7)
NEUTROPHILS NFR BLD AUTO: 49 %
PLATELET # BLD AUTO: 125 X10E3/UL (ref 150–450)
POTASSIUM SERPL-SCNC: 4 MMOL/L (ref 3.5–5.2)
PROT SERPL-MCNC: 6.5 G/DL (ref 6–8.5)
RBC # BLD AUTO: 4.28 X10E6/UL (ref 3.77–5.28)
SODIUM SERPL-SCNC: 139 MMOL/L (ref 134–144)
TRIGL SERPL-MCNC: 63 MG/DL (ref 0–149)
VIT B12 SERPL-MCNC: 728 PG/ML (ref 232–1245)
VLDLC SERPL CALC-MCNC: 13 MG/DL (ref 5–40)
WBC # BLD AUTO: 5.4 X10E3/UL (ref 3.4–10.8)

## 2021-04-21 ENCOUNTER — OFFICE VISIT (OUTPATIENT)
Dept: CARDIOLOGY | Facility: CLINIC | Age: 76
End: 2021-04-21

## 2021-04-21 VITALS
WEIGHT: 105 LBS | HEART RATE: 56 BPM | BODY MASS INDEX: 17.93 KG/M2 | RESPIRATION RATE: 12 BRPM | OXYGEN SATURATION: 98 % | DIASTOLIC BLOOD PRESSURE: 74 MMHG | SYSTOLIC BLOOD PRESSURE: 118 MMHG | TEMPERATURE: 97.8 F | HEIGHT: 64 IN

## 2021-04-21 DIAGNOSIS — E78.5 HYPERLIPIDEMIA LDL GOAL <100: ICD-10-CM

## 2021-04-21 DIAGNOSIS — I10 ESSENTIAL HYPERTENSION: Primary | ICD-10-CM

## 2021-04-21 PROCEDURE — 93000 ELECTROCARDIOGRAM COMPLETE: CPT | Performed by: INTERNAL MEDICINE

## 2021-04-21 PROCEDURE — 99214 OFFICE O/P EST MOD 30 MIN: CPT | Performed by: INTERNAL MEDICINE

## 2021-04-21 RX ORDER — FLUVASTATIN SODIUM 80 MG/1
80 TABLET, FILM COATED, EXTENDED RELEASE ORAL DAILY
Qty: 90 TABLET | Refills: 3 | Status: SHIPPED | OUTPATIENT
Start: 2021-04-21 | End: 2022-08-15

## 2021-04-21 RX ORDER — METOPROLOL SUCCINATE 25 MG/1
25 TABLET, EXTENDED RELEASE ORAL DAILY
Qty: 90 TABLET | Refills: 3 | Status: SHIPPED | OUTPATIENT
Start: 2021-04-21

## 2021-04-21 RX ORDER — LISINOPRIL 20 MG/1
20 TABLET ORAL DAILY
Qty: 90 TABLET | Refills: 3 | Status: SHIPPED | OUTPATIENT
Start: 2021-04-21

## 2021-04-21 NOTE — PROGRESS NOTES
"[facility]  [facility] CARDIOLOGY  [address] [place] [address]  Dept: [phone]  Dept Fax: [phone]    Holly Davis  1945    Follow Up Office Visit Note    History of Present Illness:  Holly Davis is a 75 y.o. female who presents to the clinic for Follow-up. SVT- She is asymptomatic, denies any chest pain, palpitations, EKG sinus rhythm, , on Toprol xl 25 mg, she has been asymptomatic for long time     The following portions of the patient's history were reviewed and updated as appropriate: allergies, current medications, past family history, past medical history, past social history, past surgical history and problem list.    Medications:  Calcium-Magnesium-Vitamin D ER tablet sustained-release 24 hour  finasteride  fluvastatin XL  lisinopril  metoprolol succinate XL  PROBIOTIC DAILY PO  Restasis emulsion  sennosides-docusate  temazepam  traZODone  Vitamin D (Cholecalciferol) capsule    Subjective  Allergies   Allergen Reactions   • Cefdinir Other (See Comments)     \"OMNICEF (CEFDINIR) GAVE ME C-DIFF-SEVERAL YEARS AGO\"   • Midazolam Other (See Comments)     \"LAST TIME TOOK VERSED,COULD NOT BREATHE,WAS GIVEN REVERSAL DRUG\"   • Other Hives     OMNI IV   • Sulfa Antibiotics Rash   • Trimethoprim Unknown (See Comments) and Rash        Past Medical History:   Diagnosis Date   • Bursitis     left shoulder   • Menopause    • Monoclonal gammopathy    • Osteoporosis    • SI (sacroiliac) pain    • Sinus tachycardia    • Stress fracture of ankle     RIGHT ANKLE   • Stress fracture of foot     LEFT FOOT       Past Surgical History:   Procedure Laterality Date   • APPENDECTOMY     • BACK SURGERY      minimally invasive lumbar decompression   • BREAST CYST ASPIRATION Left    • BREAST EXCISIONAL BIOPSY Left 1980   • CATARACT EXTRACTION W/ INTRAOCULAR LENS  IMPLANT, BILATERAL     • COLONOSCOPY     • ENDOSCOPY N/A 12/23/2019    Procedure: ESOPHAGOGASTRODUODENOSCOPY WITH " DILATATION;  Surgeon: Brunner, Mark I, MD;  Location:  [facility];  Service: Gastroenterology   • ENDOSCOPY N/A 1/28/2020    Procedure: ESOPHAGOGASTRODUODENOSCOPY WITH DILITATION;  Surgeon: Brunner, Mark I, MD;  Location:  [facility];  Service: Gastroenterology   • ENDOSCOPY WITH FOREIGN BODY REMOVAL N/A 12/1/2019    Procedure: ESOPHAGOGASTRODUODENOSCOPY WITH FOREIGN BODY REMOVAL;  Surgeon: Brunner, Mark I, MD;  Location:  [facility];  Service: Gastroenterology   • HYSTERECTOMY  1996   • KNEE ARTHROPLASTY, PARTIAL REPLACEMENT Right 06/02/2017   • KNEE ARTHROPLASTY, PARTIAL REPLACEMENT Left 04/16/2018   • KNEE CARTILAGE SURGERY     • OOPHORECTOMY Bilateral 1996   • REPLACEMENT TOTAL KNEE Left 04/2018       Family History   Problem Relation Age of Onset   • Breast cancer Maternal Aunt 64   • Breast cancer Maternal Cousin 65   • Breast cancer Paternal Cousin 60   • Ovarian cancer Neg Hx         Social History     Socioeconomic History   • Marital status:      Spouse name: Not on file   • Number of children: Not on file   • Years of education: Not on file   • Highest education level: Not on file   Tobacco Use   • Smoking status: Never Smoker   • Smokeless tobacco: Never Used   Substance and Sexual Activity   • Alcohol use: Yes     Comment: social   • Drug use: No   • Sexual activity: Yes     Partners: Male     Birth control/protection: Surgical, Post-menopausal       Review of Systems   Constitutional: Negative.    HENT: Negative.    Respiratory: Negative.    Cardiovascular: Negative.    Endocrine: Negative.    Genitourinary: Negative.    Musculoskeletal: Negative.    Skin: Negative.    Allergic/Immunologic: Negative.    Neurological: Negative.    Hematological: Negative.    Psychiatric/Behavioral: Negative.    All other systems reviewed and are negative.      Cardiovascular Procedures    ECHO/MUGA:   STRESS TESTS:   CARDIAC CATH:   DEVICES:   HOLTER:   CT/MRI:   VASCULAR:   CARDIOTHORACIC:  "    Objective  Vitals:    04/21/21 1327   BP: 118/74   BP Location: Left arm   Patient Position: Lying   Cuff Size: Adult   Pulse: 56   Resp: 12   Temp: 97.8 °F (36.6 °C)   TempSrc: Infrared   SpO2: 98%   Weight: 47.6 kg (105 lb)   Height: 162.6 cm (64\")   PainSc: 0-No pain     Body mass index is 18.02 kg/m².     Physical Exam  Constitutional:       Appearance: Healthy appearance. Not in distress.   Neck:      Vascular: No JVR. JVD normal.   Pulmonary:      Effort: Pulmonary effort is normal.      Breath sounds: Normal breath sounds. No wheezing. No rhonchi. No rales.   Chest:      Chest wall: Not tender to palpatation.   Cardiovascular:      PMI at left midclavicular line. Normal rate. Regular rhythm. Normal S1. Normal S2.      Murmurs: There is no murmur.      No gallop. No click. No rub.   Pulses:     Intact distal pulses.   Edema:     Peripheral edema absent.   Abdominal:      General: Bowel sounds are normal.      Palpations: Abdomen is soft.      Tenderness: There is no abdominal tenderness.   Musculoskeletal: Normal range of motion.         General: No tenderness. Skin:     General: Skin is warm and dry.   Neurological:      General: No focal deficit present.      Mental Status: Alert and oriented to person, place and time.          Diagnostic Data    ECG 12 Lead    Date/Time: 4/21/2021 2:04 PM  Performed by: Justin Tidwell MD  Authorized by: Justin Tdiwell MD   Comparison: compared with previous ECG   Similar to previous ECG  Rhythm: sinus rhythm  Rate: normal  BPM: 57  QRS axis: left    Clinical impression: abnormal EKG            Assessment and Plan  Diagnoses and all orders for this visit:    Essential hypertension- The BP is fine on lisinopril 20 mg and Toprol xl 25mg    Hyperlipidemia LDL goal <100- on lescol xl 80 mg  SVT - Asymptomatic on Toprol xl 25 mg    Other orders  -     metoprolol succinate XL (TOPROL-XL) 25 MG 24 hr tablet; Take 1 tablet by mouth Daily.  -     lisinopril " (PRINIVIL,ZESTRIL) 20 MG tablet; Take 1 tablet by mouth Daily.  -     fluvastatin XL (LESCOL XL) 80 MG 24 hr tablet; Take 1 tablet by mouth Daily.         Return in about 1 year (around 4/21/2022) for Recheck.    Justin Tidwell MD  04/21/2021

## 2021-09-22 ENCOUNTER — TRANSCRIBE ORDERS (OUTPATIENT)
Dept: ADMINISTRATIVE | Facility: HOSPITAL | Age: 76
End: 2021-09-22

## 2021-09-22 DIAGNOSIS — Z12.31 VISIT FOR SCREENING MAMMOGRAM: Primary | ICD-10-CM

## 2021-10-29 ENCOUNTER — HOSPITAL ENCOUNTER (OUTPATIENT)
Dept: MAMMOGRAPHY | Facility: HOSPITAL | Age: 76
Discharge: HOME | End: 2021-10-29
Admitting: OBSTETRICS & GYNECOLOGY

## 2021-10-29 DIAGNOSIS — Z12.31 VISIT FOR SCREENING MAMMOGRAM: ICD-10-CM

## 2021-10-29 PROCEDURE — 77067 SCR MAMMO BI INCL CAD: CPT

## 2021-10-29 PROCEDURE — 77063 BREAST TOMOSYNTHESIS BI: CPT | Performed by: RADIOLOGY

## 2021-10-29 PROCEDURE — 77067 SCR MAMMO BI INCL CAD: CPT | Performed by: RADIOLOGY

## 2021-10-29 PROCEDURE — 77063 BREAST TOMOSYNTHESIS BI: CPT

## 2021-10-31 PROBLEM — R53.83 OTHER MALAISE AND FATIGUE: Status: RESOLVED | Noted: 2017-08-17 | Resolved: 2021-10-31

## 2021-10-31 PROBLEM — R53.81 OTHER MALAISE AND FATIGUE: Status: RESOLVED | Noted: 2017-08-17 | Resolved: 2021-10-31

## 2021-10-31 PROBLEM — Z01.419 WELL WOMAN EXAM: Status: ACTIVE | Noted: 2021-10-31

## 2021-10-31 PROBLEM — R13.19 ESOPHAGEAL DYSPHAGIA: Status: RESOLVED | Noted: 2019-12-05 | Resolved: 2021-10-31

## 2021-11-04 ENCOUNTER — OFFICE VISIT (OUTPATIENT)
Dept: OBSTETRICS AND GYNECOLOGY | Facility: CLINIC | Age: 76
End: 2021-11-04

## 2021-11-04 VITALS
BODY MASS INDEX: 19.05 KG/M2 | WEIGHT: 111 LBS | DIASTOLIC BLOOD PRESSURE: 80 MMHG | SYSTOLIC BLOOD PRESSURE: 122 MMHG | RESPIRATION RATE: 14 BRPM

## 2021-11-04 DIAGNOSIS — M85.89 OSTEOPENIA OF MULTIPLE SITES: Primary | ICD-10-CM

## 2021-11-04 DIAGNOSIS — Z71.85 VACCINE COUNSELING: ICD-10-CM

## 2021-11-04 PROBLEM — E56.9 VITAMIN DEFICIENCY: Status: RESOLVED | Noted: 2019-12-23 | Resolved: 2021-11-04

## 2021-11-04 PROBLEM — F43.89 OTHER SPECIFIED ADJUSTMENT REACTION: Status: RESOLVED | Noted: 2019-12-23 | Resolved: 2021-11-04

## 2021-11-04 PROCEDURE — 99214 OFFICE O/P EST MOD 30 MIN: CPT | Performed by: OBSTETRICS & GYNECOLOGY

## 2021-11-04 NOTE — PATIENT INSTRUCTIONS
Zoster Vaccine, Recombinant injection (Shingrix)      What is this medicine?  ZOSTER VACCINE (ZOS ter vak SEEN) is used to prevent shingles in adults 50 years old and over. This vaccine is not used to treat shingles or nerve pain from shingles.  This medicine may be used for other purposes; ask your health care provider or pharmacist if you have questions.    What should I tell my health care provider before I take this medicine?  They need to know if you have any of these conditions:  • blood disorders or disease  • cancer like leukemia or lymphoma  • immune system problems or therapy  • an unusual or allergic reaction to vaccines, other medications, foods, dyes, or preservatives  • pregnant or trying to get pregnant  • breast-feeding    How should I use this medicine?  1. This vaccine is for injection in a muscle. It is given by a health care professional.  2. The vaccine series requires 2 doses for full effect  3. The second dose should be given somewhere between 2-6 months after the initial injection is given.    What if I miss a dose?  • Keep appointments for follow-up (booster) doses as directed. It is important not to miss your dose.   • Call your doctor or health care professional if you are unable to keep an appointment.    What may interact with this medicine?  • medicines that suppress your immune system  • medicines to treat cancer  • steroid medicines like prednisone or cortisone    This list may not describe all possible interactions. Give your health care provider a list of all the medicines, herbs, non-prescription drugs, or dietary supplements you use. Also tell them if you smoke, drink alcohol, or use illegal drugs. Some items may interact with your medicine.    What should I watch for while using this medicine?  • Visit your doctor for regular check ups.  • This vaccine, like all vaccines, may not fully protect everyone.    What side effects may I notice from receiving this medicine?  Side effects  that you should report to your doctor or health care professional as soon as possible:  • allergic reactions like skin rash, itching or hives, swelling of the face, lips, or tongue  • breathing problems  • Side effects that usually do not require medical attention (report these to your doctor or health care professional if they continue or are bothersome):  • chills  • headache  • fever  • nausea, vomiting  • redness, warmth, pain, swelling or itching at site where injected  • tiredness  This list may not describe all possible side effects. Call your doctor for medical advice about side effects. You may report side effects to FDA at 1-878-NFC-1441.    Where should I keep my medicine?  This vaccine is only given in a clinic, pharmacy, doctor's office, or other health care setting and will not be stored at home.  NOTE: This sheet is a summary. It may not cover all possible information. If you have questions about this medicine, talk to your doctor, pharmacist, or health care provider.  © 2019 Elsevier/Gold Standard (2018-07-30 13:20:30)         Pneumococcal Polysaccharide Vaccine (PPSV23): What You Need to Know      1. Why get vaccinated?  Pneumococcal polysaccharide vaccine (PPSV23) can prevent pneumococcal disease.  Pneumococcal disease refers to any illness caused by pneumococcal bacteria. These bacteria can cause many types of illnesses, including pneumonia, which is an infection of the lungs. Pneumococcal bacteria are one of the most common causes of pneumonia.  Besides pneumonia, pneumococcal bacteria can also cause:  · Ear infections  · Sinus infections  · Meningitis (infection of the tissue covering the brain and spinal cord)  · Bacteremia (bloodstream infection)  Anyone can get pneumococcal disease, but children under 2 years of age, people with certain medical conditions, adults 65 years or older, and cigarette smokers are at the highest risk.  Most pneumococcal infections are mild. However, some can result  in long-term problems, such as brain damage or hearing loss. Meningitis, bacteremia, and pneumonia caused by pneumococcal disease can be fatal.  2. PPSV23  PPSV23 protects against 23 types of bacteria that cause pneumococcal disease.  PPSV23 is recommended for:  · All adults 65 years or older,  · Anyone 2 years or older with certain medical conditions that can lead to an increased risk for pneumococcal disease.  Most people need only one dose of PPSV23. A second dose of PPSV23, and another type of pneumococcal vaccine called PCV13, are recommended for certain high-risk groups. Your health care provider can give you more information.  People 65 years or older should get a dose of PPSV23 even if they have already gotten one or more doses of the vaccine before they turned 65.  3. Talk with your health care provider  Tell your vaccine provider if the person getting the vaccine:  · Has had an allergic reaction after a previous dose of PPSV23, or has any severe, life-threatening allergies.  In some cases, your health care provider may decide to postpone PPSV23 vaccination to a future visit.  People with minor illnesses, such as a cold, may be vaccinated. People who are moderately or severely ill should usually wait until they recover before getting PPSV23.  Your health care provider can give you more information.  4. Risks of a vaccine reaction  Redness or pain where the shot is given, feeling tired, fever, or muscle aches can happen after PPSV23.  People sometimes faint after medical procedures, including vaccination. Tell your provider if you feel dizzy or have vision changes or ringing in the ears.  As with any medicine, there is a very remote chance of a vaccine causing a severe allergic reaction, other serious injury, or death.  5. What if there is a serious problem?  An allergic reaction could occur after the vaccinated person leaves the clinic. If you see signs of a severe allergic reaction (hives, swelling of the  face and throat, difficulty breathing, a fast heartbeat, dizziness, or weakness), call 9-1-1 and get the person to the nearest hospital.  For other signs that concern you, call your health care provider.  Adverse reactions should be reported to the Vaccine Adverse Event Reporting System (VAERS). Your health care provider will usually file this report, or you can do it yourself. Visit the VAERS website at www.vaers.[facility].gov or call 1-724.428.8854. VAERS is only for reporting reactions, and VAERS staff do not give medical advice.  6. How can I learn more?  Ask your health care provider.  Call your local or state health department.  Contact the Centers for Disease Control and Prevention (CDC):  ? Call 1-271.274.6122 (7-958-GTG-INFO) or  ? Visit CDC's website at www.cdc.gov/vaccines    CDC Vaccine Information Statement PPSV23 Vaccine (10/30/2019)  This information is not intended to replace advice given to you by your health care provider. Make sure you discuss any questions you have with your health care provider.  Document Released: 10/15/2007 Document Revised: 04/07/2020 Document Reviewed: 07/30/2019  Elsevier Patient Education © 2020 Elsevier Inc.

## 2021-11-04 NOTE — PROGRESS NOTES
Subjective   Chief Complaint   Patient presents with   • Gynecologic Exam     Holly Davis is a 75 y.o. year old  Medicare patient presenting to be seen in follow up regarding bone loss.  She is a former patient of Dr. Lentz's and before that Dr. Page.  She has been treated intermittently for issues related to bone loss but is never taken anything for an extended period of time.  Her last bone density appears to have been in 2018.  At that point her T score had improved as compared to the one 2 years prior.    This past year she has not been on hormone replacement therapy.  She has not had any vaginal bleeding in the last 12 months.   Menopausal symptoms are not present.    SEXUAL Hx:  She is currently sexually active.  In the past year there there has been NO new sexual partners.    Condoms are never used.  She would not like to be screened for STD's at today's exam.  [place] is painful: no.  She has been on estrogen creams for many years but is never tried off of these creams.  She has no complaints preceding the initiation of creams  HEALTH Hx:  She exercises regularly: no (but is planning to start exercising more ).  She wears her seat belt: yes.  She has concerns about domestic violence: no.  She has noticed changes in height: no  OTHER THINGS SHE WANTS TO DISCUSS TODAY:  Nothing else    The following portions of the patient's history were reviewed and updated as appropriate:problem list, current medications, allergies, past family history, past medical history, past social history and past surgical history.    Social History    Tobacco Use      Smoking status: Never Smoker      Smokeless tobacco: Never Used    Review of Systems  Constitutional POS: nothing reported    NEG: anorexia or night sweats   Genitourinary POS: nothing reported    NEG: dysuria or hematuria   Gastointestinal POS: nothing reported    NEG: bloating, change in bowel habits, melena or reflux symptoms   Integument POS:  nothing reported    NEG: moles that are changing in size, shape, color or rashes   Breast POS: nothing reported    NEG: persistent breast lump, skin dimpling or nipple discharge        Objective   /80   Resp 14   Wt 50.3 kg (111 lb)   LMP  (LMP Unknown)   Breastfeeding No   BMI 19.05 kg/m²     General:  well developed; well nourished  no acute distress   Skin:  No suspicious lesions seen   Thyroid: normal to inspection and palpation   Breasts:  Examined in supine position  Symmetric without masses or skin dimpling  Nipples normal without inversion, lesions or discharge  There are no palpable axillary nodes   Abdomen: soft, non-tender; no masses  no umbilical or inguinal hernias are present  no hepato-splenomegaly   Pelvis: Clinical staff was present for exam  External genitalia:  normal appearance of the external genitalia including Bartholin's and [place] Ranchos's glands.  :  urethral meatus normal;  Vaginal:  normal pink mucosa without prolapse or lesions.  Cervix:  absent.  Uterus:  absent.  Adnexa:  absent, bilateral.  Rectal:  digital rectal exam not performed; anus visually normal appearing.        Assessment   1. Normal GYN exam S/P JANETTE w/ BSO  2. Osteopenia currently untreated.  Follow-up DEXA is due  3. H/o MGUS - ASHA  4. Recent BI-RADS 0 mammogram with follow-up already arranged  5. Menopausal female currently not on HRT - without significant symptoms affecting activities of daily living  6. She is up to date on all relevant gynecologic and colorectal screenings     Plan   1. Pap was not done today.  I explained to Holly that the Pap smears are no longer recommended in patient's after hysterectomy.   I stressed to Holly that she still should be seen to be seen yearly for a full physical including breast and pelvic exam.  2. She was encouraged to get mammograms at the interval determined by the mammography center until cleared for annual mammogrpahy.  She should report any palpable breast lump(s) or  skin changes regardless of mammographic findings.  I explained to Holly that notification regarding her mammogram results will come from the center performing the study.  Our office will not be routinely calling with mammogram results.  It is her responsibility to make sure that the results from the mammogram are communicated to her by the breast center.  If she has any questions about the results, she is welcome to call our office anytime.  3. Bone density testing was recommended.  I reviewed with Holly that it was always most advisable for all bone density tests for each patient to be done on the same machine over time.  The purpose of this is to improve the accuracy of the interpretation of serial studies.  4. I have encouraged her to reach out to her primary care or Dr. Love to find out why if her last colonoscopy in 2016 was normal and she has no family history, why he is recommending a colonoscopy to 5-year interval and not a 10-year interval  5. As relates to the vaginal estrogen creams my advice for now is that unless she is bothered by painful intercourse off the use of creams not relieved with over-the-counter lubricants I think she could probably suffice without the use of estrogen cream  6. I discussed with Holly that she may be behind on needed vaccinations for Influenza, Shingles [Shingrix] and Pneumoccal (PPSV23 only).  She may be able to obtain these vaccinations at her local pharmacy OR speak about obtaining them with her primary care.  If she does obtain her vaccines, I have asked Holly to let us know the date each vaccine was obtained so that her medical record could be updated in our system.  7. The importance of keeping all planned follow-up and taking all medications as prescribed was emphasized.  8. Follow up for annual exam 1 year         This note was electronically signed.    Adama Escobar M.D.  November 4, 2021    Note: Speech recognition transcription software may have been  used to create portions of this document.  An attempt at proofreading has been made but errors in transcription could still be present.

## 2021-11-11 ENCOUNTER — HOSPITAL ENCOUNTER (OUTPATIENT)
Dept: BONE DENSITY | Facility: HOSPITAL | Age: 76
Discharge: HOME | End: 2021-11-11
Admitting: OBSTETRICS & GYNECOLOGY

## 2021-11-11 ENCOUNTER — TELEPHONE (OUTPATIENT)
Dept: ONCOLOGY | Facility: CLINIC | Age: 76
End: 2021-11-11

## 2021-11-11 DIAGNOSIS — M85.89 OSTEOPENIA OF MULTIPLE SITES: ICD-10-CM

## 2021-11-11 DIAGNOSIS — R94.5 ABNORMAL RESULTS OF LIVER FUNCTION STUDIES: ICD-10-CM

## 2021-11-11 DIAGNOSIS — D47.2 MONOCLONAL GAMMOPATHY: Primary | ICD-10-CM

## 2021-11-11 PROCEDURE — 77080 DXA BONE DENSITY AXIAL: CPT

## 2021-11-11 NOTE — TELEPHONE ENCOUNTER
I spoke to Dr Monreal and she does want labs for the appt.  I put in orders and left voicemail for patient that orders are in and to keep her appt with us.

## 2021-11-11 NOTE — TELEPHONE ENCOUNTER
LMOM that no labs are needed at this time per last office note. Should that change, we can draw labs in the office the day of her appt.  [id number]  ~Shell

## 2021-11-11 NOTE — TELEPHONE ENCOUNTER
PATIENT CALLED REQUESTING TO SPEAK TO SHELL. PER PATIENT, THIS IS NOT HOW THIS WORKS.  SHE EXPECTS TO HAVE LABS DRAWN PRIOR TO HER APPT.  IF NOT, THEN SHE HAS TWO CHOICES, ONE TO CANCEL APPT. OR TWO, TO HAVE ORDER FOR LABS.     DARON, PLEASE CONTACT PATIENT TO ADVISE.

## 2021-11-11 NOTE — TELEPHONE ENCOUNTER
Caller: Holly Davis    Relationship: Self    Best call back number: [phone]    What is the best time to reach you: ASAP    Who are you requesting to speak with (clinical staff, provider,  specific staff member): NURSE    Do you know the name of the person who called:     What was the call regarding: PT NEEDS ORDERS FOR BLOODWORK    Do you require a callback: YES

## 2021-11-15 ENCOUNTER — LAB (OUTPATIENT)
Dept: LAB | Facility: HOSPITAL | Age: 76
End: 2021-11-15

## 2021-11-15 ENCOUNTER — TELEPHONE (OUTPATIENT)
Dept: OBSTETRICS AND GYNECOLOGY | Facility: CLINIC | Age: 76
End: 2021-11-15

## 2021-11-15 DIAGNOSIS — R94.5 ABNORMAL RESULTS OF LIVER FUNCTION STUDIES: ICD-10-CM

## 2021-11-15 DIAGNOSIS — D47.2 MONOCLONAL GAMMOPATHY: ICD-10-CM

## 2021-11-15 LAB
ALBUMIN SERPL-MCNC: 4 G/DL (ref 3.5–5.2)
ALBUMIN/GLOB SERPL: 1.5 G/DL
ALP SERPL-CCNC: 95 U/L (ref 39–117)
ALT SERPL W P-5'-P-CCNC: 14 U/L (ref 1–33)
ANION GAP SERPL CALCULATED.3IONS-SCNC: 8 MMOL/L (ref 5–15)
AST SERPL-CCNC: 15 U/L (ref 1–32)
BASOPHILS # BLD AUTO: 0.03 10*3/MM3 (ref 0–0.2)
BASOPHILS NFR BLD AUTO: 0.5 % (ref 0–1.5)
BILIRUB SERPL-MCNC: 0.5 MG/DL (ref 0–1.2)
BUN SERPL-MCNC: 21 MG/DL (ref 8–23)
BUN/CREAT SERPL: 23.1 (ref 7–25)
CALCIUM SPEC-SCNC: 9.3 MG/DL (ref 8.6–10.5)
CHLORIDE SERPL-SCNC: 104 MMOL/L (ref 98–107)
CO2 SERPL-SCNC: 27 MMOL/L (ref 22–29)
CREAT SERPL-MCNC: 0.91 MG/DL (ref 0.57–1)
DEPRECATED RDW RBC AUTO: 51.4 FL (ref 37–54)
EOSINOPHIL # BLD AUTO: 0.1 10*3/MM3 (ref 0–0.4)
EOSINOPHIL NFR BLD AUTO: 1.7 % (ref 0.3–6.2)
ERYTHROCYTE [DISTWIDTH] IN BLOOD BY AUTOMATED COUNT: 15.8 % (ref 12.3–15.4)
GFR SERPL CREATININE-BSD FRML MDRD: 60 ML/MIN/1.73
GLOBULIN UR ELPH-MCNC: 2.6 GM/DL
GLUCOSE SERPL-MCNC: 115 MG/DL (ref 65–99)
HCT VFR BLD AUTO: 38.2 % (ref 34–46.6)
HGB BLD-MCNC: 12.2 G/DL (ref 12–15.9)
IMM GRANULOCYTES # BLD AUTO: 0.02 10*3/MM3 (ref 0–0.05)
IMM GRANULOCYTES NFR BLD AUTO: 0.3 % (ref 0–0.5)
LYMPHOCYTES # BLD AUTO: 2.23 10*3/MM3 (ref 0.7–3.1)
LYMPHOCYTES NFR BLD AUTO: 38.6 % (ref 19.6–45.3)
MCH RBC QN AUTO: 28.5 PG (ref 26.6–33)
MCHC RBC AUTO-ENTMCNC: 31.9 G/DL (ref 31.5–35.7)
MCV RBC AUTO: 89.3 FL (ref 79–97)
MONOCYTES # BLD AUTO: 0.44 10*3/MM3 (ref 0.1–0.9)
MONOCYTES NFR BLD AUTO: 7.6 % (ref 5–12)
NEUTROPHILS NFR BLD AUTO: 2.96 10*3/MM3 (ref 1.7–7)
NEUTROPHILS NFR BLD AUTO: 51.3 % (ref 42.7–76)
NRBC BLD AUTO-RTO: 0 /100 WBC (ref 0–0.2)
PLATELET # BLD AUTO: 145 10*3/MM3 (ref 140–450)
PMV BLD AUTO: 11.3 FL (ref 6–12)
POTASSIUM SERPL-SCNC: 4.2 MMOL/L (ref 3.5–5.2)
PROT SERPL-MCNC: 6.6 G/DL (ref 6–8.5)
RBC # BLD AUTO: 4.28 10*6/MM3 (ref 3.77–5.28)
SODIUM SERPL-SCNC: 139 MMOL/L (ref 136–145)
WBC # BLD AUTO: 5.78 10*3/MM3 (ref 3.4–10.8)

## 2021-11-15 PROCEDURE — 86334 IMMUNOFIX E-PHORESIS SERUM: CPT

## 2021-11-15 PROCEDURE — 85025 COMPLETE CBC W/AUTO DIFF WBC: CPT

## 2021-11-15 PROCEDURE — 36415 COLL VENOUS BLD VENIPUNCTURE: CPT

## 2021-11-15 PROCEDURE — 82784 ASSAY IGA/IGD/IGG/IGM EACH: CPT

## 2021-11-15 PROCEDURE — 80053 COMPREHEN METABOLIC PANEL: CPT

## 2021-11-15 PROCEDURE — 83883 ASSAY NEPHELOMETRY NOT SPEC: CPT

## 2021-11-15 PROCEDURE — 84165 PROTEIN E-PHORESIS SERUM: CPT

## 2021-11-15 RX ORDER — RALOXIFENE HYDROCHLORIDE 60 MG/1
60 TABLET, FILM COATED ORAL DAILY
Qty: 90 TABLET | Refills: 4 | Status: SHIPPED | OUTPATIENT
Start: 2021-11-15 | End: 2021-11-16 | Stop reason: SDUPTHER

## 2021-11-15 NOTE — TELEPHONE ENCOUNTER
----- Message from Sammie Whitfield RN sent at 11/15/2021  2:29 PM EST -----  Advised pt of results and Dr Escobar recommendations. Pt states Fosamax didn't work previously and she was on Evista before. She is willing to resume treatment with Evista if Dr Escobar agrees.

## 2021-11-15 NOTE — TELEPHONE ENCOUNTER
Sounds good.  Sending a prescription to her [facility] pharmacy.    New Medications Ordered This Visit   Medications   • raloxifene (Evista) 60 MG tablet     Sig: Take 1 tablet by mouth Daily.     Dispense:  90 tablet     Refill:  4

## 2021-11-16 RX ORDER — RALOXIFENE HYDROCHLORIDE 60 MG/1
60 TABLET, FILM COATED ORAL DAILY
Qty: 90 TABLET | Refills: 4 | Status: SHIPPED | OUTPATIENT
Start: 2021-11-16 | End: 2022-09-22

## 2021-11-16 NOTE — TELEPHONE ENCOUNTER
Spoke with patient and advised her that we re-sent the prescription for her Evista to her [facility] pharmacy in [place].  Patient verbalized understanding and had no questions at this time.

## 2021-11-16 NOTE — TELEPHONE ENCOUNTER
PT called back, her pharmacy had not received the script.  Per the note it had been sent to the [facility] mail order instead of the pharmacy in [place].  Can it be resent to the correct pharmacy?

## 2021-11-16 NOTE — TELEPHONE ENCOUNTER
[phone] spoke with patient and informed her that Dr. Escobar sent in a Rx for Evista. Patient verbalized understanding.    no

## 2021-11-17 ENCOUNTER — OFFICE VISIT (OUTPATIENT)
Dept: ONCOLOGY | Facility: CLINIC | Age: 76
End: 2021-11-17

## 2021-11-17 VITALS
SYSTOLIC BLOOD PRESSURE: 126 MMHG | HEART RATE: 98 BPM | WEIGHT: 114 LBS | HEIGHT: 64 IN | TEMPERATURE: 98.6 F | OXYGEN SATURATION: 93 % | DIASTOLIC BLOOD PRESSURE: 69 MMHG | BODY MASS INDEX: 19.46 KG/M2

## 2021-11-17 DIAGNOSIS — D47.2 MONOCLONAL GAMMOPATHY: Primary | ICD-10-CM

## 2021-11-17 LAB
ALBUMIN SERPL ELPH-MCNC: 3.5 G/DL (ref 2.9–4.4)
ALBUMIN/GLOB SERPL: 1.3 {RATIO} (ref 0.7–1.7)
ALPHA1 GLOB SERPL ELPH-MCNC: 0.2 G/DL (ref 0–0.4)
ALPHA2 GLOB SERPL ELPH-MCNC: 0.6 G/DL (ref 0.4–1)
B-GLOBULIN SERPL ELPH-MCNC: 0.8 G/DL (ref 0.7–1.3)
GAMMA GLOB SERPL ELPH-MCNC: 1 G/DL (ref 0.4–1.8)
GLOBULIN SER-MCNC: 2.7 G/DL (ref 2.2–3.9)
IGA SERPL-MCNC: 73 MG/DL (ref 64–422)
IGG SERPL-MCNC: 1041 MG/DL (ref 586–1602)
IGM SERPL-MCNC: 111 MG/DL (ref 26–217)
INTERPRETATION SERPL IEP-IMP: ABNORMAL
KAPPA LC FREE SER-MCNC: 15.2 MG/L (ref 3.3–19.4)
KAPPA LC FREE/LAMBDA FREE SER: 0.48 {RATIO} (ref 0.26–1.65)
LABORATORY COMMENT REPORT: ABNORMAL
LAMBDA LC FREE SERPL-MCNC: 32 MG/L (ref 5.7–26.3)
M PROTEIN SERPL ELPH-MCNC: 0.7 G/DL
PROT SERPL-MCNC: 6.2 G/DL (ref 6–8.5)

## 2021-11-17 PROCEDURE — 99213 OFFICE O/P EST LOW 20 MIN: CPT | Performed by: INTERNAL MEDICINE

## 2021-11-17 RX ORDER — DOXYCYCLINE HYCLATE 50 MG/1
50 CAPSULE ORAL DAILY
COMMUNITY
Start: 2021-11-12

## 2021-11-17 RX ORDER — OMEPRAZOLE 20 MG/1
20 CAPSULE, DELAYED RELEASE ORAL DAILY
COMMUNITY
End: 2022-06-20 | Stop reason: SDUPTHER

## 2021-11-17 NOTE — PROGRESS NOTES
"  PROBLEM LIST:  1. 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  2. osteoprosis  3. Tachycardia  4. hyperlipidemia    Subjective      Cc: mgus    HISTORY OF PRESENT ILLNESS:   Holly Davis returns for follow-up. She says she has been doing well. No changes in her health over the past year.        Objective      /69   Pulse 98   Temp 98.6 °F (37 °C)   Ht 162.6 cm (64\")   Wt 51.7 kg (114 lb)   LMP  (LMP Unknown)   SpO2 93%   BMI 19.57 kg/m²      Performance Status: 0    General: well appearing female in no acute distress  Neuro: alert and oriented  HEENT: sclera anicteric  Skin: no rashes, lesions, bruising, or petechiae  Psych: mood and affect appropriate      RECENT LABS:  Lab Results   Component Value Date    WBC 5.78 11/15/2021    HGB 12.2 11/15/2021    HCT 38.2 11/15/2021    MCV 89.3 11/15/2021     11/15/2021     Lab Results   Component Value Date    GLUCOSE 115 (H) 11/15/2021    BUN 21 11/15/2021    CREATININE 0.91 11/15/2021    EGFRIFNONA 60 (L) 11/15/2021    EGFRIFAFRI 95 04/19/2021    BCR 23.1 11/15/2021    K 4.2 11/15/2021    CO2 27.0 11/15/2021    CALCIUM 9.3 11/15/2021    PROTENTOTREF 6.5 04/19/2021    ALBUMIN 4.00 11/15/2021    LABIL2 1.7 04/19/2021    AST 15 11/15/2021    ALT 14 11/15/2021          Assessment/Plan   Holly Davis is a 75 y.o. 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 Her labs so far show no evidence of progression to myeloma. Her M spike is still pending and we will contact her with those results, but we discussed that even if it is slightly increased, there is no indication for intervention as long as there is no evidence of endorgan damage.    Follow-up in 1 year.                        Nazia Monreal MD  [facility]emat[facility]    11/17/2021          CC:          "

## 2021-11-18 ENCOUNTER — TELEPHONE (OUTPATIENT)
Dept: ONCOLOGY | Facility: CLINIC | Age: 76
End: 2021-11-18

## 2021-12-08 ENCOUNTER — HOSPITAL ENCOUNTER (OUTPATIENT)
Dept: ULTRASOUND IMAGING | Facility: HOSPITAL | Age: 76
Discharge: HOME | End: 2021-12-08

## 2021-12-08 ENCOUNTER — HOSPITAL ENCOUNTER (OUTPATIENT)
Dept: MAMMOGRAPHY | Facility: HOSPITAL | Age: 76
Discharge: HOME | End: 2021-12-08

## 2021-12-08 DIAGNOSIS — R92.8 ABNORMAL MAMMOGRAM: ICD-10-CM

## 2021-12-08 PROCEDURE — 77065 DX MAMMO INCL CAD UNI: CPT

## 2021-12-08 PROCEDURE — 76642 ULTRASOUND BREAST LIMITED: CPT | Performed by: RADIOLOGY

## 2021-12-08 PROCEDURE — G0279 TOMOSYNTHESIS, MAMMO: HCPCS

## 2021-12-08 PROCEDURE — 77065 DX MAMMO INCL CAD UNI: CPT | Performed by: RADIOLOGY

## 2021-12-08 PROCEDURE — 76642 ULTRASOUND BREAST LIMITED: CPT

## 2021-12-08 PROCEDURE — G0279 TOMOSYNTHESIS, MAMMO: HCPCS | Performed by: RADIOLOGY

## 2022-03-22 RX ORDER — TRAZODONE HYDROCHLORIDE 100 MG/1
TABLET ORAL
Qty: 90 TABLET | Refills: 1 | Status: SHIPPED | OUTPATIENT
Start: 2022-03-22 | End: 2022-04-01 | Stop reason: SDUPTHER

## 2022-04-01 RX ORDER — TRAZODONE HYDROCHLORIDE 100 MG/1
100 TABLET ORAL NIGHTLY
Qty: 90 TABLET | Refills: 1 | Status: SHIPPED | OUTPATIENT
Start: 2022-04-01 | End: 2022-09-27

## 2022-04-01 NOTE — TELEPHONE ENCOUNTER
RX refill Note:    Requested Prescriptions  Requested Prescriptions     Pending Prescriptions Disp Refills   • traZODone (DESYREL) 100 MG tablet 90 tablet 1        Last office visit with prescribing clinician :11/29/21 by Benitez  Patient needs appointment?Yes  Labs due?no    Pharmacy on file:?  Yes       Araseli Colbert MA   04/01/22 1:10 PM EDT

## 2022-05-14 ENCOUNTER — OFFICE VISIT (OUTPATIENT)
Dept: FAMILY MEDICINE CLINIC | Facility: CLINIC | Age: 77
End: 2022-05-14

## 2022-05-14 DIAGNOSIS — U07.1 COVID-19 VIRUS INFECTION: Primary | ICD-10-CM

## 2022-05-14 PROCEDURE — 99213 OFFICE O/P EST LOW 20 MIN: CPT | Performed by: PHYSICIAN ASSISTANT

## 2022-05-14 RX ORDER — DEXTROMETHORPHAN HYDROBROMIDE AND PROMETHAZINE HYDROCHLORIDE 15; 6.25 MG/5ML; MG/5ML
SOLUTION ORAL
Qty: 150 ML | Refills: 0 | Status: SHIPPED | OUTPATIENT
Start: 2022-05-14 | End: 2022-05-14 | Stop reason: SDUPTHER

## 2022-05-14 RX ORDER — DEXTROMETHORPHAN HYDROBROMIDE AND PROMETHAZINE HYDROCHLORIDE 15; 6.25 MG/5ML; MG/5ML
SOLUTION ORAL
Qty: 150 ML | Refills: 0 | Status: SHIPPED | OUTPATIENT
Start: 2022-05-14 | End: 2022-09-22

## 2022-05-14 RX ORDER — METHYLPREDNISOLONE 4 MG/1
TABLET ORAL
Qty: 1 EACH | Refills: 0 | Status: SHIPPED | OUTPATIENT
Start: 2022-05-14 | End: 2022-05-14 | Stop reason: SDUPTHER

## 2022-05-14 RX ORDER — METHYLPREDNISOLONE 4 MG/1
TABLET ORAL
Qty: 1 EACH | Refills: 0 | Status: SHIPPED | OUTPATIENT
Start: 2022-05-14 | End: 2022-09-22

## 2022-05-14 NOTE — PROGRESS NOTES
Chief Complaint  Covid-19 Home Monitoring Video Visit    Subjective          Holly Ott [place] presents to [facility]  History of Present Illness    Objective   Vital Signs:  There were no vitals taken for this visit.    BMI is within normal parameters. No follow-up required.      Physical Exam   Result Review :                 Assessment and Plan    Diagnoses and all orders for this visit:    1. COVID-19 virus infection (Primary)  Assessment & Plan:  Patient prescribed steroid taper and Promethazine DM.  Will submit paperwork for patient to have monoclonal antibody infusion.  Advised patient to increase fluids and movement she will take over-the-counter medication for headache and fever.  Call if any problems arise follow-up with ED if needed    Orders:  -     promethazine-dextromethorphan (PROMETHAZINE-DM) 6.25-15 MG/5ML solution; Take 5 mL by mouth nightly for cough caution sedation  Dispense: 150 mL; Refill: 0  -     methylPREDNISolone (MEDROL) 4 MG dose pack; Take as directed on package instructions.  With food  Dispense: 1 each; Refill: 0             Follow Up   No follow-ups on file.  Patient was given instructions and counseling regarding her condition or for health maintenance advice. Please see specific information pulled into the AVS if appropriate.

## 2022-05-14 NOTE — PROGRESS NOTES
Mode of Visit: Audio  Location of patient: home  You have chosen to receive care through a telehealth visit.  Does the patient consent to use a video/audio connection for your medical care today? Yes  The visit included audio only interaction. No technical issues occurred during this visit.     Chief Complaint  Covid-19 Home Monitoring Video Visit    Subjective          Holly Davis presents to [facility]  Patient reports waking 2 days ago with fever, chills, headache feeling weak and fatigue.  Patient states she took a home COVID test and was positive yesterday and this morning.  Patient reports she has had 4 COVID vaccines.  Patient reports she is taking over-the-counter medication for her headache and body aches.  Patient however reports they continue.  Patient denies any fever this morning.  Reports she has been drinking okay and has been attempting to increase fluids.  Patient denies any nausea or vomiting or diarrhea, denies any shortness of breath or chest pain.  Patient denies any difficulty breathing     Objective   Vital Signs:   There were no vitals taken for this visit.    Physical Exam   Pulmonary/Chest: Effort normal.  No respiratory distress.  Psychiatric: She has a normal mood and affect.     Result Review :                 Assessment and Plan    Diagnoses and all orders for this visit:    1. COVID-19 virus infection (Primary)  Assessment & Plan:  Patient prescribed steroid taper and Promethazine DM.  Will submit paperwork for patient to have monoclonal antibody infusion.  Advised patient to increase fluids and movement she will take over-the-counter medication for headache and fever.  Call if any problems arise follow-up with ED if needed    Orders:  -     Discontinue: promethazine-dextromethorphan (PROMETHAZINE-DM) 6.25-15 MG/5ML solution; Take 5 mL by mouth nightly for cough caution sedation  Dispense: 150 mL; Refill: 0  -     Discontinue: methylPREDNISolone  (MEDROL) 4 MG dose pack; Take as directed on package instructions.  With food  Dispense: 1 each; Refill: 0  -     promethazine-dextromethorphan (PROMETHAZINE-DM) 6.25-15 MG/5ML solution; Take 5 mL by mouth nightly for cough caution sedation  Dispense: 150 mL; Refill: 0  -     methylPREDNISolone (MEDROL) 4 MG dose pack; Take as directed on package instructions.  With food  Dispense: 1 each; Refill: 0      Follow Up   No follow-ups on file.  Patient was given instructions and counseling regarding her condition or for health maintenance advice. Please see specific information pulled into the AVS if appropriate.

## 2022-05-14 NOTE — ASSESSMENT & PLAN NOTE
Patient prescribed steroid taper and Promethazine DM.  Will submit paperwork for patient to have monoclonal antibody infusion.  Advised patient to increase fluids and movement she will take over-the-counter medication for headache and fever.  Call if any problems arise follow-up with ED if needed

## 2022-05-16 DIAGNOSIS — U07.1 COVID-19 VIRUS INFECTION: Primary | ICD-10-CM

## 2022-05-23 ENCOUNTER — TELEPHONE (OUTPATIENT)
Dept: FAMILY MEDICINE CLINIC | Facility: CLINIC | Age: 77
End: 2022-05-23

## 2022-06-20 RX ORDER — OMEPRAZOLE 20 MG/1
20 CAPSULE, DELAYED RELEASE ORAL DAILY
Qty: 90 CAPSULE | Refills: 0 | Status: SHIPPED | OUTPATIENT
Start: 2022-06-20 | End: 2022-08-22

## 2022-08-14 DIAGNOSIS — E78.5 HYPERLIPIDEMIA LDL GOAL <100: ICD-10-CM

## 2022-08-15 RX ORDER — FLUVASTATIN SODIUM 80 MG/1
TABLET, FILM COATED, EXTENDED RELEASE ORAL
Qty: 90 TABLET | Refills: 3 | Status: SHIPPED | OUTPATIENT
Start: 2022-08-15

## 2022-08-22 RX ORDER — OMEPRAZOLE 20 MG/1
CAPSULE, DELAYED RELEASE ORAL
Qty: 90 CAPSULE | Refills: 0 | Status: SHIPPED | OUTPATIENT
Start: 2022-08-22 | End: 2022-12-27

## 2022-09-20 ENCOUNTER — TRANSCRIBE ORDERS (OUTPATIENT)
Dept: ADMINISTRATIVE | Facility: HOSPITAL | Age: 77
End: 2022-09-20

## 2022-09-20 DIAGNOSIS — Z12.31 VISIT FOR SCREENING MAMMOGRAM: Primary | ICD-10-CM

## 2022-09-22 ENCOUNTER — OFFICE VISIT (OUTPATIENT)
Dept: OBSTETRICS AND GYNECOLOGY | Facility: CLINIC | Age: 77
End: 2022-09-22

## 2022-09-22 VITALS
DIASTOLIC BLOOD PRESSURE: 80 MMHG | RESPIRATION RATE: 14 BRPM | BODY MASS INDEX: 19.74 KG/M2 | WEIGHT: 115 LBS | SYSTOLIC BLOOD PRESSURE: 132 MMHG

## 2022-09-22 DIAGNOSIS — M81.0 AGE-RELATED OSTEOPOROSIS WITHOUT CURRENT PATHOLOGICAL FRACTURE: ICD-10-CM

## 2022-09-22 DIAGNOSIS — N64.4 BREAST PAIN, LEFT: Primary | ICD-10-CM

## 2022-09-22 PROCEDURE — 99213 OFFICE O/P EST LOW 20 MIN: CPT | Performed by: OBSTETRICS & GYNECOLOGY

## 2022-09-22 RX ORDER — ROMOSOZUMAB-AQQG 105 MG/1.17ML
210 INJECTION, SOLUTION SUBCUTANEOUS
Qty: 2.24 ML
Start: 2022-09-22

## 2022-09-22 NOTE — PROGRESS NOTES
Subjective   Chief Complaint   Patient presents with   • Breast Mass     Left breast     Holly Davis is a 76 y.o. year old  presenting to be seen because of a new spot in the left breast that she noticed less than a month ago.  She noticed it while doing self breast exam.  It is adjacent to a longstanding area of fibrocystic change.  It is at the 4:00 region.  It is small.  May be the size of a BB.  There are some tenderness in that region.  She feels it is freely mobile.  There is been no breast trauma in that area.  There is nothing she feels in the contralateral breast or anywhere else in the ipsilateral breast.  There is been no nipple discharge.  There is been no change in the symmetry of the breast.    · Last mammogram: was done on approximately 2021 and the result was: Birads II (Benign findings).  · Last breast MRI: she has never had a MRI    OTHER THINGS SHE WANTS TO DISCUSS TODAY:  She did have an MRI and some bone densities done while she was in [place].  That bone density did in fact show osteoporosis.  She had historically been on raloxifene.  Even though the bone density was on a different machine, it did seem worse.  They have discontinued the raloxifene and she is now on Evenity being prescribed by a physician in the local market.  She did have some lab work done as well showing a normal PTH and calcium level.  Vitamin D was also normal.    The following portions of the patient's history were reviewed and updated as appropriate:current medications and allergies    Social History    Tobacco Use      Smoking status: Never Smoker      Smokeless tobacco: Never Used    Review of Systems  Constitutional POS: nothing reported    NEG: anorexia or night sweats   Genitourinary POS: nothing reported    NEG: dysuria or hematuria   Gastointestinal POS: nothing reported    NEG: bloating, change in bowel habits, melena or reflux symptoms   Integument POS: nothing reported    NEG: moles that are  changing in size, shape, color or rashes   Breast POS: nothing reported    NEG: persistent breast lump, skin dimpling or nipple discharge        Objective   /80   Resp 14   Wt 52.2 kg (115 lb)   LMP  (LMP Unknown)   Breastfeeding No   BMI 19.74 kg/m²     General:  well developed; well nourished  no acute distress   Breasts:  Examined in supine position  Nipples normal without inversion, lesions or discharge  There are no palpable axillary nodes   Diffuse fibrocystic change are present but no focal masses     Lab Review   SPEP with MGUS    Imaging   DEXA  Mammogram report       Assessment   1. Left breast mass -not able to be objectively confirmed on today's exam  2. Osteoporosis currently on Evenity and followed by outside MD  3. MGUS followed with hematology/oncology     Plan   1. Set up diagnostic mammogram regarding left breast  2. The importance of keeping all planned follow-up and taking all medications as prescribed was emphasized.  3. Follow up for annual exam as previously scheduled     No orders of the defined types were placed in this encounter.         This note was electronically signed.    Adama Escobar M.D.  September 22, 2022    Part of this note may be an electronic transcription/translation of spoken language to printed text using the Dragon Dictation System.

## 2022-09-27 RX ORDER — TRAZODONE HYDROCHLORIDE 100 MG/1
TABLET ORAL
Qty: 90 TABLET | Refills: 0 | Status: SHIPPED | OUTPATIENT
Start: 2022-09-27 | End: 2023-01-26 | Stop reason: SDUPTHER

## 2022-10-21 ENCOUNTER — HOSPITAL ENCOUNTER (OUTPATIENT)
Dept: MAMMOGRAPHY | Facility: HOSPITAL | Age: 77
Discharge: HOME | End: 2022-10-21

## 2022-10-21 ENCOUNTER — HOSPITAL ENCOUNTER (OUTPATIENT)
Dept: ULTRASOUND IMAGING | Facility: HOSPITAL | Age: 77
Discharge: HOME | End: 2022-10-21

## 2022-10-21 DIAGNOSIS — N64.4 BREAST PAIN, LEFT: ICD-10-CM

## 2022-10-21 PROCEDURE — 77066 DX MAMMO INCL CAD BI: CPT

## 2022-10-21 PROCEDURE — 77066 DX MAMMO INCL CAD BI: CPT | Performed by: RADIOLOGY

## 2022-10-21 PROCEDURE — G0279 TOMOSYNTHESIS, MAMMO: HCPCS

## 2022-10-21 PROCEDURE — G0279 TOMOSYNTHESIS, MAMMO: HCPCS | Performed by: RADIOLOGY

## 2022-10-21 PROCEDURE — 76642 ULTRASOUND BREAST LIMITED: CPT

## 2022-10-21 PROCEDURE — 76642 ULTRASOUND BREAST LIMITED: CPT | Performed by: RADIOLOGY

## 2022-11-09 ENCOUNTER — LAB (OUTPATIENT)
Dept: LAB | Facility: HOSPITAL | Age: 77
End: 2022-11-09

## 2022-11-09 DIAGNOSIS — D47.2 MONOCLONAL GAMMOPATHY: ICD-10-CM

## 2022-11-09 LAB
ALBUMIN SERPL-MCNC: 4.1 G/DL (ref 3.5–5.2)
ALBUMIN/GLOB SERPL: 1.9 G/DL
ALP SERPL-CCNC: 112 U/L (ref 39–117)
ALT SERPL W P-5'-P-CCNC: 16 U/L (ref 1–33)
ANION GAP SERPL CALCULATED.3IONS-SCNC: 8 MMOL/L (ref 5–15)
AST SERPL-CCNC: 19 U/L (ref 1–32)
BASOPHILS # BLD AUTO: 0.03 10*3/MM3 (ref 0–0.2)
BASOPHILS NFR BLD AUTO: 0.7 % (ref 0–1.5)
BILIRUB SERPL-MCNC: 0.6 MG/DL (ref 0–1.2)
BUN SERPL-MCNC: 15 MG/DL (ref 8–23)
BUN/CREAT SERPL: 20.8 (ref 7–25)
CALCIUM SPEC-SCNC: 8.8 MG/DL (ref 8.6–10.5)
CHLORIDE SERPL-SCNC: 107 MMOL/L (ref 98–107)
CO2 SERPL-SCNC: 25 MMOL/L (ref 22–29)
CREAT SERPL-MCNC: 0.72 MG/DL (ref 0.57–1)
DEPRECATED RDW RBC AUTO: 55.8 FL (ref 37–54)
EGFRCR SERPLBLD CKD-EPI 2021: 85.7 ML/MIN/1.73
EOSINOPHIL # BLD AUTO: 0.05 10*3/MM3 (ref 0–0.4)
EOSINOPHIL NFR BLD AUTO: 1.1 % (ref 0.3–6.2)
ERYTHROCYTE [DISTWIDTH] IN BLOOD BY AUTOMATED COUNT: 16.6 % (ref 12.3–15.4)
GLOBULIN UR ELPH-MCNC: 2.2 GM/DL
GLUCOSE SERPL-MCNC: 90 MG/DL (ref 65–99)
HCT VFR BLD AUTO: 38.1 % (ref 34–46.6)
HGB BLD-MCNC: 11.8 G/DL (ref 12–15.9)
IMM GRANULOCYTES # BLD AUTO: 0.01 10*3/MM3 (ref 0–0.05)
IMM GRANULOCYTES NFR BLD AUTO: 0.2 % (ref 0–0.5)
LYMPHOCYTES # BLD AUTO: 2.02 10*3/MM3 (ref 0.7–3.1)
LYMPHOCYTES NFR BLD AUTO: 43.9 % (ref 19.6–45.3)
MCH RBC QN AUTO: 28.3 PG (ref 26.6–33)
MCHC RBC AUTO-ENTMCNC: 31 G/DL (ref 31.5–35.7)
MCV RBC AUTO: 91.4 FL (ref 79–97)
MONOCYTES # BLD AUTO: 0.41 10*3/MM3 (ref 0.1–0.9)
MONOCYTES NFR BLD AUTO: 8.9 % (ref 5–12)
NEUTROPHILS NFR BLD AUTO: 2.08 10*3/MM3 (ref 1.7–7)
NEUTROPHILS NFR BLD AUTO: 45.2 % (ref 42.7–76)
NRBC BLD AUTO-RTO: 0 /100 WBC (ref 0–0.2)
PLATELET # BLD AUTO: 140 10*3/MM3 (ref 140–450)
PMV BLD AUTO: 11.1 FL (ref 6–12)
POTASSIUM SERPL-SCNC: 4.6 MMOL/L (ref 3.5–5.2)
PROT SERPL-MCNC: 6.3 G/DL (ref 6–8.5)
RBC # BLD AUTO: 4.17 10*6/MM3 (ref 3.77–5.28)
SODIUM SERPL-SCNC: 140 MMOL/L (ref 136–145)
WBC NRBC COR # BLD: 4.6 10*3/MM3 (ref 3.4–10.8)

## 2022-11-09 PROCEDURE — 82784 ASSAY IGA/IGD/IGG/IGM EACH: CPT

## 2022-11-09 PROCEDURE — 86334 IMMUNOFIX E-PHORESIS SERUM: CPT

## 2022-11-09 PROCEDURE — 83521 IG LIGHT CHAINS FREE EACH: CPT

## 2022-11-09 PROCEDURE — 85025 COMPLETE CBC W/AUTO DIFF WBC: CPT

## 2022-11-09 PROCEDURE — 84165 PROTEIN E-PHORESIS SERUM: CPT

## 2022-11-09 PROCEDURE — 80053 COMPREHEN METABOLIC PANEL: CPT

## 2022-11-09 PROCEDURE — 36415 COLL VENOUS BLD VENIPUNCTURE: CPT

## 2022-11-11 LAB
ALBUMIN SERPL ELPH-MCNC: 3.5 G/DL (ref 2.9–4.4)
ALBUMIN/GLOB SERPL: 1.5 {RATIO} (ref 0.7–1.7)
ALPHA1 GLOB SERPL ELPH-MCNC: 0.2 G/DL (ref 0–0.4)
ALPHA2 GLOB SERPL ELPH-MCNC: 0.7 G/DL (ref 0.4–1)
B-GLOBULIN SERPL ELPH-MCNC: 0.8 G/DL (ref 0.7–1.3)
GAMMA GLOB SERPL ELPH-MCNC: 0.8 G/DL (ref 0.4–1.8)
GLOBULIN SER-MCNC: 2.5 G/DL (ref 2.2–3.9)
IGA SERPL-MCNC: 52 MG/DL (ref 64–422)
IGG SERPL-MCNC: 807 MG/DL (ref 586–1602)
IGM SERPL-MCNC: 91 MG/DL (ref 26–217)
INTERPRETATION SERPL IEP-IMP: ABNORMAL
LABORATORY COMMENT REPORT: ABNORMAL
M PROTEIN SERPL ELPH-MCNC: 0.5 G/DL
PROT SERPL-MCNC: 6 G/DL (ref 6–8.5)

## 2022-11-15 LAB
KAPPA LC FREE SER-MCNC: 14.5 MG/L (ref 3.3–19.4)
KAPPA LC FREE/LAMBDA FREE SER: 0.44 {RATIO} (ref 0.26–1.65)
LAMBDA LC FREE SERPL-MCNC: 33 MG/L (ref 5.7–26.3)

## 2022-11-16 ENCOUNTER — OFFICE VISIT (OUTPATIENT)
Dept: ONCOLOGY | Facility: CLINIC | Age: 77
End: 2022-11-16

## 2022-11-16 VITALS
HEART RATE: 55 BPM | DIASTOLIC BLOOD PRESSURE: 61 MMHG | SYSTOLIC BLOOD PRESSURE: 121 MMHG | BODY MASS INDEX: 18.95 KG/M2 | HEIGHT: 64 IN | RESPIRATION RATE: 16 BRPM | OXYGEN SATURATION: 97 % | TEMPERATURE: 98.1 F | WEIGHT: 111 LBS

## 2022-11-16 DIAGNOSIS — D47.2 MONOCLONAL GAMMOPATHY: Primary | ICD-10-CM

## 2022-11-16 PROCEDURE — 99213 OFFICE O/P EST LOW 20 MIN: CPT | Performed by: INTERNAL MEDICINE

## 2022-11-16 RX ORDER — CELECOXIB 100 MG/1
CAPSULE ORAL
COMMUNITY
Start: 2022-10-15

## 2022-11-16 NOTE — PROGRESS NOTES
"  PROBLEM LIST:  1. 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  2. osteoprosis  3. Tachycardia  4. hyperlipidemia    Subjective      Cc: mgus    HISTORY OF PRESENT ILLNESS:   Holly Davis returns for follow-up.  She is doing well.  No new health concerns.  She reports that she started a new medicine for her bone density.        Objective      /61   Pulse 55   Temp 98.1 °F (36.7 °C)   Resp 16   Ht 162.6 cm (64\")   Wt 50.3 kg (111 lb)   LMP  (LMP Unknown)   SpO2 97%   BMI 19.05 kg/m²      Performance Status: 0    General: well appearing female in no acute distress  Neuro: alert and oriented  HEENT: sclera anicteric  Skin: no rashes, lesions, bruising, or petechiae  Psych: mood and affect appropriate      RECENT LABS:  Lab Results   Component Value Date    WBC 4.60 11/09/2022    HGB 11.8 (L) 11/09/2022    HCT 38.1 11/09/2022    MCV 91.4 11/09/2022     11/09/2022     Lab Results   Component Value Date    GLUCOSE 90 11/09/2022    BUN 15 11/09/2022    CREATININE 0.72 11/09/2022    EGFRIFNONA 60 (L) 11/15/2021    EGFRIFAFRI 95 04/19/2021    BCR 20.8 11/09/2022    K 4.6 11/09/2022    CO2 25.0 11/09/2022    CALCIUM 8.8 11/09/2022    PROTENTOTREF 6.0 11/09/2022    ALBUMIN 4.10 11/09/2022    ALBUMIN 3.5 11/09/2022    LABIL2 1.5 11/09/2022    AST 19 11/09/2022    ALT 16 11/09/2022         Latest Reference Range & Units 11/09/22 11:10   Globulin 2.2 - 3.9 g/dL 2.5   Alpha-1-Globulin 0.0 - 0.4 g/dL 0.2   Alpha-2-Globulin 0.4 - 1.0 g/dL 0.7   Beta Globulin 0.7 - 1.3 g/dL 0.8   Gamma Globulin 0.4 - 1.8 g/dL 0.8   M-Albino Not Observed g/dL 0.5 (H)   Immunofixation Reflex, Serum  Comment !   (H): Data is abnormally high  !: Data is abnormal   Latest Reference Range & Units 11/09/22 11:10   Kappa FLC 3.3 - 19.4 mg/L 14.5   Free Lambda Light Chains 5.7 - 26.3 mg/L 33.0 (H)   Kappa/Lambda Ratio 0.26 - " 1.65  0.44   (H): Data is abnormally high    Assessment & Plan   Holly Ott Dairy is a 76 y.o. 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  Her M spike remained stable.  She has a very mildly elevated lambda light chain with a normal ratio.  She is low risk for progression to myeloma but we will continue to monitor once yearly.    Follow-up in 1 year.                        Nazia Monreal MD  [facility]    11/16/2022          CC:

## 2022-11-17 ENCOUNTER — TELEPHONE (OUTPATIENT)
Dept: FAMILY MEDICINE CLINIC | Facility: CLINIC | Age: 77
End: 2022-11-17

## 2022-11-17 NOTE — TELEPHONE ENCOUNTER
Caller: Holly Davis    Relationship: Self    Best call back number:   [phone]  Requested Prescriptions:   Requested Prescriptions      No prescriptions requested or ordered in this encounter        Pharmacy where request should be sent:        [facility] [id number] - [address] AT [facility] [address] - [phone]  - [phone] FX     Additional details provided by patient:     PATIENT CALLED AND SAID HER EAR, NOSE, THROAT DOCTOR SUGGESTED SHE STARTED TAKING ONE TWO CAPSULES BY MOUTH DAILY       Madalyn Eason Rep   11/17/22 09:13 EST

## 2022-12-12 ENCOUNTER — TRANSCRIBE ORDERS (OUTPATIENT)
Dept: ADMINISTRATIVE | Facility: HOSPITAL | Age: 77
End: 2022-12-12

## 2022-12-12 ENCOUNTER — APPOINTMENT (OUTPATIENT)
Dept: MAMMOGRAPHY | Facility: HOSPITAL | Age: 77
End: 2022-12-12

## 2022-12-12 DIAGNOSIS — R13.10 DYSPHAGIA, UNSPECIFIED TYPE: Primary | ICD-10-CM

## 2022-12-25 PROBLEM — U07.1 COVID-19 VIRUS INFECTION: Status: RESOLVED | Noted: 2022-05-14 | Resolved: 2022-12-25

## 2022-12-25 NOTE — PROGRESS NOTES
Subjective   Chief Complaint   Patient presents with   • Gynecologic Exam     Holly Daivs is a 77 y.o. year old  who is post-menopausal.  She is S/P hysterectomy presenting to be seen for her annual exam.  She has been having issues with abdominal discomfort for somewhere between 3 and 6 months.  It is mostly right lower quadrant.  She had a colonoscopy this morning.  This is a repeat after a recent colonoscopy.  Apparently the first bowel prep did not clean things out well enough.  This bowel prep reportedly was normal and everything looks fine other than a long redundant colon.  CAT scan has been ordered looking for other explanations for her abdominal pain.    She has chronic constipation for many years.  She takes over-the-counter medication to help with this.    This past year she has not been on hormone replacement therapy.  There has not been vaginal bleeding in the last 12 months.  Menopausal symptoms are not present.    SEXUAL Hx:  She is currently sexually active.  In the past year there there has been NO new sexual partners.    Condoms are never used.  She would not like to be screened for STD's at today's exam.  Terra Bella is painful: no  HEALTH Hx:  She exercises regularly: no (but is planning to start exercising more).  She wears her seat belt: yes.  She has concerns about domestic violence: no.  OTHER THINGS SHE WANTS TO DISCUSS TODAY:  Nothing else    The following portions of the patient's history were reviewed and updated as appropriate:problem list, current medications, allergies, past family history, past medical history, past social history and past surgical history.    Social History    Tobacco Use      Smoking status: Never      Smokeless tobacco: Never    Review of Systems  Constitutional POS: nothing reported    NEG: anorexia or night sweats   Genitourinary POS: nothing reported    NEG: dysuria or hematuria   Gastointestinal POS: see HPI    NEG: bloating, change in bowel  habits, melena or reflux symptoms   Integument POS: nothing reported    NEG: moles that are changing in size, shape, color or rashes   Breast POS: nothing reported    NEG: persistent breast lump, skin dimpling or nipple discharge        Objective   /68   Resp 14   Wt 49.4 kg (109 lb)   LMP  (LMP Unknown)   BMI 18.71 kg/m²     General:  well developed; well nourished  no acute distress   Skin:  No suspicious lesions seen   Thyroid: normal to inspection and palpation   Breasts:  Examined in supine position  Symmetric without masses or skin dimpling  Nipples normal without inversion, lesions or discharge  There are no palpable axillary nodes   Abdomen: soft, non-tender; no masses  no umbilical or inguinal hernias are present  no hepato-splenomegaly   Pelvis: Clinical staff was present for exam  External genitalia:  normal appearance of the external genitalia including Bartholin's and [place]'s glands.  :  urethral meatus normal;  Vaginal:  atrophic mucosal changes are present;  Cervix:  absent.  Uterus:  absent.  Adnexa:  absent, bilateral.  Rectal:  digital rectal exam not performed; anus visually normal appearing.        Assessment   1. Normal GYN exam S/P JANETTE w/ BSO  2. Osteoporosis currently on Evenity and followed by outside MD  3. MGUS followed with hematology/oncology - ASHA and followed by Rah  4. Right lower quadrant pain with reportedly recent normal colonoscopy.  Have high index of suspicion that this relates to her longstanding chronic constipation  5. Menopausal female currently not on HRT - without significant symptoms affecting activities of daily living     Plan   1. Pap was not done today.  I explained to Holly that the Pap smears are no longer recommended in patient's after hysterectomy.   I stressed to Holly that she still should be seen to be seen yearly for a full physical including breast and pelvic exam.  2. The following tests were ordered today: Varicella IgG.  It was explained to  Holly that all lab test should be back within the one week after they are performed. She will be notified about the results, regardless of the findings. If she has not been contacted by the office within 2 weeks after the test has been performed, it is her responsibility to contact us to learn about her results.  3. Regarding breast cancer screening by yearly mammogram in women aged 75 years of age and older, I reviewed the USPSTF's recommendations.  The USPSTF concludes that the current evidence is insufficient to assess the balance of benefits and harms of screening mammography in women aged 75 years or older.  After hearing this information she is interested in continuing with yearly mammograms.  Regardless of her decision to continue to participate in screening, she should report any palpable breast lump(s) or skin changes regardless of mammographic findings.  If she chooses to continue with breast cancer screening, I explained to Holly that notification regarding her mammogram results will come from the center performing the study.  Our office will not be routinely calling with mammogram results.  It is her responsibility to make sure that the results from the mammogram are communicated to her by the breast center.  If she has any questions about the results, she is welcome to call our office anytime.  4. My advice that she speak to her primary care about treatment options for chronic constipation with pain.  She may be a good candidate for Linzess.  I also would encourage her to diminish or eliminate constipating foods especially gluten while increasing water and increasing fruits and vegetables.  5. The following data needs to be obtained to update her medical records: last colonoscopy.  6. Her vaccine record was reviewed and updated.  7. I discussed with Holly that she may be behind on needed vaccinations for TDAP and Shingles [Shingrix].  She may be able to obtain these vaccinations at her local pharmacy OR  speak about obtaining them with her primary care.  If she does obtain her vaccines, I have asked Holly to let us know the date each vaccine was obtained so that her medical record could be updated in our system.  8. The importance of keeping all planned follow-up and taking all medications as prescribed was emphasized.  9. Follow up for annual exam 1 year         This note was electronically signed.    Adama Escobar M.D.  December 27, 2022    Part of this note may be an electronic transcription/translation of spoken language to printed text using the Dragon Dictation System.

## 2022-12-27 ENCOUNTER — OFFICE VISIT (OUTPATIENT)
Dept: OBSTETRICS AND GYNECOLOGY | Facility: CLINIC | Age: 77
End: 2022-12-27

## 2022-12-27 VITALS
SYSTOLIC BLOOD PRESSURE: 128 MMHG | WEIGHT: 109 LBS | DIASTOLIC BLOOD PRESSURE: 68 MMHG | BODY MASS INDEX: 18.71 KG/M2 | RESPIRATION RATE: 14 BRPM

## 2022-12-27 DIAGNOSIS — M81.0 AGE-RELATED OSTEOPOROSIS WITHOUT CURRENT PATHOLOGICAL FRACTURE: ICD-10-CM

## 2022-12-27 DIAGNOSIS — Z01.419 WELL WOMAN EXAM: Primary | ICD-10-CM

## 2022-12-27 DIAGNOSIS — Z71.85 VACCINE COUNSELING: ICD-10-CM

## 2022-12-27 DIAGNOSIS — K59.09 CHRONIC CONSTIPATION: ICD-10-CM

## 2022-12-27 PROCEDURE — G0101 CA SCREEN;PELVIC/BREAST EXAM: HCPCS | Performed by: OBSTETRICS & GYNECOLOGY

## 2022-12-27 RX ORDER — DEXLANSOPRAZOLE 60 MG/1
CAPSULE, DELAYED RELEASE ORAL
COMMUNITY

## 2023-01-04 ENCOUNTER — TRANSCRIBE ORDERS (OUTPATIENT)
Dept: ADMINISTRATIVE | Facility: HOSPITAL | Age: 78
End: 2023-01-04
Payer: MEDICARE

## 2023-01-04 DIAGNOSIS — R10.30 LOWER ABDOMINAL PAIN: Primary | ICD-10-CM

## 2023-01-10 ENCOUNTER — HOSPITAL ENCOUNTER (OUTPATIENT)
Dept: GENERAL RADIOLOGY | Facility: HOSPITAL | Age: 78
Discharge: HOME | End: 2023-01-10
Admitting: INTERNAL MEDICINE
Payer: MEDICARE

## 2023-01-10 DIAGNOSIS — R13.12 OROPHARYNGEAL DYSPHAGIA: ICD-10-CM

## 2023-01-10 PROCEDURE — 63710000001 BARIUM SULFATE 40 % PASTE: Performed by: INTERNAL MEDICINE

## 2023-01-10 PROCEDURE — 63710000001 BARIUM SULFATE 40 % RECONSTITUTED SUSPENSION: Performed by: INTERNAL MEDICINE

## 2023-01-10 PROCEDURE — 92611 MOTION FLUOROSCOPY/SWALLOW: CPT

## 2023-01-10 PROCEDURE — A9270 NON-COVERED ITEM OR SERVICE: HCPCS | Performed by: INTERNAL MEDICINE

## 2023-01-10 PROCEDURE — 74230 X-RAY XM SWLNG FUNCJ C+: CPT

## 2023-01-10 RX ADMIN — BARIUM SULFATE 20 ML: 400 PASTE ORAL at 13:33

## 2023-01-10 RX ADMIN — BARIUM SULFATE 55 ML: 0.81 POWDER, FOR SUSPENSION ORAL at 13:34

## 2023-01-10 NOTE — MBS/VFSS/FEES
Outpatient Speech Language Pathology   Adult Swallow Initial Evaluation   Yadira   Modified Barium Swallow Study (MBS)     Patient Name: Holly Davis  : 1945  MRN: [id number]  Today's Date: 1/10/2023         Visit Date: 01/10/2023   Patient Active Problem List   Diagnosis   • Sinus tachycardia   • Osteoporosis   • Alopecia   • Hyperlipidemia LDL goal <100   • Pernicious anemia   • Essential hypertension   • Annual GYN exam S/P TAHBSO   • Chronic constipation        Past Medical History:   Diagnosis Date   • Alopecia    • Chronic constipation    • COVID-19 virus infection 2022   • Essential hypertension    • Hyperlipidemia LDL goal <100    • Low back pain    • Melanoma (HCC) of left shoulder 2022    LN's were (-)   • Monoclonal gammopathy    • Osteopenia of multiple sites    • Sinus tachycardia    • Stress fracture of left foot    • Stress fracture of right ankle         Past Surgical History:   Procedure Laterality Date   • BREAST EXCISIONAL BIOPSY Left    • CATARACT EXTRACTION W/ INTRAOCULAR LENS  IMPLANT, BILATERAL Right    • CATARACT EXTRACTION W/ INTRAOCULAR LENS  IMPLANT, BILATERAL Left    • ENDOSCOPY N/A 2019    Procedure: ESOPHAGOGASTRODUODENOSCOPY WITH DILATATION;  Surgeon: Brunner, Mark I, MD;  Location: [facility] ENDOSCOPY;  Service: Gastroenterology   • ENDOSCOPY N/A 2020    Procedure: ESOPHAGOGASTRODUODENOSCOPY WITH DILITATION;  Surgeon: Brunner, Mark I, MD;  Location: [facility] ENDOSCOPY;  Service: Gastroenterology   • ENDOSCOPY WITH FOREIGN BODY REMOVAL N/A 2019    Procedure: ESOPHAGOGASTRODUODENOSCOPY WITH FOREIGN BODY REMOVAL;  Surgeon: Brunner, Mark I, MD;  Location:  [facility];  Service: Gastroenterology   • LAPAROSCOPIC APPENDECTOMY  2007   • LUMBAR DISCECTOMY  2011    minimally invasive lumbar decompression   • PARTIAL KNEE ARTHROPLASTY Left 2018   • PARTIAL KNEE ARTHROPLASTY Right 2017    • SKIN CANCER EXCISION  02/2014    Melanoma on left shoulder   • TOTAL ABDOMINAL HYSTERECTOMY WITH SALPINGO OOPHORECTOMY Bilateral 1996    Dr. Page - done for bleeding         Visit Dx:     ICD-10-CM ICD-9-CM   1. Oropharyngeal dysphagia  R13.12 787.22            OP SLP Assessment/Plan - 01/10/23 1457        SLP Assessment    Functional Problems Swallowing  -MP    Impact on Function: Swallowing Risk of aspiration  -MP    Clinical Impression: Swallowing Mild:;oropharyngeal phase dysphagia  -MP          User Key  (r) = Recorded By, (t) = Taken By, (c) = Cosigned By    Initials Name Provider Type    MP Giovanni Pan, MS CCC-SLP Speech and Language Pathologist                 SLP Adult Swallow Evaluation     Row Name 01/10/23 1245       Rehab Evaluation    Document Type evaluation  -MP    Subjective Information no complaints  -MP    Patient Observations alert;cooperative  -MP    Patient/Family/Caregiver Comments/Observations No family present  -MP    Patient Effort good  -MP       General Information    Patient Profile Reviewed yes  -MP    Pertinent History Of Current Problem 78 y/o F presents for MBS today primarily 2' c/o excessive saliva build-up. Has seen ENT. Hx of esophageal stricture & dilation. Most recent EGD 11/23/22 w/ mild esophagitis, no stricture, some reflux w/ spasm.  -MP    Current Method of Nutrition regular textures;thin liquids  -MP    Precautions/Limitations, Vision WFL  -MP    Precautions/Limitations, Hearing WFL  -MP    Prior Level of Function-Communication WFL  -MP    Prior Level of Function-Swallowing no diet consistency restrictions  -MP    Plans/Goals Discussed with patient;agreed upon  -MP    Barriers to Rehab none identified  -MP    Patient's Goals for Discharge patient did not state  -MP       Pain    Additional Documentation Pain Scale: FACES Pre/Post-Treatment (Group)  -MP       Pain Scale: FACES Pre/Post-Treatment    Pain: FACES Scale, Pretreatment 0-->no hurt  -MP     Posttreatment Pain Rating 0-->no hurt  -       Oral Motor Structure and Function    Dentition Assessment natural, present and adequate  -    Secretion Management WNL/WFL  -MP    Mucosal Quality moist, healthy  -       Oral Musculature and Cranial Nerve Assessment    Oral Motor General Assessment WFL  -MP       General Eating/Swallowing Observations    Respiratory Support Currently in Use room air  -    Eating/Swallowing Skills self-fed  -MP    Positioning During Eating upright in chair  -       MBS/VFSS    Utensils Used spoon;cup;straw  -MP    Consistencies Trialed thin liquids;pudding thick;regular textures  -MP       MBS/VFSS Interpretation    VFSS Summary Mild oropharyngeal dysphagia. Deep penetration before the swallow w/ thin liquids via tsp only (w/ multiple tsp trials) 2' reduced lingual control/pre-spill, delay of initiation of the pharyngeal swallow, and reduced laryngeal vestibular closure. Subsequent transient aspiration just below the vocal folds- material spontaneously expelled into the laryngeal vestibule (but not completely out of the vestibule) upon completion of the swallow. Aspiration was completely silent - no response. No penetration/aspiration w/ single cup or straw sips of thin liquid. Transient penetration w/ consecutive straw sips of thin liquid. Mild vallecular residue w/ thin liquids that cleared by alternating w/ pudding/solid. Rec: regular diet/thin liquids, single cup/straw sips, avoid/modify mixed consistencies as able, OP SLP dysphagia tx.  ?? given pt overall generally healthy, unclear etiology for silent aspiration/dysphagia - ? may benefit from Neuro w/u.  -MP       SLP Evaluation Clinical Impression    SLP Swallowing Diagnosis mild;oral dysphagia;pharyngeal dysphagia  -    Functional Impact risk of aspiration/pneumonia  -       Recommendations    Therapy Frequency (Swallow) evaluation only  -    SLP Diet Recommendation regular textures;thin liquids;no mixed  consistencies  -MP    Recommended Precautions and Strategies upright posture during/after eating;small bites of food and sips of liquid;general aspiration precautions;other (see comments)  single cup/straw drinks, avoid/modify mixed consistencies as able  -MP    Oral Care Recommendations Oral Care BID/PRN  -MP    SLP Rec. for Method of Medication Administration as tolerated  -MP    Anticipated Discharge Disposition (SLP) home with OP services;anticipate therapy at next level of care  -MP    Demonstrates Need for Referral to Another Service neurology  -MP          User Key  (r) = Recorded By, (t) = Taken By, (c) = Cosigned By    Initials Name Provider Type    Giovanni Sanchez MS CCC-SLP Speech and Language Pathologist                               OP SLP Education     Row Name 01/10/23 1457       Education    Barriers to Learning No barriers identified  -MP    Education Provided Described results of evaluation;Patient expressed understanding of evaluation  -MP    Learning Method Explanation  -MP    Teaching Response Verbalized understanding  -MP          User Key  (r) = Recorded By, (t) = Taken By, (c) = Cosigned By    Initials Name Effective Dates    Giovanni Sanchez MS CCC-SLP 12/28/21 -                          Time Calculation:   SLP Start Time: 1245  Untimed Charges  [id number] Motion Fluoro Eval Swallow Minutes: 60  Total Minutes  Untimed Charges Total Minutes: 60   Total Minutes: 60    Therapy Charges for Today     Code Description Service Date Service Provider Modifiers Qty    [id number] HC ST MOTION FLUORO EVAL SWALLOW 4 1/10/2023 Giovanni Pan MS CCC-SLP GN 1                   Giovanni Killian MS CCC-SLP  1/10/2023

## 2023-01-26 ENCOUNTER — OFFICE VISIT (OUTPATIENT)
Dept: FAMILY MEDICINE CLINIC | Facility: CLINIC | Age: 78
End: 2023-01-26
Payer: MEDICARE

## 2023-01-26 VITALS
SYSTOLIC BLOOD PRESSURE: 116 MMHG | HEIGHT: 64 IN | BODY MASS INDEX: 19.6 KG/M2 | DIASTOLIC BLOOD PRESSURE: 76 MMHG | OXYGEN SATURATION: 98 % | WEIGHT: 114.8 LBS | HEART RATE: 67 BPM

## 2023-01-26 DIAGNOSIS — F51.04 PSYCHOPHYSIOLOGICAL INSOMNIA: Primary | ICD-10-CM

## 2023-01-26 DIAGNOSIS — R53.83 FATIGUE, UNSPECIFIED TYPE: ICD-10-CM

## 2023-01-26 DIAGNOSIS — R68.89 COLD INTOLERANCE: ICD-10-CM

## 2023-01-26 DIAGNOSIS — Z20.9 HISTORY OF EXPOSURE TO INFECTIOUS DISEASE: ICD-10-CM

## 2023-01-26 PROCEDURE — 99213 OFFICE O/P EST LOW 20 MIN: CPT | Performed by: FAMILY MEDICINE

## 2023-01-26 PROCEDURE — 36415 COLL VENOUS BLD VENIPUNCTURE: CPT | Performed by: FAMILY MEDICINE

## 2023-01-26 RX ORDER — METHOCARBAMOL 750 MG/1
TABLET, FILM COATED ORAL
COMMUNITY
Start: 2023-01-25

## 2023-01-26 RX ORDER — OXYCODONE HYDROCHLORIDE AND ACETAMINOPHEN 5; 325 MG/1; MG/1
TABLET ORAL
COMMUNITY

## 2023-01-26 RX ORDER — TRAZODONE HYDROCHLORIDE 100 MG/1
100 TABLET ORAL NIGHTLY
Qty: 90 TABLET | Refills: 1 | Status: SHIPPED | OUTPATIENT
Start: 2023-01-26

## 2023-01-26 RX ORDER — TEMAZEPAM 15 MG/1
15 CAPSULE ORAL NIGHTLY
Qty: 30 CAPSULE | Refills: 2 | Status: SHIPPED | OUTPATIENT
Start: 2023-01-26

## 2023-01-26 NOTE — PROGRESS NOTES
"Chief Complaint  Insomnia (Patient needs a refill on her meds)    Subjective          Holly Davis presents to [facility]  History of Present Illness  She comes in today to get her insomnia medications refilled she also states she is in had a lot of problems with cold intolerance and fatigue she wants to get some blood work done for that she is also concerned that she was told she never had chickenpox she would like to know whether she should get a shingles vaccine or not but because of this she has not been able to get it she would like to see is she recently borders also she has had exposure as well.  Insomnia  Associated symptoms include fatigue. Pertinent negatives include no arthralgias, congestion, nausea, rash or sore throat.       Objective   Vital Signs:   /76   Pulse 67   Ht 162.6 cm (64\")   Wt 52.1 kg (114 lb 12.8 oz)   SpO2 98%   BMI 19.71 kg/m²     Body mass index is 19.71 kg/m².    Review of Systems   Constitutional: Positive for fatigue.   HENT: Negative for congestion, dental problem, ear discharge, ear pain and sore throat.    Respiratory: Negative for apnea, chest tightness and shortness of breath.    Gastrointestinal: Negative for constipation and nausea.   Endocrine: Positive for cold intolerance. Negative for polyuria.   Genitourinary: Negative for difficulty urinating.   Musculoskeletal: Positive for back pain. Negative for arthralgias and gait problem.   Skin: Negative for rash.   Hematological: Negative for adenopathy.   Psychiatric/Behavioral: The patient has insomnia.        Past History:  Medical History: has a past medical history of Alopecia (2019), Chronic constipation (1972), COVID-19 virus infection (05/14/2022), Essential hypertension (2018), Hyperlipidemia LDL goal <100 (2015), Low back pain (2021), Melanoma (HCC) of left shoulder (02/2022), Monoclonal gammopathy (2006), Osteopenia of multiple sites (2014), Sinus tachycardia (1963), " Stress fracture of left foot (2006), and Stress fracture of right ankle (2000).   Surgical History: has a past surgical history that includes Cataract extraction w/ intraocular lens  implant, bilateral (Right, 2006); Esophagus foreign body removal (N/A, 12/01/2019); Esophagogastroduodenoscopy (N/A, 12/23/2019); Esophagogastroduodenoscopy (N/A, 01/28/2020); Breast excisional biopsy (Left, 1980); Total abdominal hysterectomy w/ bilateral salpingoophorectomy (Bilateral, 1996); Partial knee arthroplasty (Left, 04/2018); Partial knee arthroplasty (Right, 06/2017); Laparoscopic appendectomy (11/2007); Lumbar discectomy (07/2011); Cataract extraction w/ intraocular lens  implant, bilateral (Left, 2007); and Skin cancer excision (02/2014).         Current Outpatient Medications:   •  Calcium-Magnesium-Vitamin D - MG-MG-UNIT tablet sustained-release 24 hour, Take 1 tablet by mouth Daily., Disp: , Rfl:   •  celecoxib (CeleBREX) 100 MG capsule, , Disp: , Rfl:   •  dexlansoprazole (DEXILANT) 60 MG capsule, dexlansoprazole 60 mg capsule,biphase delayed release, Disp: , Rfl:   •  doxycycline (VIBRAMYCIN) 50 MG capsule, Take 50 mg by mouth Daily., Disp: , Rfl:   •  fluvastatin XL (LESCOL XL) 80 MG 24 hr tablet, TAKE ONE TABLET BY MOUTH EVERY NIGHT AT BEDTIME, Disp: 90 tablet, Rfl: 3  •  lisinopril (PRINIVIL,ZESTRIL) 20 MG tablet, Take 1 tablet by mouth Daily., Disp: 90 tablet, Rfl: 3  •  methocarbamol (ROBAXIN) 750 MG tablet, , Disp: , Rfl:   •  metoprolol succinate XL (TOPROL-XL) 25 MG 24 hr tablet, Take 1 tablet by mouth Daily., Disp: 90 tablet, Rfl: 3  •  oxyCODONE-acetaminophen (PERCOCET) 5-325 MG per tablet, Percocet 5 mg-325 mg tablet  i p.o. Q 4-6 hrs, prn, pain, max 4 per day, Disp: , Rfl:   •  Probiotic Product (PROBIOTIC DAILY PO), Take 1 tablet by mouth Daily., Disp: , Rfl:   •  RESTASIS 0.05 % ophthalmic emulsion, Administer 1 drop to both eyes 2 (Two) Times a Day., Disp: , Rfl: 3  •  Romosozumab-aqqg  (Evenity) 105 MG/1.17ML subcutaneous solution, Inject 2.34 mL under the skin into the appropriate area as directed Every 30 (Thirty) Days., Disp: 2.24 mL, Rfl:   •  temazepam (RESTORIL) 15 MG capsule, Take 1 capsule by mouth Every Night., Disp: 30 capsule, Rfl: 2  •  traZODone (DESYREL) 100 MG tablet, Take 1 tablet by mouth Every Night., Disp: 90 tablet, Rfl: 1    Allergies: 2,4-d dimethylamine (amisol); Cefdinir; Midazolam; Other; Midazolam hcl; Sulfa antibiotics; and Trimethoprim    Physical Exam  Vitals reviewed.   Constitutional:       Appearance: Normal appearance.   HENT:      Head: Normocephalic and atraumatic.      Right Ear: Tympanic membrane, ear canal and external ear normal.      Left Ear: Tympanic membrane, ear canal and external ear normal.      Nose: Nose normal.      Mouth/Throat:      Mouth: Mucous membranes are moist.   Eyes:      Extraocular Movements: Extraocular movements intact.      Conjunctiva/sclera: Conjunctivae normal.      Pupils: Pupils are equal, round, and reactive to light.   Cardiovascular:      Rate and Rhythm: Normal rate and regular rhythm.   Pulmonary:      Effort: Pulmonary effort is normal.      Breath sounds: Normal breath sounds.   Abdominal:      General: Abdomen is flat. Bowel sounds are normal.      Palpations: Abdomen is soft.   Musculoskeletal:         General: Normal range of motion.   Feet:      Right foot:      Protective Sensation: 0 sites tested. 0 sites sensed.      Toenail Condition: Right toenails are normal.      Left foot:      Protective Sensation: 0 sites tested. 0 sites sensed.      Toenail Condition: Left toenails are normal.   Skin:     General: Skin is warm and dry.   Neurological:      General: No focal deficit present.      Mental Status: She is alert and oriented to person, place, and time. Mental status is at baseline.   Psychiatric:         Behavior: Behavior normal.         Thought Content: Thought content normal.         Judgment: Judgment normal.           Result Review :                   Assessment and Plan    Diagnoses and all orders for this visit:    1. Psychophysiological insomnia (Primary)  Comments:  [place] refill medications  Orders:  -     temazepam (RESTORIL) 15 MG capsule; Take 1 capsule by mouth Every Night.  Dispense: 30 capsule; Refill: 2  -     traZODone (DESYREL) 100 MG tablet; Take 1 tablet by mouth Every Night.  Dispense: 90 tablet; Refill: 1    2. Cold intolerance  Comments:  CBC thyroid reviewed all the blood work    3. History of exposure to infectious disease  Comments:  We will get her titer for varicella and monitor  Orders:  -     Varicella zoster antibody, IgG; Future    4. Fatigue, unspecified type  Comments:  Blood work and monitor  Orders:  -     CBC & Differential; Future  -     TSH; Future              Follow Up   No follow-ups on file.  Patient was given instructions and counseling regarding her condition or for health maintenance advice. Please see specific information pulled into the AVS if appropriate.     Patrick Marquis MD  Answers for HPI/ROS submitted by the patient on 1/19/2023  Please describe your symptoms.: Very cold even in warm temperatures.  Have you had these symptoms before?: Yes  How long have you been having these symptoms?: Greater than 2 weeks  Please list any medications you are currently taking for this condition.: None  Please describe any probable cause for these symptoms. : Don’t know  What is the primary reason for your visit?: Other

## 2023-01-27 LAB
BASOPHILS # BLD AUTO: 0 X10E3/UL (ref 0–0.2)
BASOPHILS NFR BLD AUTO: 0 %
EOSINOPHIL # BLD AUTO: 0 X10E3/UL (ref 0–0.4)
EOSINOPHIL NFR BLD AUTO: 0 %
ERYTHROCYTE [DISTWIDTH] IN BLOOD BY AUTOMATED COUNT: 13.3 % (ref 11.7–15.4)
HCT VFR BLD AUTO: 36.8 % (ref 34–46.6)
HGB BLD-MCNC: 11.6 G/DL (ref 11.1–15.9)
IMM GRANULOCYTES # BLD AUTO: 0 X10E3/UL (ref 0–0.1)
IMM GRANULOCYTES NFR BLD AUTO: 0 %
LYMPHOCYTES # BLD AUTO: 1 X10E3/UL (ref 0.7–3.1)
LYMPHOCYTES NFR BLD AUTO: 14 %
MCH RBC QN AUTO: 28.6 PG (ref 26.6–33)
MCHC RBC AUTO-ENTMCNC: 31.5 G/DL (ref 31.5–35.7)
MCV RBC AUTO: 91 FL (ref 79–97)
MONOCYTES # BLD AUTO: 0.5 X10E3/UL (ref 0.1–0.9)
MONOCYTES NFR BLD AUTO: 7 %
NEUTROPHILS # BLD AUTO: 5.9 X10E3/UL (ref 1.4–7)
NEUTROPHILS NFR BLD AUTO: 79 %
PLATELET # BLD AUTO: 145 X10E3/UL (ref 150–450)
RBC # BLD AUTO: 4.05 X10E6/UL (ref 3.77–5.28)
TSH SERPL DL<=0.005 MIU/L-ACNC: 0.4 UIU/ML (ref 0.45–4.5)
VZV IGG SER IA-ACNC: 170 INDEX
WBC # BLD AUTO: 7.5 X10E3/UL (ref 3.4–10.8)

## 2023-02-07 ENCOUNTER — TELEPHONE (OUTPATIENT)
Dept: OBSTETRICS AND GYNECOLOGY | Facility: CLINIC | Age: 78
End: 2023-02-07
Payer: MEDICARE

## 2023-02-07 NOTE — TELEPHONE ENCOUNTER
Ms. Davis said that after starting the colace her constipation was better and the pelvic pain she thinks was related to the bowel issues as you thought.

## 2023-02-07 NOTE — TELEPHONE ENCOUNTER
Please call Holly and check to see how her constipation is doing.  I am curious if the things we discussed to improve bowel function helped her pelvic pain.  If it is not improving, would she be interested in trying some medication to see if it makes a difference in her pelvic pain?

## 2023-02-13 ENCOUNTER — TELEPHONE (OUTPATIENT)
Dept: FAMILY MEDICINE CLINIC | Facility: CLINIC | Age: 78
End: 2023-02-13

## 2023-02-13 NOTE — TELEPHONE ENCOUNTER
Caller: Holly Davis    Relationship: Self    Best call back number: [phone]    What is the best time to reach you: ANY     Who are you requesting to speak with (clinical staff, provider,  specific staff member): CLINICAL       What was the call regarding: PHARMACY SAYS A PRIOR AUTH IS NEEDED FOR fluvastatin XL (LESCOL XL) 80 MG 24 hr tablet, TRIED SUBSTITUTION FOR 3 DIFFERENT MEDICATIONS AND THEY DO NOT WORK ONLY THIS ONE WORKS FOR HER. SHE IS ABOUT OUT OF THE MEDICATION . WILL BE CALLED IN TO   [facility] [address] [place] [id number] [facility] [phone] [facility] [phone] FX        Do you require a callback: YES

## 2023-02-17 ENCOUNTER — TELEPHONE (OUTPATIENT)
Dept: FAMILY MEDICINE CLINIC | Facility: CLINIC | Age: 78
End: 2023-02-17
Payer: MEDICARE

## 2023-02-17 ENCOUNTER — TELEPHONE (OUTPATIENT)
Dept: FAMILY MEDICINE CLINIC | Facility: CLINIC | Age: 78
End: 2023-02-17

## 2023-02-17 NOTE — TELEPHONE ENCOUNTER
Caller: Holly Davis    Relationship: Self    Best call back number: [phone]    What is the best time to reach you: ANYTIME     Who are you requesting to speak with (clinical staff, provider,  specific staff member): ANDRADE     What was the call regarding: HAD A FEW QUESTIONS ABOUT THE DENIAL OF HER PA     Do you require a callback: YES

## 2023-02-23 ENCOUNTER — TELEPHONE (OUTPATIENT)
Dept: FAMILY MEDICINE CLINIC | Facility: CLINIC | Age: 78
End: 2023-02-23

## 2023-02-23 NOTE — TELEPHONE ENCOUNTER
fluvastatin XL (LESCOL XL) 80 MG 24 hr tablet NOT COVERED BY INSURANCE.  CAN WE CHANGE THE PRESCRIPTION TO SIMVASTATIN OR ROSUVASTATIN? PATIENT OUT OF MEDICATION.      PLEASE CALL OR RESEND.

## 2023-02-23 NOTE — TELEPHONE ENCOUNTER
Caller: Holly Davis    Relationship to patient: Self    Best call back number: [phone]    Patient is needing:   PATIENTS STATED SHE IS HAVING THE PHARMACY IN [place] TO FILL A 30 DAY SUPPLY.    PATIENT STATED SHE HAS TRIED SEVERAL DIFFERENT ALTERNATIVE PRESCRIPTION SUCH AS LIPITOR AND CRESTOR, AND THE FLUVASTATIN IS THE ONLY MEDICATION THAT WORKS.      PATIENT REQUESTING CALL BACK TO SEE WHAT DOCTOR SAMARA CAN DO TO GET THIS PRESCRIPTION AUTHORIZED    PATIENT STATED HER INSURANCE FAXED ANOTHER FORM ON THE 20TH FOR AN APPEAL.    PATIENT STATED SHE USED TO SEE A DOCTOR BOLA HOLDEN USED TO PRESCRIBE THE MEDICATION INITIALLY.     PATIENT PROVIDED THE DOCTOR NAVIN'S PHONE NUMBER TO HELP WITH THE APPEAL.  PHONE: [phone]    PATIENT STATED SHE IS COMPLETELY OUT.      PHARMACY:    [facility] [address] [place] [id number] [facility] - [phone] [facility] [phone]   [phone]

## 2023-04-25 ENCOUNTER — OFFICE VISIT (OUTPATIENT)
Dept: FAMILY MEDICINE CLINIC | Facility: CLINIC | Age: 78
End: 2023-04-25
Payer: MEDICARE

## 2023-04-25 VITALS
WEIGHT: 108.3 LBS | HEIGHT: 64 IN | HEART RATE: 70 BPM | SYSTOLIC BLOOD PRESSURE: 116 MMHG | OXYGEN SATURATION: 99 % | DIASTOLIC BLOOD PRESSURE: 65 MMHG | BODY MASS INDEX: 18.49 KG/M2

## 2023-04-25 DIAGNOSIS — F51.04 PSYCHOPHYSIOLOGICAL INSOMNIA: ICD-10-CM

## 2023-04-25 DIAGNOSIS — F41.9 ANXIETY: Primary | ICD-10-CM

## 2023-04-25 DIAGNOSIS — I10 PRIMARY HYPERTENSION: ICD-10-CM

## 2023-04-25 RX ORDER — ALPRAZOLAM 0.5 MG/1
0.5 TABLET ORAL DAILY PRN
Qty: 10 TABLET | Refills: 0 | Status: SHIPPED | OUTPATIENT
Start: 2023-04-25

## 2023-04-25 RX ORDER — FINASTERIDE 5 MG/1
TABLET, FILM COATED ORAL
COMMUNITY
Start: 2023-03-23

## 2023-04-25 RX ORDER — TEMAZEPAM 15 MG/1
15 CAPSULE ORAL NIGHTLY
Qty: 30 CAPSULE | Refills: 2 | Status: CANCELLED | OUTPATIENT
Start: 2023-04-25

## 2023-04-25 RX ORDER — FLUTICASONE PROPIONATE 50 MCG
SPRAY, SUSPENSION (ML) NASAL
COMMUNITY
Start: 2023-04-16

## 2023-04-25 NOTE — PROGRESS NOTES
"Chief Complaint  Anxiety    Subjective          Holly Davis presents to [facility]  History of Present Illness  Patient states that she is going to be traveling in [place] for about 2 weeks she says that she is worried about flying some things like to get some medications at all possible help calm down her anxiety she has been taking temazepam for sleep in the past and had not no real problems with it she said that she was given Versed 1 time years ago and it actually made her excessively sleepy and she had some problems with that but she has other medications like that orally she never had problems with recently she says she would like to try something to help her basically with the travels and helps with the anxiety associated with this      Objective   Vital Signs:   /65   Pulse 70   Ht 162.6 cm (64.02\")   Wt 49.1 kg (108 lb 4.8 oz)   SpO2 99%   BMI 18.58 kg/m²     Body mass index is 18.58 kg/m².    Review of Systems   Constitutional: Negative.    HENT: Negative for congestion, dental problem, ear discharge, ear pain and sore throat.    Respiratory: Negative for apnea, chest tightness and shortness of breath.    Gastrointestinal: Negative for constipation and nausea.   Endocrine: Negative for polyuria.   Genitourinary: Negative for difficulty urinating.   Musculoskeletal: Negative for arthralgias and gait problem.   Skin: Negative for rash.   Hematological: Negative for adenopathy.   Psychiatric/Behavioral: Positive for sleep disturbance. The patient is nervous/anxious.        Past History:  Medical History: has a past medical history of Alopecia (2019), Chronic constipation (1972), COVID-19 virus infection (05/14/2022), Essential hypertension (2018), Hyperlipidemia LDL goal <100 (2015), Low back pain (2021), Melanoma (HCC) of left shoulder (02/2022), Monoclonal gammopathy (2006), Osteopenia of multiple sites (2014), Sinus tachycardia (1963), Stress fracture of " left foot (2006), and Stress fracture of right ankle (2000).   Surgical History: has a past surgical history that includes Cataract extraction w/ intraocular lens  implant, bilateral (Right, 2006); Esophagus foreign body removal (N/A, 12/01/2019); Esophagogastroduodenoscopy (N/A, 12/23/2019); Esophagogastroduodenoscopy (N/A, 01/28/2020); Breast excisional biopsy (Left, 1980); Total abdominal hysterectomy w/ bilateral salpingoophorectomy (Bilateral, 1996); Partial knee arthroplasty (Left, 04/2018); Partial knee arthroplasty (Right, 06/2017); Laparoscopic appendectomy (11/2007); Lumbar discectomy (07/2011); Cataract extraction w/ intraocular lens  implant, bilateral (Left, 2007); and Skin cancer excision (02/2014).         Current Outpatient Medications:   •  celecoxib (CeleBREX) 100 MG capsule, , Disp: , Rfl:   •  dexlansoprazole (DEXILANT) 60 MG capsule, dexlansoprazole 60 mg capsule,biphase delayed release, Disp: , Rfl:   •  finasteride (PROSCAR) 5 MG tablet, , Disp: , Rfl:   •  fluticasone (FLONASE) 50 MCG/ACT nasal spray, , Disp: , Rfl:   •  fluvastatin XL (LESCOL XL) 80 MG 24 hr tablet, TAKE ONE TABLET BY MOUTH EVERY NIGHT AT BEDTIME, Disp: 90 tablet, Rfl: 3  •  lisinopril (PRINIVIL,ZESTRIL) 20 MG tablet, Take 1 tablet by mouth Daily., Disp: 90 tablet, Rfl: 3  •  methocarbamol (ROBAXIN) 750 MG tablet, , Disp: , Rfl:   •  metoprolol succinate XL (TOPROL-XL) 25 MG 24 hr tablet, Take 1 tablet by mouth Daily., Disp: 90 tablet, Rfl: 3  •  Probiotic Product (PROBIOTIC DAILY PO), Take 1 tablet by mouth Daily., Disp: , Rfl:   •  RESTASIS 0.05 % ophthalmic emulsion, Administer 1 drop to both eyes 2 (Two) Times a Day., Disp: , Rfl: 3  •  Romosozumab-aqqg (Evenity) 105 MG/1.17ML subcutaneous solution, Inject 2.34 mL under the skin into the appropriate area as directed Every 30 (Thirty) Days., Disp: 2.24 mL, Rfl:   •  temazepam (RESTORIL) 15 MG capsule, Take 1 capsule by mouth Every Night., Disp: 30 capsule, Rfl: 2  •   traZODone (DESYREL) 100 MG tablet, Take 1 tablet by mouth Every Night., Disp: 90 tablet, Rfl: 1  •  ALPRAZolam (Xanax) 0.5 MG tablet, Take 1 tablet by mouth Daily As Needed for Anxiety., Disp: 10 tablet, Rfl: 0  •  Calcium-Magnesium-Vitamin D - MG-MG-UNIT tablet sustained-release 24 hour, Take 1 tablet by mouth Daily., Disp: , Rfl:     Allergies: 2,4-d dimethylamine; Cefdinir; Midazolam; Other; Midazolam hcl; Sulfa antibiotics; and Trimethoprim    Physical Exam  Vitals reviewed.   Constitutional:       Appearance: Normal appearance.   HENT:      Head: Normocephalic and atraumatic.      Right Ear: Tympanic membrane, ear canal and external ear normal.      Left Ear: Tympanic membrane, ear canal and external ear normal.      Nose: Nose normal.      Mouth/Throat:      Mouth: Mucous membranes are moist.   Eyes:      Extraocular Movements: Extraocular movements intact.      Conjunctiva/sclera: Conjunctivae normal.      Pupils: Pupils are equal, round, and reactive to light.   Cardiovascular:      Rate and Rhythm: Normal rate and regular rhythm.   Pulmonary:      Effort: Pulmonary effort is normal.      Breath sounds: Normal breath sounds.   Abdominal:      General: Abdomen is flat. Bowel sounds are normal.      Palpations: Abdomen is soft.   Musculoskeletal:         General: Normal range of motion.   Feet:      Right foot:      Protective Sensation: 0 sites tested. 0 sites sensed.      Toenail Condition: Right toenails are normal.      Left foot:      Protective Sensation: 0 sites tested. 0 sites sensed.      Toenail Condition: Left toenails are normal.   Skin:     General: Skin is warm and dry.   Neurological:      General: No focal deficit present.      Mental Status: She is alert and oriented to person, place, and time. Mental status is at baseline.   Psychiatric:         Behavior: Behavior normal.         Thought Content: Thought content normal.         Judgment: Judgment normal.          Result Review :                    Assessment and Plan    Diagnoses and all orders for this visit:    1. Anxiety (Primary)  Comments:  Discussed her anxiety will try some Xanax on a as needed basis for her travels see if it also helps her if any other problems let me know   Orders:  -     ALPRAZolam (Xanax) 0.5 MG tablet; Take 1 tablet by mouth Daily As Needed for Anxiety.  Dispense: 10 tablet; Refill: 0    2. Psychophysiological insomnia  Comments:  Continue her as needed temazepam and discussed precautions with her Xanax with traveling    3. Primary hypertension  Comments:  Pressures been good control recently              Follow Up   No follow-ups on file.  Patient was given instructions and counseling regarding her condition or for health maintenance advice. Please see specific information pulled into the AVS if appropriate.     Patrick Marquis MD  Answers for HPI/ROS submitted by the patient on 4/18/2023  Please describe your symptoms.: Follow up on prescription  Have you had these symptoms before?: Yes  How long have you been having these symptoms?: Greater than 2 weeks  Please list any medications you are currently taking for this condition.: Tamazepam  What is the primary reason for your visit?: Other

## 2023-05-04 DIAGNOSIS — F51.04 PSYCHOPHYSIOLOGICAL INSOMNIA: ICD-10-CM

## 2023-05-04 RX ORDER — TEMAZEPAM 15 MG/1
15 CAPSULE ORAL NIGHTLY
Qty: 30 CAPSULE | Refills: 2 | OUTPATIENT
Start: 2023-05-04

## 2023-05-04 NOTE — TELEPHONE ENCOUNTER
Caller: [place] Hollyjm Ott    Relationship: Self    Best call back number: [phone]    Requested Prescriptions:   Requested Prescriptions     Pending Prescriptions Disp Refills   • temazepam (RESTORIL) 15 MG capsule 30 capsule 2     Sig: Take 1 capsule by mouth Every Night.        Pharmacy where request should be sent: [facility] [id number] [place], [place] - [address] [facility] AT [facility] [address] - [phone] [facility] [phone] FX     Last office visit with prescribing clinician: 4/25/2023   Last telemedicine visit with prescribing clinician: 7/25/2023   Next office visit with prescribing clinician: 7/25/2023     Additional details provided by patient: PT REQUEST REFILL PRIOR TO 5/10 BECAUSE SHE WILL BE LEAVING TO GO OUT OF TOWN ON 5/10.   Does the patient have less than a 3 day supply:  [x] Yes  [] No    Would you like a call back once the refill request has been completed: [x] Yes [] No    If the office needs to give you a call back, can they leave a voicemail: [x] Yes [] No    Madalyn Saede Rep   05/04/23 10:23 EDT

## 2023-05-05 NOTE — TELEPHONE ENCOUNTER
PATIENT MADE CONTACT TO CHECK THE STATUS OF HER REQUEST TO REFILL MEDICATION EARLY SINCE SHE IS LEAVING FOR OUT OF THE COUNTRY FOR (1) MONTH ON 5/10/23    temazepam (RESTORIL) 15 MG capsule    PATIENT STATED PHARMACY REQUIRES AUTHORIZATION FROM DR PASCUAL OR NURSE TO REFILL MEDICATION EARLY    PATIENT ALSO STATED SHE MISCALCULATED THE NUMBER OF CAPSULES SHE WILL NEED    PATIENT REQUESTED A (30) DAY SUPPLY, WHICH IS WHAT HER PRESCRIPTION ALREADY INCLUDES    DR PASCUAL

## 2023-07-31 DIAGNOSIS — F51.04 PSYCHOPHYSIOLOGICAL INSOMNIA: ICD-10-CM

## 2023-07-31 RX ORDER — TRAZODONE HYDROCHLORIDE 100 MG/1
100 TABLET ORAL NIGHTLY
Qty: 90 TABLET | Refills: 0 | Status: SHIPPED | OUTPATIENT
Start: 2023-07-31

## 2023-08-10 ENCOUNTER — TELEPHONE (OUTPATIENT)
Dept: FAMILY MEDICINE CLINIC | Facility: CLINIC | Age: 78
End: 2023-08-10
Payer: MEDICARE

## 2023-08-10 NOTE — TELEPHONE ENCOUNTER
Caller: Holly Davis    Relationship to patient: Self    Best call back number: [phone]     Chief complaint: ASPIRATED ACID AND HAS BEEN HAVING A WET COUGH SINCE    Type of visit: OFFICE VISIT     Requested date: ASAP      Additional notes: PATIENT ASPIRATED ACID AND HAS BEEN HAVING A WET COUGH SINCE. SHE HAS BEEN OUT OF TOWN DUE TO A  BUT WILL BE BACK TOMORROW AROUND 1 PM. SHE IS REQUESTING TO BE SEEN BY DR PASCUAL OR CARLOS CROSS  TOMORROW 23. PLEASE ADVISE AND CALL PATIENT ASAP

## 2023-08-12 ENCOUNTER — OFFICE VISIT (OUTPATIENT)
Dept: FAMILY MEDICINE CLINIC | Facility: CLINIC | Age: 78
End: 2023-08-12
Payer: MEDICARE

## 2023-08-12 VITALS
TEMPERATURE: 98 F | DIASTOLIC BLOOD PRESSURE: 68 MMHG | BODY MASS INDEX: 18.01 KG/M2 | HEIGHT: 64 IN | WEIGHT: 105.5 LBS | HEART RATE: 67 BPM | SYSTOLIC BLOOD PRESSURE: 108 MMHG | OXYGEN SATURATION: 97 %

## 2023-08-12 DIAGNOSIS — R05.1 ACUTE COUGH: Primary | ICD-10-CM

## 2023-08-12 DIAGNOSIS — L98.9 SKIN LESION OF LEFT ARM: ICD-10-CM

## 2023-08-12 PROCEDURE — 1160F RVW MEDS BY RX/DR IN RCRD: CPT | Performed by: PHYSICIAN ASSISTANT

## 2023-08-12 PROCEDURE — 3074F SYST BP LT 130 MM HG: CPT | Performed by: PHYSICIAN ASSISTANT

## 2023-08-12 PROCEDURE — 99213 OFFICE O/P EST LOW 20 MIN: CPT | Performed by: PHYSICIAN ASSISTANT

## 2023-08-12 PROCEDURE — 1159F MED LIST DOCD IN RCRD: CPT | Performed by: PHYSICIAN ASSISTANT

## 2023-08-12 PROCEDURE — 3078F DIAST BP <80 MM HG: CPT | Performed by: PHYSICIAN ASSISTANT

## 2023-08-12 NOTE — ASSESSMENT & PLAN NOTE
I recommend Mucinex for the nonproductive cough.  I will order a chest x-ray to further evaluate.  We discussed trying antibiotics.  I advised that she does not necessarily need to due to her oxygen level, clear lungs, and the fact that she is overall feeling well.  She and her  agree, and they do not want to start any antibiotics at this time.  They also agreed to return for the x-ray and continue to monitor her closely.  I think that this is a good plan.  I also recommend that they consider follow-up with GI if she continues to have such severe flareups with her heartburn.  They are in agreement.

## 2023-08-12 NOTE — PROGRESS NOTES
Office Note     Name: Holly Davis    : 1945     MRN: [id number]     Chief Complaint  Heartburn    Subjective     History of Present Illness:  Holly Davis is a 77 y.o. female accompanied by her  who presents today for eval of cough.  She states that she woke up coughing and choking with acid reflux 9 days ago, and she thinks she may have aspirated some food.  She states that she has developed a thick mucus, which is difficult to cough up.  She states that she was getting a small amount up at first, but she has not been able to more recently.  She states that she has not had any more flareups of her acid reflux, and she has continued to take her Dexilant.  She states that she had and upper scope about 6 to 8 months ago with Dr. Love.  She denies any known fever, tightness in her chest, or shortness of breath, but she and her  were a little concerned about potential pneumonia.  Her  is a retired physician, he has been monitoring her and listening to her lungs.  He states that he thinks they sound more clear today than they have, but they have not sounded like she had any pneumonia.  They would like to do a chest x-ray to make sure there is no aspirate or pneumonia.    She states that she also noticed a small lesion pop up on her left arm this morning.  She states that it is mildly itchy, but it is not really bothering her.  She denies any burning or pain.  She has not put anything on it.    Review of Systems:   Review of Systems   HENT:  Negative for sore throat and trouble swallowing.    Respiratory:  Positive for cough and choking. Negative for chest tightness and shortness of breath.    Gastrointestinal:  Positive for GERD. Negative for nausea, vomiting and indigestion.     Past Medical History:   Past Medical History:   Diagnosis Date    Alopecia 2019    Chronic constipation     Coronary artery disease ?    Tachycardia (controlled)    COVID-19 virus  infection 05/14/2022    Essential hypertension 2018    Hyperlipidemia LDL goal <100 2015    Low back pain 2021    Melanoma (HCC) of left shoulder 02/2022    LN's were (-)    Monoclonal gammopathy 2006    Osteopenia of multiple sites 2014    Sinus tachycardia 1963    Stress fracture of left foot 2006    Stress fracture of right ankle 2000    Vision loss 1960?    Near sighted & 2000 macular degeneration       Past Surgical History:   Past Surgical History:   Procedure Laterality Date    BREAST EXCISIONAL BIOPSY Left 1980    CATARACT EXTRACTION W/ INTRAOCULAR LENS  IMPLANT, BILATERAL Right 2006    CATARACT EXTRACTION W/ INTRAOCULAR LENS  IMPLANT, BILATERAL Left 2007    ENDOSCOPY N/A 12/23/2019    Procedure: ESOPHAGOGASTRODUODENOSCOPY WITH DILATATION;  Surgeon: Brunner, Mark I, MD;  Location:  [facility];  Service: Gastroenterology    ENDOSCOPY N/A 01/28/2020    Procedure: ESOPHAGOGASTRODUODENOSCOPY WITH DILITATION;  Surgeon: Brunner, Mark I, MD;  Location:  [facility];  Service: Gastroenterology    ENDOSCOPY WITH FOREIGN BODY REMOVAL N/A 12/01/2019    Procedure: ESOPHAGOGASTRODUODENOSCOPY WITH FOREIGN BODY REMOVAL;  Surgeon: Brunner, Mark I, MD;  Location:  [facility];  Service: Gastroenterology    LAPAROSCOPIC APPENDECTOMY  11/2007    LUMBAR DISCECTOMY  07/2011    minimally invasive lumbar decompression    PARTIAL KNEE ARTHROPLASTY Left 04/2018    PARTIAL KNEE ARTHROPLASTY Right 06/2017    SKIN CANCER EXCISION  02/2014    Melanoma on left shoulder    TOTAL ABDOMINAL HYSTERECTOMY WITH SALPINGO OOPHORECTOMY Bilateral 1996    Dr. Page - done for bleeding       Family History:   Family History   Problem Relation Age of Onset    Breast cancer Maternal Aunt 64    Breast cancer Paternal Cousin 60    Breast cancer Maternal Cousin 65    Ovarian cancer Neg Hx        Social History:   Social History     Socioeconomic History    Marital status:    Tobacco Use    Smoking status: Never     Passive exposure:  Never    Smokeless tobacco: Never   Vaping Use    Vaping Use: Never used   Substance and Sexual Activity    Alcohol use: Yes     Alcohol/week: 5.0 standard drinks     Types: 3 Glasses of wine, 2 Cans of beer per week     Comment: Social    Drug use: Never    Sexual activity: Yes     Partners: Male     Birth control/protection: Surgical, Post-menopausal, Hysterectomy       Immunizations:   Immunization History   Administered Date(s) Administered    COVID-19 (PFIZER) BIVALENT 12+YRS 10/25/2022, 04/22/2023    COVID-19 (PFIZER) Purple Cap Monovalent 01/24/2021, 02/20/2021, 08/20/2021    Covid-19 (Pfizer) Gray Cap Monovalent 04/08/2022    Fluad Quad 65+ 10/07/2020    Fluzone High Dose =>65 Years (Vaxcare ONLY) 10/14/2016, 11/02/2021    Fluzone High-Dose 65+yrs 10/25/2022    Fluzone Quad >6mos (Multi-dose) 10/20/2015, 10/22/2018, 10/08/2019    Pneumococcal Conjugate 13-Valent (PCV13) 10/20/2015    Pneumococcal Polysaccharide (PPSV23) 07/20/2021        Medications:     Current Outpatient Medications:     Calcium-Magnesium-Vitamin D - MG-MG-UNIT tablet sustained-release 24 hour, Take 1 tablet by mouth Daily., Disp: , Rfl:     celecoxib (CeleBREX) 100 MG capsule, Take 1 capsule by mouth Daily., Disp: , Rfl:     dexlansoprazole (DEXILANT) 60 MG capsule, dexlansoprazole 60 mg capsule,biphase delayed release, Disp: , Rfl:     doxycycline (VIBRAMYCIN) 50 MG capsule, Take 1 capsule by mouth Daily. Dry eyes, Disp: , Rfl:     finasteride (PROSCAR) 5 MG tablet, Take 1 tablet by mouth Daily., Disp: , Rfl:     fluticasone (FLONASE) 50 MCG/ACT nasal spray, 2 sprays into the nostril(s) as directed by provider Daily., Disp: , Rfl:     fluvastatin XL (LESCOL XL) 80 MG 24 hr tablet, TAKE ONE TABLET BY MOUTH EVERY NIGHT AT BEDTIME, Disp: 90 tablet, Rfl: 3    lisinopril (PRINIVIL,ZESTRIL) 20 MG tablet, Take 1 tablet by mouth Daily., Disp: 90 tablet, Rfl: 3    methocarbamol (ROBAXIN) 750 MG tablet, As Needed., Disp: , Rfl:      "metoprolol succinate XL (TOPROL-XL) 25 MG 24 hr tablet, Take 1 tablet by mouth Daily., Disp: 90 tablet, Rfl: 3    Probiotic Product (PROBIOTIC DAILY PO), Take 1 tablet by mouth Daily., Disp: , Rfl:     RESTASIS 0.05 % ophthalmic emulsion, Administer 1 drop to both eyes 2 (Two) Times a Day., Disp: , Rfl: 3    Romosozumab-aqqg (Evenity) 105 MG/1.17ML subcutaneous solution, Inject 2.34 mL under the skin into the appropriate area as directed Every 30 (Thirty) Days., Disp: 2.24 mL, Rfl:     temazepam (RESTORIL) 15 MG capsule, Take 1 capsule by mouth Every Night., Disp: 90 capsule, Rfl: 0    traZODone (DESYREL) 100 MG tablet, Take 1 tablet by mouth Every Night., Disp: 90 tablet, Rfl: 0    Allergies:   Allergies   Allergen Reactions    2,4-D Dimethylamine Rash    Cefdinir Other (See Comments) and Unknown (See Comments)     \"OMNICEF (CEFDINIR) GAVE ME C-DIFF-SEVERAL YEARS AGO\"  Other reaction(s): Other (See Comments), Other (See Comments)  \"OMNICEF (CEFDINIR) GAVE ME C-DIFF-SEVERAL YEARS AGO\"  \"OMNICEF (CEFDINIR) GAVE ME C-DIFF-SEVERAL YEARS AGO\"    Other reaction(s): Other, Unknown  \"OMNICEF (CEFDINIR) GAVE ME C-DIFF-SEVERAL YEARS AGO\"  \"OMNICEF (CEFDINIR) GAVE ME C-DIFF-SEVERAL YEARS AGO\"  \"OMNICEF (CEFDINIR) GAVE ME C-DIFF-SEVERAL YEARS AGO\"      Midazolam Other (See Comments) and Unknown (See Comments)     \"LAST TIME TOOK VERSED,COULD NOT BREATHE,WAS GIVEN REVERSAL DRUG\"  Other reaction(s): Other (See Comments), Other (See Comments)  \"LAST TIME TOOK VERSED,COULD NOT BREATHE,WAS GIVEN REVERSAL DRUG\"  \"LAST TIME TOOK VERSED,COULD NOT BREATHE,WAS GIVEN REVERSAL DRUG\"    Other reaction(s): Other, Unknown  \"LAST TIME TOOK VERSED,COULD NOT BREATHE,WAS GIVEN REVERSAL DRUG\"  \"LAST TIME TOOK VERSED,COULD NOT BREATHE,WAS GIVEN REVERSAL DRUG\"  \"LAST TIME TOOK VERSED,COULD NOT BREATHE,WAS GIVEN REVERSAL DRUG\"      Other Hives     OMNI IV  OMNI IV  OMNI IV    Midazolam Hcl Hives    Sulfa Antibiotics Rash    Trimethoprim Unknown (See " "Comments), Rash and Hives     Other reaction(s): Unknown (See Comments)  Other reaction(s): Unknown (See Comments)  Other reaction(s): Unknown (See Comments)  Other reaction(s): Unknown (See Comments)  Other reaction(s): Unknown (See Comments)         Objective     Vital Signs  /68   Pulse 67   Temp 98 øF (36.7 øC) (Temporal)   Ht 162.6 cm (64\")   Wt 47.9 kg (105 lb 8 oz)   SpO2 97%   BMI 18.11 kg/mý   Estimated body mass index is 18.11 kg/mý as calculated from the following:    Height as of this encounter: 162.6 cm (64\").    Weight as of this encounter: 47.9 kg (105 lb 8 oz).    Physical Exam  Vitals and nursing note reviewed.   Constitutional:       General: She is not in acute distress.     Appearance: Normal appearance. She is normal weight. She is not ill-appearing.   HENT:      Head: Normocephalic and atraumatic.   Cardiovascular:      Rate and Rhythm: Normal rate and regular rhythm.      Pulses: Normal pulses.      Heart sounds: Normal heart sounds.   Pulmonary:      Effort: Pulmonary effort is normal. No respiratory distress.      Breath sounds: Normal breath sounds. No wheezing.   Skin:     Findings: Lesion (Erythematous macular lesion with small central lighter macular lesion) present.   Neurological:      General: No focal deficit present.      Mental Status: She is alert and oriented to person, place, and time.   Psychiatric:         Mood and Affect: Mood normal.         Behavior: Behavior normal.      Assessment and Plan     Assessment/Plan:  Diagnoses and all orders for this visit:    1. Acute cough (Primary)  Assessment & Plan:  I recommend Mucinex for the nonproductive cough.  I will order a chest x-ray to further evaluate.  We discussed trying antibiotics.  I advised that she does not necessarily need to due to her oxygen level, clear lungs, and the fact that she is overall feeling well.  She and her  agree, and they do not want to start any antibiotics at this time.  They also " agreed to return for the x-ray and continue to monitor her closely.  I think that this is a good plan.  I also recommend that they consider follow-up with GI if she continues to have such severe flareups with her heartburn.  They are in agreement.    Orders:  -     XR Chest 2 View; Future    2. Skin lesion of left arm  Assessment & Plan:  The lesion on her left arm appears to be an insect bite.  I just advised Benadryl cream or hydrocortisone 1% for the itching as well as monitoring.  She and her  are in agreement.          Follow Up  Return for next scheduled f/u or sooner PRN.    Valarie Wilder PA-C  [facility]

## 2023-08-12 NOTE — ASSESSMENT & PLAN NOTE
The lesion on her left arm appears to be an insect bite.  I just advised Benadryl cream or hydrocortisone 1% for the itching as well as monitoring.  She and her  are in agreement.

## 2023-08-14 ENCOUNTER — TELEPHONE (OUTPATIENT)
Dept: FAMILY MEDICINE CLINIC | Facility: CLINIC | Age: 78
End: 2023-08-14
Payer: MEDICARE

## 2023-08-14 RX ORDER — AMOXICILLIN AND CLAVULANATE POTASSIUM 875; 125 MG/1; MG/1
1 TABLET, FILM COATED ORAL 2 TIMES DAILY
Qty: 20 TABLET | Refills: 0 | Status: SHIPPED | OUTPATIENT
Start: 2023-08-14

## 2023-08-14 NOTE — TELEPHONE ENCOUNTER
PT SAYS THAT SHE NOW WANTS ANTIBOTICS CALLED IN FROM HER VISIT ON SATURDAY..PLEASE CALL.PLEASE USE [facility]

## 2023-08-17 ENCOUNTER — TRANSCRIBE ORDERS (OUTPATIENT)
Dept: ADMINISTRATIVE | Facility: HOSPITAL | Age: 78
End: 2023-08-17
Payer: MEDICARE

## 2023-08-17 DIAGNOSIS — Z12.31 VISIT FOR SCREENING MAMMOGRAM: Primary | ICD-10-CM

## 2023-09-27 ENCOUNTER — OFFICE VISIT (OUTPATIENT)
Dept: FAMILY MEDICINE CLINIC | Facility: CLINIC | Age: 78
End: 2023-09-27
Payer: MEDICARE

## 2023-09-27 VITALS
RESPIRATION RATE: 20 BRPM | BODY MASS INDEX: 18.68 KG/M2 | HEIGHT: 64 IN | TEMPERATURE: 96.9 F | SYSTOLIC BLOOD PRESSURE: 112 MMHG | DIASTOLIC BLOOD PRESSURE: 72 MMHG | OXYGEN SATURATION: 96 % | HEART RATE: 77 BPM | WEIGHT: 109.4 LBS

## 2023-09-27 DIAGNOSIS — F51.04 PSYCHOPHYSIOLOGICAL INSOMNIA: ICD-10-CM

## 2023-09-27 RX ORDER — CIPROFLOXACIN 500 MG/1
TABLET, FILM COATED ORAL
COMMUNITY

## 2023-09-27 RX ORDER — GENTAMICIN SULFATE 3 MG/ML
SOLUTION/ DROPS OPHTHALMIC
COMMUNITY

## 2023-09-27 RX ORDER — OSELTAMIVIR PHOSPHATE 75 MG/1
CAPSULE ORAL
COMMUNITY

## 2023-09-27 RX ORDER — TEMAZEPAM 15 MG/1
15 CAPSULE ORAL NIGHTLY
Qty: 90 CAPSULE | Refills: 0 | Status: SHIPPED | OUTPATIENT
Start: 2023-09-27

## 2023-09-27 RX ORDER — RALOXIFENE HYDROCHLORIDE 60 MG/1
1 TABLET, FILM COATED ORAL DAILY
COMMUNITY

## 2023-09-27 RX ORDER — OXYCODONE HYDROCHLORIDE AND ACETAMINOPHEN 5; 325 MG/1; MG/1
TABLET ORAL
COMMUNITY
Start: 2023-05-03

## 2023-09-27 RX ORDER — CHLORHEXIDINE GLUCONATE ORAL RINSE 1.2 MG/ML
SOLUTION DENTAL
COMMUNITY

## 2023-09-27 NOTE — PROGRESS NOTES
"Chief Complaint  Insomnia and Follow-up    Subjective          Holly Davis presents to [facility]  History of Present Illness  Patient comes in today saying she needs get her medications refilled says she is doing relatively well she says that she is planning on getting her COVID and flu vaccines shortly as well denies any other real difficulties    Objective   Vital Signs:   /72   Pulse 77   Temp 96.9 °F (36.1 °C)   Resp 20   Ht 162.6 cm (64.02\")   Wt 49.6 kg (109 lb 6.4 oz)   SpO2 96%   BMI 18.77 kg/m²     Body mass index is 18.77 kg/m².    Review of Systems   Constitutional: Negative.    HENT:  Negative for congestion, dental problem, ear discharge, ear pain and sore throat.    Respiratory:  Negative for apnea, chest tightness and shortness of breath.    Gastrointestinal:  Negative for constipation and nausea.   Endocrine: Negative for polyuria.   Genitourinary:  Negative for difficulty urinating.   Musculoskeletal:  Negative for arthralgias and gait problem.   Skin:  Negative for rash.   Hematological:  Negative for adenopathy.   Psychiatric/Behavioral:  Positive for sleep disturbance. The patient is nervous/anxious.      Past History:  Medical History: has a past medical history of Alopecia (2019), Chronic constipation (1972), Coronary artery disease (1955?), COVID-19 virus infection (05/14/2022), Essential hypertension (2018), Hyperlipidemia LDL goal <100 (2015), Low back pain (2021), Melanoma (HCC) of left shoulder (02/2022), Monoclonal gammopathy (2006), Osteopenia of multiple sites (2014), Sinus tachycardia (1963), Stress fracture of left foot (2006), Stress fracture of right ankle (2000), and Vision loss (1960?).   Surgical History: has a past surgical history that includes Cataract extraction w/ intraocular lens  implant, bilateral (Right, 2006); Esophagus foreign body removal (N/A, 12/01/2019); Esophagogastroduodenoscopy (N/A, 12/23/2019); " Esophagogastroduodenoscopy (N/A, 01/28/2020); Breast excisional biopsy (Left, 1980); Total abdominal hysterectomy w/ bilateral salpingoophorectomy (Bilateral, 1996); Partial knee arthroplasty (Left, 04/2018); Partial knee arthroplasty (Right, 06/2017); Laparoscopic appendectomy (11/2007); Lumbar discectomy (07/2011); Cataract extraction w/ intraocular lens  implant, bilateral (Left, 2007); and Skin cancer excision (02/2014).         Current Outpatient Medications:     Calcium-Magnesium-Vitamin D - MG-MG-UNIT tablet sustained-release 24 hour, Take 1 tablet by mouth Daily., Disp: , Rfl:     celecoxib (CeleBREX) 100 MG capsule, Take 1 capsule by mouth Daily., Disp: , Rfl:     chlorhexidine (PERIDEX) 0.12 % solution, , Disp: , Rfl:     ciprofloxacin (CIPRO) 500 MG tablet, TAKE 1 TABLET BY MOUTH EVERY 12 HOURS AS DIRECTED FOR INTERNATIONAL TRAVEL. NEED DR DELEON, Disp: , Rfl:     dexlansoprazole (DEXILANT) 60 MG capsule, dexlansoprazole 60 mg capsule,biphase delayed release, Disp: , Rfl:     doxycycline (VIBRAMYCIN) 50 MG capsule, Take 1 capsule by mouth Daily. Dry eyes, Disp: , Rfl:     finasteride (PROSCAR) 5 MG tablet, Take 1 tablet by mouth Daily., Disp: , Rfl:     fluticasone (FLONASE) 50 MCG/ACT nasal spray, 2 sprays into the nostril(s) as directed by provider Daily., Disp: , Rfl:     fluvastatin XL (LESCOL XL) 80 MG 24 hr tablet, TAKE ONE TABLET BY MOUTH EVERY NIGHT AT BEDTIME, Disp: 90 tablet, Rfl: 3    gentamicin (GARAMYCIN) 0.3 % ophthalmic solution, INSTILL 1 DROP INTO RIGHT EYE TWICE DAILY, Disp: , Rfl:     lisinopril (PRINIVIL,ZESTRIL) 20 MG tablet, Take 1 tablet by mouth Daily., Disp: 90 tablet, Rfl: 3    methocarbamol (ROBAXIN) 750 MG tablet, As Needed., Disp: , Rfl:     metoprolol succinate XL (TOPROL-XL) 25 MG 24 hr tablet, Take 1 tablet by mouth Daily., Disp: 90 tablet, Rfl: 3    metoprolol tartrate (LOPRESSOR) 25 MG tablet, , Disp: , Rfl:     oseltamivir (TAMIFLU) 75 MG capsule, TAKE AS  DIRECTED BY MOUTH DURING INTERNATIONAL TRAVEL. NEED DR DELEON, Disp: , Rfl:     oxyCODONE-acetaminophen (PERCOCET) 5-325 MG per tablet, i p.o. Q 4-6 hrs, prn, pain, max 4 per day, Disp: , Rfl:     Phytonadione (MEPHYTON PO), , Disp: , Rfl:     Probiotic Product (PROBIOTIC DAILY PO), Take 1 tablet by mouth Daily., Disp: , Rfl:     raloxifene (EVISTA) 60 MG tablet, Take 1 tablet by mouth Daily., Disp: , Rfl:     RESTASIS 0.05 % ophthalmic emulsion, Administer 1 drop to both eyes 2 (Two) Times a Day., Disp: , Rfl: 3    temazepam (RESTORIL) 15 MG capsule, Take 1 capsule by mouth Every Night., Disp: 90 capsule, Rfl: 0    traZODone (DESYREL) 100 MG tablet, Take 1 tablet by mouth Every Night., Disp: 90 tablet, Rfl: 0    Romosozumab-aqqg (Evenity) 105 MG/1.17ML subcutaneous solution, Inject 2.34 mL under the skin into the appropriate area as directed Every 30 (Thirty) Days., Disp: 2.24 mL, Rfl:     Allergies: 2,4-d dimethylamine; Cefdinir; Midazolam; Other; Midazolam hcl; Sulfa antibiotics; and Trimethoprim    Physical Exam  Vitals reviewed.   Constitutional:       Appearance: Normal appearance.   HENT:      Head: Normocephalic and atraumatic.      Right Ear: Tympanic membrane, ear canal and external ear normal.      Left Ear: Tympanic membrane, ear canal and external ear normal.      Nose: Nose normal.      Mouth/Throat:      Mouth: Mucous membranes are moist.   Eyes:      Extraocular Movements: Extraocular movements intact.      Conjunctiva/sclera: Conjunctivae normal.      Pupils: Pupils are equal, round, and reactive to light.   Cardiovascular:      Rate and Rhythm: Normal rate and regular rhythm.   Pulmonary:      Effort: Pulmonary effort is normal.      Breath sounds: Normal breath sounds.   Abdominal:      General: Abdomen is flat. Bowel sounds are normal.      Palpations: Abdomen is soft.   Musculoskeletal:         General: Normal range of motion.   Feet:      Right foot:      Protective Sensation: 0 sites tested.  0  sites sensed.      Toenail Condition: Right toenails are normal.      Left foot:      Protective Sensation: 0 sites tested.  0 sites sensed.      Toenail Condition: Left toenails are normal.   Skin:     General: Skin is warm and dry.   Neurological:      General: No focal deficit present.      Mental Status: She is alert and oriented to person, place, and time. Mental status is at baseline.   Psychiatric:         Behavior: Behavior normal.         Thought Content: Thought content normal.         Judgment: Judgment normal.        Result Review :                   Assessment and Plan    Diagnoses and all orders for this visit:    1. Psychophysiological insomnia  Comments:  [place] refill medications  Orders:  -     temazepam (RESTORIL) 15 MG capsule; Take 1 capsule by mouth Every Night.  Dispense: 90 capsule; Refill: 0              Follow Up   No follow-ups on file.  Patient was given instructions and counseling regarding her condition or for health maintenance advice. Please see specific information pulled into the AVS if appropriate.     Hiral Hawthorne submitted by the patient for this visit:  Other (Submitted on 9/20/2023)  Please describe your symptoms.: N/A, Need prescription refill  Have you had these symptoms before?: No  How long have you been having these symptoms?: Greater than 2 weeks  Please list any medications you are currently taking for this condition.: Temazepam 15 mg  Please describe any probable cause for these symptoms. : Not able to sleep  Primary Reason for Visit (Submitted on 9/20/2023)  What is the primary reason for your visit?: Other

## 2023-10-20 ENCOUNTER — OFFICE VISIT (OUTPATIENT)
Dept: NEUROLOGY | Facility: CLINIC | Age: 78
End: 2023-10-20
Payer: MEDICARE

## 2023-10-20 VITALS — DIASTOLIC BLOOD PRESSURE: 70 MMHG | OXYGEN SATURATION: 100 % | HEART RATE: 56 BPM | SYSTOLIC BLOOD PRESSURE: 128 MMHG

## 2023-10-20 DIAGNOSIS — R13.12 DYSPHAGIA, OROPHARYNGEAL: ICD-10-CM

## 2023-10-20 DIAGNOSIS — K11.7 HYPERSECRETION OF SALIVA: Primary | ICD-10-CM

## 2023-10-20 PROCEDURE — 1160F RVW MEDS BY RX/DR IN RCRD: CPT | Performed by: PSYCHIATRY & NEUROLOGY

## 2023-10-20 PROCEDURE — 3074F SYST BP LT 130 MM HG: CPT | Performed by: PSYCHIATRY & NEUROLOGY

## 2023-10-20 PROCEDURE — 3078F DIAST BP <80 MM HG: CPT | Performed by: PSYCHIATRY & NEUROLOGY

## 2023-10-20 PROCEDURE — 1159F MED LIST DOCD IN RCRD: CPT | Performed by: PSYCHIATRY & NEUROLOGY

## 2023-10-20 PROCEDURE — 99214 OFFICE O/P EST MOD 30 MIN: CPT | Performed by: PSYCHIATRY & NEUROLOGY

## 2023-10-20 RX ORDER — GLYCOPYRROLATE 1 MG/1
1 TABLET ORAL EVERY MORNING
Qty: 30 TABLET | Refills: 1 | Status: SHIPPED | OUTPATIENT
Start: 2023-10-20 | End: 2023-11-19

## 2023-10-20 NOTE — PROGRESS NOTES
Subjective:    CC: Holly Davis is in clinic today for follow up for history of sialorrhea and dysphagia.    HPI:  Initial visit: 6/27/2023: Patient is a 77-year-old female with past medical history of hypertension, hyperlipidemia, and chronic low back pain, referred to the clinic for evaluation of excessive saliva secretion. She is accompanied by her . She reports that she started getting excessive saliva in her mouth since 11/2022, which is worse when she is speaking. She has a history of gastroesophageal reflux disease, which is well controlled with Dexilant and Pepcid.  She eats with very small bites after an episode of food bolus getting stuck, requiring an upper endoscopy to remove it and esophageal dilation. She had a swallow test with a speech therapist, which revealed mild oropharyngeal dysphagia. She is able to eat and drink without trouble. Her saliva production is slightly more pronounced in the evening. She is able to clear saliva without choking. She occasionally aspirates liquids. She has seen Dr. Ozzie MD, for ENT consultation for otitis media, and she does not believe she underwent a scan of her parotid gland.  She denies changes in her speech, diplopia, or dyspnea in the past 6 months. She does note that she has macular degeneration. She has dry mouth rarely when she wakes up in the morning. She started receiving Evenity injections for bone density in 09/2022, and she denies side effects.     Follow-up: 10/20/2023: She is in clinic for regular follow-up.  Since her last visit in June 2023, she was recommended to get sialogram but this test is not available within the [facility].  She reports that she has seen ENT and no abnormalities were found on ENT evaluation.  She has completed myasthenia gravis antibody panel which was negative for myasthenia as well.  She continues to have excessive saliva secretion and difficulty with swallowing.  This usually becomes worse as the  day progresses.  She has to clear saliva throughout the day which he is the most bothersome part.    The following portions of the patient's history were reviewed and updated as of 10/20/2023: allergies, social history, and problem list.       Current Outpatient Medications:     Calcium-Magnesium-Vitamin D - MG-MG-UNIT tablet sustained-release 24 hour, Take 1 tablet by mouth Daily., Disp: , Rfl:     celecoxib (CeleBREX) 100 MG capsule, Take 1 capsule by mouth Daily As Needed., Disp: , Rfl:     dexlansoprazole (DEXILANT) 60 MG capsule, dexlansoprazole 60 mg capsule,biphase delayed release, Disp: , Rfl:     doxycycline (VIBRAMYCIN) 50 MG capsule, Take 1 capsule by mouth Daily. Dry eyes, Disp: , Rfl:     fluticasone (FLONASE) 50 MCG/ACT nasal spray, 2 sprays into the nostril(s) as directed by provider Daily., Disp: , Rfl:     fluvastatin XL (LESCOL XL) 80 MG 24 hr tablet, TAKE ONE TABLET BY MOUTH EVERY NIGHT AT BEDTIME, Disp: 90 tablet, Rfl: 3    gentamicin (GARAMYCIN) 0.3 % ophthalmic solution, INSTILL 1 DROP INTO RIGHT EYE TWICE DAILY, Disp: , Rfl:     lisinopril (PRINIVIL,ZESTRIL) 20 MG tablet, Take 1 tablet by mouth Daily., Disp: 90 tablet, Rfl: 3    methocarbamol (ROBAXIN) 750 MG tablet, As Needed., Disp: , Rfl:     metoprolol succinate XL (TOPROL-XL) 25 MG 24 hr tablet, Take 1 tablet by mouth Daily., Disp: 90 tablet, Rfl: 3    metoprolol tartrate (LOPRESSOR) 25 MG tablet, , Disp: , Rfl:     oxyCODONE-acetaminophen (PERCOCET) 5-325 MG per tablet, i p.o. Q 4-6 hrs, prn, pain, max 4 per day, Disp: , Rfl:     Phytonadione (MEPHYTON PO), , Disp: , Rfl:     Probiotic Product (PROBIOTIC DAILY PO), Take 1 tablet by mouth Daily., Disp: , Rfl:     RESTASIS 0.05 % ophthalmic emulsion, Administer 1 drop to both eyes 2 (Two) Times a Day., Disp: , Rfl: 3    temazepam (RESTORIL) 15 MG capsule, Take 1 capsule by mouth Every Night., Disp: 90 capsule, Rfl: 0    traZODone (DESYREL) 100 MG tablet, Take 1 tablet by mouth  Every Night., Disp: 90 tablet, Rfl: 0   Past Medical History:   Diagnosis Date    Alopecia 2019    Chronic constipation 1972    Coronary artery disease 1955?    Tachycardia (controlled)    COVID-19 virus infection 05/14/2022    Essential hypertension 2018    Hyperlipidemia LDL goal <100 2015    Low back pain 2021    Melanoma (HCC) of left shoulder 02/2022    LN's were (-)    Monoclonal gammopathy 2006    Osteopenia of multiple sites 2014    Sinus tachycardia 1963    Stress fracture of left foot 2006    Stress fracture of right ankle 2000    Vision loss 1960?    Near sighted & 2000 macular degeneration      Past Surgical History:   Procedure Laterality Date    BREAST EXCISIONAL BIOPSY Left 1980    CATARACT EXTRACTION W/ INTRAOCULAR LENS  IMPLANT, BILATERAL Right 2006    CATARACT EXTRACTION W/ INTRAOCULAR LENS  IMPLANT, BILATERAL Left 2007    ENDOSCOPY N/A 12/23/2019    Procedure: ESOPHAGOGASTRODUODENOSCOPY WITH DILATATION;  Surgeon: Brunner, Mark I, MD;  Location:  [facility];  Service: Gastroenterology    ENDOSCOPY N/A 01/28/2020    Procedure: ESOPHAGOGASTRODUODENOSCOPY WITH DILITATION;  Surgeon: Brunner, Mark I, MD;  Location:  [facility];  Service: Gastroenterology    ENDOSCOPY WITH FOREIGN BODY REMOVAL N/A 12/01/2019    Procedure: ESOPHAGOGASTRODUODENOSCOPY WITH FOREIGN BODY REMOVAL;  Surgeon: Brunner, Mark I, MD;  Location:  [facility];  Service: Gastroenterology    LAPAROSCOPIC APPENDECTOMY  11/2007    LUMBAR DISCECTOMY  07/2011    minimally invasive lumbar decompression    PARTIAL KNEE ARTHROPLASTY Left 04/2018    PARTIAL KNEE ARTHROPLASTY Right 06/2017    SKIN CANCER EXCISION  02/2014    Melanoma on left shoulder    TOTAL ABDOMINAL HYSTERECTOMY WITH SALPINGO OOPHORECTOMY Bilateral 1996    Dr. Page - done for bleeding      Family History   Problem Relation Age of Onset    Breast cancer Maternal Aunt 64    Breast cancer Paternal Cousin 60    Breast cancer Maternal Cousin 65    Ovarian cancer  Neg Hx         Review of Systems  Objective:    /70   Pulse 56   LMP  (LMP Unknown)   SpO2 100%     Neurology Exam:  General apperance: NAD.     Mental status: Alert, awake and oriented to time place and person.    Language and Speech: No aphasia or dysarthria.    CN II to XII: Intact.    Opthalmoscopic Exam: No papilledema.    Motor:  Right UE muscle strength 5/5. Normal tone.     Left UE muscle strength 5/5. Normal tone.      Right LE muscle strength 5/5. Normal tone.     Left LE muscle strength 5/5. Normal tone.      Sensory: Normal light touch, vibration and pinprick sensation bilaterally.    DTRs: 2+ bilaterally.    Babinski: Negative bilaterally.    Co-ordination: Normal finger-to-nose, heel to shin B/L.    Rhomberg: Negative.    Gait: Normal.    Cardiovascular: Regular rate and rhythm without murmur, gallop or rub.    Assessment and Plan:  1. Hypersecretion of saliva  2. Dysphagia, oropharyngeal  Unknown etiology.  ENT evaluation was negative.  Myasthenia gravis antibody panel was negative as well.  She continues to have hypersecretion of saliva which has caused difficulty with swallowing.  I am going to start her on glycopyrrolate 1 mg in the morning and see how she does.  Have advised her to call back with response in 2 weeks or so and based on response, further dose adjustment will be made.  Common side effects with glycopyrrolate use including urinary retention, dryness of mouth and dryness of eyes was explained to the patient.  Otherwise I will see her back in clinic in 3 months for follow-up.       I spent 30 minutes in patient care: Reviewing records prior to the visit, entering orders and documentation and spent more than clark 50% of this time face-to-face in management, instructions and education regarding above mentioned diagnosis and also on counseling and discussing about taking medication regularly, possible side effects with medication use, importance of good sleep hygiene, good  hydration and regular exercise.    Return in about 3 months (around 1/20/2024).       Note to patient: The 21st Century Cures Act makes medical notes like these available to patients in the interest of transparency. However, be advised this is a medical document. It is intended as peer to peer communication. It is written in medical language and may contain abbreviations or verbiage that are unfamiliar. It may appear blunt or direct. Medical documents are intended to carry relevant information, facts as evident, and the clinical opinion of the physician.

## 2023-10-30 ENCOUNTER — TELEPHONE (OUTPATIENT)
Dept: FAMILY MEDICINE CLINIC | Facility: CLINIC | Age: 78
End: 2023-10-30
Payer: MEDICARE

## 2023-10-30 DIAGNOSIS — F51.04 PSYCHOPHYSIOLOGICAL INSOMNIA: ICD-10-CM

## 2023-10-30 RX ORDER — TRAZODONE HYDROCHLORIDE 100 MG/1
100 TABLET ORAL NIGHTLY
Qty: 90 TABLET | Refills: 0 | Status: SHIPPED | OUTPATIENT
Start: 2023-10-30

## 2023-10-30 NOTE — TELEPHONE ENCOUNTER
Caller: Holly Davis    Relationship: Self    Best call back number: [phone]    Requested Prescriptions:   Requested Prescriptions      No prescriptions requested or ordered in this encounter      traZODone (DESYREL) 100 MG tablet     Pharmacy where request should be sent:      [facility] [id number] - [place], [place] - [address] AT [facility] [address] - [phone]  - [phone] FX      Last office visit with prescribing clinician: 9/27/2023   Last telemedicine visit with prescribing clinician: Visit date not found   Next office visit with prescribing clinician: 12/26/2023     Does the patient have less than a 3 day supply:  [x] Yes  [] No    Would you like a call back once the refill request has been completed: [] Yes [x] No    If the office needs to give you a call back, can they leave a voicemail: [] Yes [x] No    Hortencia Schofield, PCT   10/30/23 14:35 EDT

## 2023-11-02 ENCOUNTER — HOSPITAL ENCOUNTER (OUTPATIENT)
Dept: MAMMOGRAPHY | Facility: HOSPITAL | Age: 78
Discharge: HOME | End: 2023-11-02
Admitting: OBSTETRICS & GYNECOLOGY
Payer: MEDICARE

## 2023-11-02 DIAGNOSIS — Z12.31 VISIT FOR SCREENING MAMMOGRAM: ICD-10-CM

## 2023-11-02 PROCEDURE — 77063 BREAST TOMOSYNTHESIS BI: CPT

## 2023-11-02 PROCEDURE — 77067 SCR MAMMO BI INCL CAD: CPT

## 2023-11-08 ENCOUNTER — LAB (OUTPATIENT)
Dept: LAB | Facility: HOSPITAL | Age: 78
End: 2023-11-08
Payer: MEDICARE

## 2023-11-08 DIAGNOSIS — D47.2 MONOCLONAL GAMMOPATHY: ICD-10-CM

## 2023-11-08 LAB
ALBUMIN SERPL-MCNC: 4 G/DL (ref 3.5–5.2)
ALBUMIN/GLOB SERPL: 1.7 G/DL
ALP SERPL-CCNC: 105 U/L (ref 39–117)
ALT SERPL W P-5'-P-CCNC: 20 U/L (ref 1–33)
ANION GAP SERPL CALCULATED.3IONS-SCNC: 9 MMOL/L (ref 5–15)
AST SERPL-CCNC: 15 U/L (ref 1–32)
BASOPHILS # BLD AUTO: 0.01 10*3/MM3 (ref 0–0.2)
BASOPHILS NFR BLD AUTO: 0.2 % (ref 0–1.5)
BILIRUB SERPL-MCNC: 0.7 MG/DL (ref 0–1.2)
BUN SERPL-MCNC: 24 MG/DL (ref 8–23)
BUN/CREAT SERPL: 38.1 (ref 7–25)
CALCIUM SPEC-SCNC: 8.4 MG/DL (ref 8.6–10.5)
CHLORIDE SERPL-SCNC: 105 MMOL/L (ref 98–107)
CO2 SERPL-SCNC: 26 MMOL/L (ref 22–29)
CREAT SERPL-MCNC: 0.63 MG/DL (ref 0.57–1)
DEPRECATED RDW RBC AUTO: 48 FL (ref 37–54)
EGFRCR SERPLBLD CKD-EPI 2021: 90.4 ML/MIN/1.73
EOSINOPHIL # BLD AUTO: 0.01 10*3/MM3 (ref 0–0.4)
EOSINOPHIL NFR BLD AUTO: 0.2 % (ref 0.3–6.2)
ERYTHROCYTE [DISTWIDTH] IN BLOOD BY AUTOMATED COUNT: 15.1 % (ref 12.3–15.4)
GLOBULIN UR ELPH-MCNC: 2.3 GM/DL
GLUCOSE SERPL-MCNC: 88 MG/DL (ref 65–99)
HCT VFR BLD AUTO: 37.4 % (ref 34–46.6)
HGB BLD-MCNC: 11.8 G/DL (ref 12–15.9)
IMM GRANULOCYTES # BLD AUTO: 0.04 10*3/MM3 (ref 0–0.05)
IMM GRANULOCYTES NFR BLD AUTO: 0.7 % (ref 0–0.5)
LYMPHOCYTES # BLD AUTO: 2.03 10*3/MM3 (ref 0.7–3.1)
LYMPHOCYTES NFR BLD AUTO: 33.2 % (ref 19.6–45.3)
MCH RBC QN AUTO: 27.4 PG (ref 26.6–33)
MCHC RBC AUTO-ENTMCNC: 31.6 G/DL (ref 31.5–35.7)
MCV RBC AUTO: 86.8 FL (ref 79–97)
MONOCYTES # BLD AUTO: 0.83 10*3/MM3 (ref 0.1–0.9)
MONOCYTES NFR BLD AUTO: 13.6 % (ref 5–12)
NEUTROPHILS NFR BLD AUTO: 3.19 10*3/MM3 (ref 1.7–7)
NEUTROPHILS NFR BLD AUTO: 52.1 % (ref 42.7–76)
NRBC BLD AUTO-RTO: 0 /100 WBC (ref 0–0.2)
PLATELET # BLD AUTO: 230 10*3/MM3 (ref 140–450)
PMV BLD AUTO: 10.8 FL (ref 6–12)
POTASSIUM SERPL-SCNC: 4.3 MMOL/L (ref 3.5–5.2)
PROT SERPL-MCNC: 6.3 G/DL (ref 6–8.5)
RBC # BLD AUTO: 4.31 10*6/MM3 (ref 3.77–5.28)
SODIUM SERPL-SCNC: 140 MMOL/L (ref 136–145)
WBC NRBC COR # BLD: 6.11 10*3/MM3 (ref 3.4–10.8)

## 2023-11-08 PROCEDURE — 84165 PROTEIN E-PHORESIS SERUM: CPT

## 2023-11-08 PROCEDURE — 82784 ASSAY IGA/IGD/IGG/IGM EACH: CPT

## 2023-11-08 PROCEDURE — 86334 IMMUNOFIX E-PHORESIS SERUM: CPT

## 2023-11-08 PROCEDURE — 85025 COMPLETE CBC W/AUTO DIFF WBC: CPT

## 2023-11-08 PROCEDURE — 83521 IG LIGHT CHAINS FREE EACH: CPT

## 2023-11-08 PROCEDURE — 36415 COLL VENOUS BLD VENIPUNCTURE: CPT

## 2023-11-08 PROCEDURE — 80053 COMPREHEN METABOLIC PANEL: CPT

## 2023-11-09 LAB
KAPPA LC FREE SER-MCNC: 8.3 MG/L (ref 3.3–19.4)
KAPPA LC FREE/LAMBDA FREE SER: 0.45 {RATIO} (ref 0.26–1.65)
LAMBDA LC FREE SERPL-MCNC: 18.6 MG/L (ref 5.7–26.3)

## 2023-11-10 LAB
ALBUMIN SERPL ELPH-MCNC: 3.6 G/DL (ref 2.9–4.4)
ALBUMIN/GLOB SERPL: 1.5 {RATIO} (ref 0.7–1.7)
ALPHA1 GLOB SERPL ELPH-MCNC: 0.2 G/DL (ref 0–0.4)
ALPHA2 GLOB SERPL ELPH-MCNC: 0.8 G/DL (ref 0.4–1)
B-GLOBULIN SERPL ELPH-MCNC: 0.8 G/DL (ref 0.7–1.3)
GAMMA GLOB SERPL ELPH-MCNC: 0.7 G/DL (ref 0.4–1.8)
GLOBULIN SER-MCNC: 2.5 G/DL (ref 2.2–3.9)
IGA SERPL-MCNC: 51 MG/DL (ref 64–422)
IGG SERPL-MCNC: 834 MG/DL (ref 586–1602)
IGM SERPL-MCNC: 96 MG/DL (ref 26–217)
INTERPRETATION SERPL IEP-IMP: ABNORMAL
LABORATORY COMMENT REPORT: ABNORMAL
M PROTEIN SERPL ELPH-MCNC: 0.5 G/DL
PROT SERPL-MCNC: 6.1 G/DL (ref 6–8.5)

## 2023-11-16 ENCOUNTER — OFFICE VISIT (OUTPATIENT)
Dept: ONCOLOGY | Facility: CLINIC | Age: 78
End: 2023-11-16
Payer: MEDICARE

## 2023-11-16 VITALS
OXYGEN SATURATION: 100 % | HEIGHT: 64 IN | SYSTOLIC BLOOD PRESSURE: 140 MMHG | RESPIRATION RATE: 16 BRPM | HEART RATE: 68 BPM | DIASTOLIC BLOOD PRESSURE: 68 MMHG | TEMPERATURE: 97.6 F | BODY MASS INDEX: 18.44 KG/M2 | WEIGHT: 108 LBS

## 2023-11-16 DIAGNOSIS — D47.2 MONOCLONAL GAMMOPATHY: Primary | ICD-10-CM

## 2023-11-16 PROCEDURE — 3077F SYST BP >= 140 MM HG: CPT | Performed by: INTERNAL MEDICINE

## 2023-11-16 PROCEDURE — 1126F AMNT PAIN NOTED NONE PRSNT: CPT | Performed by: INTERNAL MEDICINE

## 2023-11-16 PROCEDURE — 3078F DIAST BP <80 MM HG: CPT | Performed by: INTERNAL MEDICINE

## 2023-11-16 PROCEDURE — 99213 OFFICE O/P EST LOW 20 MIN: CPT | Performed by: INTERNAL MEDICINE

## 2023-11-16 NOTE — PROGRESS NOTES
"  PROBLEM LIST:  1. 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  2. osteoprosis  3. Tachycardia  4. hyperlipidemia    Subjective      Cc: mgus    HISTORY OF PRESENT ILLNESS:   Holly Davis returns for follow-up.  She has been feeling okay.  She has been reading online and has been concerned that her she has stage I myeloma.  No new symptoms or health concerns.      Objective      /68   Pulse 68   Temp 97.6 °F (36.4 °C)   Resp 16   Ht 162.6 cm (64\")   Wt 49 kg (108 lb)   LMP  (LMP Unknown)   SpO2 100%   BMI 18.54 kg/m²      Performance Status: 0    General: well appearing female in no acute distress  Neuro: alert and oriented  HEENT: sclera anicteric  Skin: no rashes, lesions, bruising, or petechiae  Psych: mood and affect appropriate      RECENT LABS:  Lab Results   Component Value Date    WBC 6.11 11/08/2023    HGB 11.8 (L) 11/08/2023    HCT 37.4 11/08/2023    MCV 86.8 11/08/2023     11/08/2023     Lab Results   Component Value Date    GLUCOSE 88 11/08/2023    BUN 24 (H) 11/08/2023    CREATININE 0.63 11/08/2023    EGFRIFNONA 60 (L) 11/15/2021    EGFRIFAFRI 95 04/19/2021    BCR 38.1 (H) 11/08/2023    K 4.3 11/08/2023    CO2 26.0 11/08/2023    CALCIUM 8.4 (L) 11/08/2023    PROTENTOTREF 6.1 11/08/2023    ALBUMIN 4.0 11/08/2023    ALBUMIN 3.6 11/08/2023    LABIL2 1.5 11/08/2023    AST 15 11/08/2023    ALT 20 11/08/2023            Component  Ref Range & Units 8 d ago   Free Light Chain, Kappa  3.3 - 19.4 mg/L 8.3   Free Lambda Light Chains  5.7 - 26.3 mg/L 18.6   Kappa/Lambda Ratio  0.26 - 1.65 0.45          0.7  0.8  1.0      M-Albino  Not Observed g/dL 0.5 High   0.5 High   0.7 High      Globulin  2.2 - 3.9 g/dL 2.5  2.5  2.7  2.4 R   A/G Ratio  0.7 - 1.7 1.5 1.7 R 1.5 1.9 R 1.3 1.5 R 1.7 R   Immunofixation Reflex, Serum Comment Abnormal   Comment Abnormal  CM  Comment Abnormal  CM   "   Comment: Immunofixation shows IgG monoclonal protein with lambda light chain  specificity.     Assessment & Plan   Holly Ott Clanton is a 77 y.o. 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  Her M spike remains stable at 0.5.  Low risk for progression.  It is reasonable to continue to monitor the to this as long as she remains in otherwise good health.    Follow-up in 1 year.                        Nazia Monreal MD  [facility]    11/16/2023          CC:

## 2023-12-19 PROBLEM — R05.1 ACUTE COUGH: Status: RESOLVED | Noted: 2023-08-12 | Resolved: 2023-12-19

## 2023-12-19 NOTE — PROGRESS NOTES
Subjective   Chief Complaint   Patient presents with    Gynecologic Exam     Holly Davis is a 78 y.o. year old  Medicare patient presenting to be seen in follow up regarding her osteoporosis and chronic constipation with pain.  She continues to see a orthopedist in [place] who specializes in bone loss.  She had been on Evenity but is not currently taking it.  She did have some lab work done recently showing a calcium of 8.6.  She is currently taking Prolia.  Her MGUS is stable.  She remains on compounded estrogen creams several times per week and does not need refills on that.    Previously TDAP vaccination and shingles vaccination were recommended.  She did take her first shingles vaccination and had a pretty good local reaction to that.  Still needs to get her tetanus booster.    This past year she has not been on hormone replacement therapy.  She has not had any vaginal bleeding in the last 12 months.   Menopausal symptoms are not present.    SEXUAL Hx:  She is currently sexually active.  In the past year there there has been NO new sexual partners.    Condoms are never used.  She would not like to be screened for STD's at today's exam.  [place] is painful: no  HEALTH Hx:  She exercises regularly: yes.  She wears her seat belt: yes.  She has concerns about domestic violence: no.  She has noticed changes in height: no    The following portions of the patient's history were reviewed and updated as appropriate:problem list, current medications, allergies, past family history, past medical history, past social history, and past surgical history.    Social History    Tobacco Use      Smoking status: Never      Smokeless tobacco: Never    Review of Systems  Constitutional POS: nothing reported    NEG: anorexia or night sweats   Genitourinary POS: nothing reported    NEG: dysuria or hematuria   Gastointestinal POS: nothing reported    NEG: bloating, change in bowel habits, melena, or reflux  symptoms   Integument POS: nothing reported and she sees her dermatologist for routine skins exams    NEG: moles that are changing in size, shape, color or rashes   Breast POS: nothing reported    NEG: persistent breast lump, skin dimpling, or nipple discharge        Objective   /72   Resp 14   Wt 50.3 kg (111 lb)   LMP  (LMP Unknown)   BMI 19.05 kg/m²     General:  well developed; well nourished  no acute distress   Skin:  No suspicious lesions seen   Thyroid: normal to inspection and palpation   Breasts:  Examined in supine position  Symmetric without masses or skin dimpling  Nipples normal without inversion, lesions or discharge  There are no palpable axillary nodes   Abdomen: soft, non-tender; no masses  no umbilical or inguinal hernias are present  no hepato-splenomegaly   Pelvis: Clinical staff was present for exam  External genitalia:  normal appearance of the external genitalia including Bartholin's and [place]'s glands.  :  urethral meatus normal;  Vaginal:  normal pink mucosa without prolapse or lesions.  Cervix:  absent.  Uterus:  absent.  Adnexa:  absent, bilateral.  Rectal:  digital rectal exam not performed; anus visually normal appearing.        Assessment   Osteoporosis currently on Prolia and followed by outside MD  MGUS followed with hematology/oncology - ASHA and followed by Rah  Chronic constiption -overall stable with medication  Normal GYN exam S/P JANETTE w/ BSO  Menopausal female currently not on HRT - without significant symptoms affecting activities of daily living  She is up to date on all relevant gynecologic and colorectal screenings       Plan   Pap was not done today.  I explained to Holly that the Pap smears are no longer recommended in patient's after hysterectomy.   I stressed to Holly that she still should be seen to be seen yearly for a full physical including breast and pelvic exam.  Regarding breast cancer screening by yearly mammogram in women aged 75 years of age and  older, I reviewed the USPSTF's recommendations.  The USPSTF concludes that the current evidence is insufficient to assess the balance of benefits and harms of screening mammography in women aged 75 years or older.  After hearing this information she is interested in continuing with yearly mammograms.  Regardless of her decision to continue to participate in screening, she should report any palpable breast lump(s) or skin changes regardless of mammographic findings.  If she chooses to continue with breast cancer screening, I explained to Holly that notification regarding her mammogram results will come from the center performing the study.  Our office will not be routinely calling with mammogram results.  It is her responsibility to make sure that the results from the mammogram are communicated to her by the breast center.  If she has any questions about the results, she is welcome to call our office anytime.  Regarding colonoscopy and colon cancer screening after 75, I reviewed the data from the USPSTF.  The USPSTF recommends that clinicians selectively offer screening for colorectal cancer by colonoscopy in adults aged 76 to 85 years. Evidence indicates that the net benefit of screening all persons in this age group is small. In determining whether this service is appropriate in individual cases, patients and clinicians should consider the patient's overall health, prior screening history, and  preferences.  After hearing this information she is undecided about getting a colonoscopy.   Her vaccine record was reviewed and updated.  I discussed with Holly that she may be behind on needed vaccinations for TDAP and Shingles [Shingrix].  She may be able to obtain these vaccinations at her local pharmacy OR speak about obtaining them with her primary care.  If she does obtain her vaccines, I have asked Holly to let us know the date each vaccine was obtained so that her medical record could be updated in our system.  The  importance of keeping all planned follow-up and taking all medications as prescribed was emphasized.  Follow up for annual exam 1 year           This note was electronically signed.    Adama Escobar M.D.  December 28, 2023    Part of this note may be an electronic transcription/translation of spoken language to printed text using the Dragon Dictation System.

## 2023-12-27 ENCOUNTER — TELEPHONE (OUTPATIENT)
Dept: FAMILY MEDICINE CLINIC | Facility: CLINIC | Age: 78
End: 2023-12-27
Payer: MEDICARE

## 2023-12-27 DIAGNOSIS — F51.04 PSYCHOPHYSIOLOGICAL INSOMNIA: ICD-10-CM

## 2023-12-27 RX ORDER — TEMAZEPAM 15 MG/1
15 CAPSULE ORAL NIGHTLY
Qty: 9 CAPSULE | Refills: 0 | Status: SHIPPED | OUTPATIENT
Start: 2023-12-27

## 2023-12-27 NOTE — TELEPHONE ENCOUNTER
PT STATES THAT [place] AND NEEDS A MED REFILL. SHE HAS A APPT WITH DR CRSOS ON 01/08/23 AND JUST NEEDS ENOUGH UNTIL THEN , SHE IS A [facility] PT.     TEMAZEPAM 15 MG.

## 2023-12-28 ENCOUNTER — OFFICE VISIT (OUTPATIENT)
Dept: OBSTETRICS AND GYNECOLOGY | Facility: CLINIC | Age: 78
End: 2023-12-28
Payer: MEDICARE

## 2023-12-28 VITALS
DIASTOLIC BLOOD PRESSURE: 72 MMHG | BODY MASS INDEX: 19.05 KG/M2 | WEIGHT: 111 LBS | RESPIRATION RATE: 14 BRPM | SYSTOLIC BLOOD PRESSURE: 138 MMHG

## 2023-12-28 DIAGNOSIS — K59.09 CHRONIC CONSTIPATION: ICD-10-CM

## 2023-12-28 DIAGNOSIS — Z71.85 VACCINE COUNSELING: ICD-10-CM

## 2023-12-28 DIAGNOSIS — M81.0 AGE-RELATED OSTEOPOROSIS WITHOUT CURRENT PATHOLOGICAL FRACTURE: Primary | ICD-10-CM

## 2023-12-28 PROCEDURE — 99213 OFFICE O/P EST LOW 20 MIN: CPT | Performed by: OBSTETRICS & GYNECOLOGY

## 2023-12-28 PROCEDURE — 3078F DIAST BP <80 MM HG: CPT | Performed by: OBSTETRICS & GYNECOLOGY

## 2023-12-28 PROCEDURE — 3075F SYST BP GE 130 - 139MM HG: CPT | Performed by: OBSTETRICS & GYNECOLOGY

## 2023-12-28 RX ORDER — DIAZEPAM 10 MG/1
TABLET ORAL
COMMUNITY
Start: 2023-12-26

## 2023-12-28 RX ORDER — AMOXICILLIN 500 MG/1
CAPSULE ORAL
COMMUNITY
Start: 2023-12-08

## 2023-12-28 RX ORDER — DENOSUMAB 60 MG/ML
INJECTION SUBCUTANEOUS ONCE
COMMUNITY

## 2024-01-08 ENCOUNTER — OFFICE VISIT (OUTPATIENT)
Dept: FAMILY MEDICINE CLINIC | Facility: CLINIC | Age: 79
End: 2024-01-08
Payer: MEDICARE

## 2024-01-08 VITALS
HEIGHT: 64 IN | OXYGEN SATURATION: 99 % | SYSTOLIC BLOOD PRESSURE: 146 MMHG | DIASTOLIC BLOOD PRESSURE: 84 MMHG | BODY MASS INDEX: 18.63 KG/M2 | WEIGHT: 109.1 LBS | HEART RATE: 82 BPM

## 2024-01-08 DIAGNOSIS — F51.04 PSYCHOPHYSIOLOGICAL INSOMNIA: Primary | ICD-10-CM

## 2024-01-08 DIAGNOSIS — Z51.81 MEDICATION MONITORING ENCOUNTER: ICD-10-CM

## 2024-01-08 LAB
AMPHET+METHAMPHET UR QL: NEGATIVE
AMPHETAMINE INTERNAL CONTROL: ABNORMAL
AMPHETAMINES UR QL: NEGATIVE
BARBITURATE INTERNAL CONTROL: ABNORMAL
BARBITURATES UR QL SCN: NEGATIVE
BENZODIAZ UR QL SCN: POSITIVE
BENZODIAZEPINE INTERNAL CONTROL: ABNORMAL
BUPRENORPHINE INTERNAL CONTROL: ABNORMAL
BUPRENORPHINE SERPL-MCNC: NEGATIVE NG/ML
CANNABINOIDS SERPL QL: NEGATIVE
COCAINE INTERNAL CONTROL: ABNORMAL
COCAINE UR QL: NEGATIVE
EXPIRATION DATE: ABNORMAL
Lab: ABNORMAL
MDMA (ECSTASY) INTERNAL CONTROL: ABNORMAL
MDMA UR QL SCN: NEGATIVE
METHADONE INTERNAL CONTROL: ABNORMAL
METHADONE UR QL SCN: NEGATIVE
METHAMPHETAMINE INTERNAL CONTROL: ABNORMAL
MORPHINE INTERNAL CONTROL: ABNORMAL
MORPHINE/OPIATES SCREEN, URINE: NEGATIVE
OXYCODONE INTERNAL CONTROL: ABNORMAL
OXYCODONE UR QL SCN: NEGATIVE
PCP UR QL SCN: NEGATIVE
PHENCYCLIDINE INTERNAL CONTROL: ABNORMAL
THC INTERNAL CONTROL: ABNORMAL

## 2024-01-08 PROCEDURE — 80305 DRUG TEST PRSMV DIR OPT OBS: CPT | Performed by: FAMILY MEDICINE

## 2024-01-08 PROCEDURE — 3079F DIAST BP 80-89 MM HG: CPT | Performed by: FAMILY MEDICINE

## 2024-01-08 PROCEDURE — 3077F SYST BP >= 140 MM HG: CPT | Performed by: FAMILY MEDICINE

## 2024-01-08 PROCEDURE — 99213 OFFICE O/P EST LOW 20 MIN: CPT | Performed by: FAMILY MEDICINE

## 2024-01-08 RX ORDER — OMEPRAZOLE 20 MG/1
20 CAPSULE, DELAYED RELEASE ORAL DAILY
COMMUNITY
End: 2024-01-08

## 2024-01-08 RX ORDER — OMEPRAZOLE 20 MG/1
20 CAPSULE, DELAYED RELEASE ORAL DAILY
Qty: 90 CAPSULE | Refills: 1 | Status: CANCELLED | OUTPATIENT
Start: 2024-01-08

## 2024-01-08 RX ORDER — OMEPRAZOLE 40 MG/1
40 CAPSULE, DELAYED RELEASE ORAL DAILY
Qty: 90 CAPSULE | Refills: 1 | Status: SHIPPED | OUTPATIENT
Start: 2024-01-08

## 2024-01-08 RX ORDER — TEMAZEPAM 15 MG/1
15 CAPSULE ORAL NIGHTLY
Qty: 90 CAPSULE | Refills: 1 | Status: SHIPPED | OUTPATIENT
Start: 2024-01-08

## 2024-01-08 NOTE — PROGRESS NOTES
Date: 2024   Patient Name: Holly Davis  : 1945   MRN: [id number]     Chief Complaint:    Chief Complaint   Patient presents with    Hypertension    Insomnia     Med refill        History of Present Illness: Holly Davis is a 78 y.o. female who is here today for refill of temazepam.  She reports this continues to work well without side effects and has been stable on current dosing for more than 1 year.         Review of Systems:   Review of Systems   Constitutional:  Negative for chills, fatigue and fever.   Respiratory:  Negative for cough and shortness of breath.    Cardiovascular:  Negative for chest pain and palpitations.   Gastrointestinal:  Negative for abdominal pain, constipation, diarrhea, nausea and vomiting.   Musculoskeletal:  Negative for back pain and myalgias.   Neurological:  Negative for dizziness and headache.   Psychiatric/Behavioral:  Negative for depressed mood. The patient is not nervous/anxious.        Past Medical History:   Past Medical History:   Diagnosis Date    Allergic     Versed    Alopecia 2019    Cataract     Chronic constipation 1972    Coronary artery disease ?    Tachycardia (controlled)    COVID-19 virus infection 2022    Essential hypertension 2018    HL (hearing loss)     Prescription for heating aids    Hyperlipidemia LDL goal <100 2015    Low back pain     Melanoma (HCC) of left shoulder 2022    LN's were (-)    Monoclonal gammopathy 2006    Osteopenia of multiple sites     Sinus tachycardia 1963    Stress fracture of left foot 2006    Stress fracture of right ankle 2000    Visual impairment        Past Surgical History:   Past Surgical History:   Procedure Laterality Date    APPENDECTOMY  2007    BREAST EXCISIONAL BIOPSY Left     CATARACT EXTRACTION W/ INTRAOCULAR LENS  IMPLANT, BILATERAL Right 2006    CATARACT EXTRACTION W/ INTRAOCULAR LENS  IMPLANT, BILATERAL Left     COLONOSCOPY  2022     ENDOSCOPY N/A 12/23/2019    Procedure: ESOPHAGOGASTRODUODENOSCOPY WITH DILATATION;  Surgeon: Brunner, Mark I, MD;  Location:  [facility];  Service: Gastroenterology    ENDOSCOPY N/A 01/28/2020    Procedure: ESOPHAGOGASTRODUODENOSCOPY WITH DILITATION;  Surgeon: Brunner, Mark I, MD;  Location:  [facility];  Service: Gastroenterology    ENDOSCOPY WITH FOREIGN BODY REMOVAL N/A 12/01/2019    Procedure: ESOPHAGOGASTRODUODENOSCOPY WITH FOREIGN BODY REMOVAL;  Surgeon: Brunner, Mark I, MD;  Location:  [facility];  Service: Gastroenterology    EYE SURGERY  2006 & 2007    Cataract surgery    JOINT REPLACEMENT  2017& 2018    Partial knee replacement-left and right    LAPAROSCOPIC APPENDECTOMY  11/2007    LUMBAR DISCECTOMY  07/2011    minimally invasive lumbar decompression    PARTIAL KNEE ARTHROPLASTY Left 04/2018    PARTIAL KNEE ARTHROPLASTY Right 06/2017    SKIN CANCER EXCISION  02/2014    Melanoma on left shoulder    TOTAL ABDOMINAL HYSTERECTOMY WITH SALPINGO OOPHORECTOMY Bilateral 1996    Dr. Page - done for bleeding       Family History:   Family History   Problem Relation Age of Onset    Arthritis Mother     Breast cancer Maternal Aunt 64    Breast cancer Paternal Cousin 60    Breast cancer Maternal Cousin 65    Ovarian cancer Neg Hx        Social History:   Social History     Socioeconomic History    Marital status:    Tobacco Use    Smoking status: Never     Passive exposure: Never    Smokeless tobacco: Never   Vaping Use    Vaping Use: Never used   Substance and Sexual Activity    Alcohol use: Yes     Alcohol/week: 5.0 standard drinks of alcohol     Types: 3 Glasses of wine, 2 Cans of beer per week     Comment: Social    Drug use: Never    Sexual activity: Yes     Partners: Male     Birth control/protection: Post-menopausal, Hysterectomy, Surgical       Medications:     Current Outpatient Medications:     amoxicillin (AMOXIL) 500 MG capsule, , Disp: , Rfl:     denosumab (Prolia) 60 MG/ML  "solution prefilled syringe syringe, Inject  under the skin into the appropriate area as directed 1 (One) Time., Disp: , Rfl:     doxycycline (VIBRAMYCIN) 50 MG capsule, Take 1 capsule by mouth Daily. Dry eyes, Disp: , Rfl:     fluticasone (FLONASE) 50 MCG/ACT nasal spray, 2 sprays into the nostril(s) as directed by provider Daily., Disp: , Rfl:     lisinopril (PRINIVIL,ZESTRIL) 20 MG tablet, Take 1 tablet by mouth Daily., Disp: 90 tablet, Rfl: 3    metoprolol succinate XL (TOPROL-XL) 25 MG 24 hr tablet, Take 1 tablet by mouth Daily., Disp: 90 tablet, Rfl: 3    Probiotic Product (PROBIOTIC DAILY PO), Take 1 tablet by mouth Daily., Disp: , Rfl:     RESTASIS 0.05 % ophthalmic emulsion, Administer 1 drop to both eyes 2 (Two) Times a Day., Disp: , Rfl: 3    temazepam (RESTORIL) 15 MG capsule, Take 1 capsule by mouth Every Night., Disp: 90 capsule, Rfl: 1    traZODone (DESYREL) 100 MG tablet, Take 1 tablet by mouth Every Night., Disp: 90 tablet, Rfl: 0    omeprazole (priLOSEC) 40 MG capsule, Take 1 capsule by mouth Daily., Disp: 90 capsule, Rfl: 1    Allergies:   Allergies   Allergen Reactions    2,4-D Dimethylamine Rash    Cefdinir Other (See Comments) and Unknown (See Comments)     \"OMNICEF (CEFDINIR) GAVE ME C-DIFF-SEVERAL YEARS AGO\"  Other reaction(s): Other (See Comments), Other (See Comments)  \"OMNICEF (CEFDINIR) GAVE ME C-DIFF-SEVERAL YEARS AGO\"  \"OMNICEF (CEFDINIR) GAVE ME C-DIFF-SEVERAL YEARS AGO\"    Other reaction(s): Other, Unknown  \"OMNICEF (CEFDINIR) GAVE ME C-DIFF-SEVERAL YEARS AGO\"  \"OMNICEF (CEFDINIR) GAVE ME C-DIFF-SEVERAL YEARS AGO\"  \"OMNICEF (CEFDINIR) GAVE ME C-DIFF-SEVERAL YEARS AGO\"      Midazolam Other (See Comments) and Unknown (See Comments)     \"LAST TIME TOOK VERSED,COULD NOT BREATHE,WAS GIVEN REVERSAL DRUG\"  Other reaction(s): Other (See Comments), Other (See Comments)  \"LAST TIME TOOK VERSED,COULD NOT BREATHE,WAS GIVEN REVERSAL DRUG\"  \"LAST TIME TOOK VERSED,COULD NOT BREATHE,WAS GIVEN REVERSAL " "DRUG\"    Other reaction(s): Other, Unknown  \"LAST TIME TOOK VERSED,COULD NOT BREATHE,WAS GIVEN REVERSAL DRUG\"  \"LAST TIME TOOK VERSED,COULD NOT BREATHE,WAS GIVEN REVERSAL DRUG\"  \"LAST TIME TOOK VERSED,COULD NOT BREATHE,WAS GIVEN REVERSAL DRUG\"      Other Hives     OMNI IV  OMNI IV  OMNI IV    Midazolam Hcl Hives    Sulfa Antibiotics Rash    Trimethoprim Unknown (See Comments), Rash and Hives     Other reaction(s): Unknown (See Comments)  Other reaction(s): Unknown (See Comments)  Other reaction(s): Unknown (See Comments)  Other reaction(s): Unknown (See Comments)  Other reaction(s): Unknown (See Comments)           Physical Exam:  Vital Signs:   Vitals:    01/08/24 1517   BP: 146/84   BP Location: Right arm   Patient Position: Sitting   Cuff Size: Adult   Pulse: 82   SpO2: 99%   Weight: 49.5 kg (109 lb 1.6 oz)   Height: 162.6 cm (64\")     Body mass index is 18.73 kg/m².     Physical Exam  Vitals and nursing note reviewed.   Constitutional:       Appearance: Normal appearance.   HENT:      Head: Normocephalic.   Pulmonary:      Effort: Pulmonary effort is normal.   Neurological:      Mental Status: She is alert and oriented to person, place, and time.   Psychiatric:         Mood and Affect: Mood normal.         Behavior: Behavior normal.           Assessment/Plan:   Diagnoses and all orders for this visit:    1. Psychophysiological insomnia (Primary)  Comments:  [place] refill medications  Assessment & Plan:  UDS and controlled substance agreement updated in office today.  [place] reviewed, temazepam refilled.  Follow-up in 6 months.  Adverse effects discussed.  Avoid alcohol, driving, or operating machinery while taking medications.      Orders:  -     temazepam (RESTORIL) 15 MG capsule; Take 1 capsule by mouth Every Night.  Dispense: 90 capsule; Refill: 1    2. Medication monitoring encounter  -     POC Medline 12 Panel Urine Drug Screen    Other orders  -     omeprazole (priLOSEC) 40 MG capsule; Take 1 capsule by " mouth Daily.  Dispense: 90 capsule; Refill: 1           Follow Up:   Return in about 6 months (around 7/8/2024) for Annual physical, Medicare Wellness.    Giovana Kruger DO  [facility]

## 2024-01-08 NOTE — ASSESSMENT & PLAN NOTE
UDS and controlled substance agreement updated in office today.  [place] reviewed, temazepam refilled.  Follow-up in 6 months.  Adverse effects discussed.  Avoid alcohol, driving, or operating machinery while taking medications.

## 2024-01-22 DIAGNOSIS — F51.04 PSYCHOPHYSIOLOGICAL INSOMNIA: ICD-10-CM

## 2024-01-22 RX ORDER — TRAZODONE HYDROCHLORIDE 100 MG/1
100 TABLET ORAL NIGHTLY
Qty: 90 TABLET | Refills: 0 | Status: SHIPPED | OUTPATIENT
Start: 2024-01-22

## 2024-04-03 DIAGNOSIS — F51.04 PSYCHOPHYSIOLOGICAL INSOMNIA: ICD-10-CM

## 2024-04-03 RX ORDER — TRAZODONE HYDROCHLORIDE 100 MG/1
100 TABLET ORAL NIGHTLY
Qty: 90 TABLET | Refills: 0 | Status: SHIPPED | OUTPATIENT
Start: 2024-04-03

## 2024-04-03 NOTE — TELEPHONE ENCOUNTER
Caller: SusanHolly    Relationship: Self    Best call back number: [phone]     Requested Prescriptions:   Requested Prescriptions     Pending Prescriptions Disp Refills    traZODone (DESYREL) 100 MG tablet 90 tablet 0     Sig: Take 1 tablet by mouth Every Night.        Pharmacy where request should be sent: [facility] [id number] [address] AT [facility] [address] - [phone]  - [phone] FX     Last office visit with prescribing clinician: 1/8/2024   Last telemedicine visit with prescribing clinician: Visit date not found   Next office visit with prescribing clinician: 7/9/2024     Additional details provided by patient: PATIENT WILL BE LEAVING [place] ON 4.16.24, AND WOULD LIKE TO HAVE THE MEDICATION REFILLED BEFORE THEN IF POSSIBLE        Does the patient have less than a 3 day supply:  [] Yes  [x] No    Would you like a call back once the refill request has been completed: [] Yes [x] No    If the office needs to give you a call back, can they leave a voicemail: [] Yes [x] No    Madalyn Rodriguez Rep   04/03/24 12:56 EDT

## 2024-04-18 DIAGNOSIS — F51.04 PSYCHOPHYSIOLOGICAL INSOMNIA: ICD-10-CM

## 2024-04-18 RX ORDER — TRAZODONE HYDROCHLORIDE 100 MG/1
100 TABLET ORAL NIGHTLY
Qty: 90 TABLET | Refills: 0 | Status: SHIPPED | OUTPATIENT
Start: 2024-04-18

## 2024-06-21 ENCOUNTER — OFFICE VISIT (OUTPATIENT)
Dept: FAMILY MEDICINE CLINIC | Facility: CLINIC | Age: 79
End: 2024-06-21
Payer: MEDICARE

## 2024-06-21 VITALS
HEART RATE: 80 BPM | DIASTOLIC BLOOD PRESSURE: 82 MMHG | SYSTOLIC BLOOD PRESSURE: 132 MMHG | HEIGHT: 64 IN | BODY MASS INDEX: 18.78 KG/M2 | WEIGHT: 110 LBS

## 2024-06-21 DIAGNOSIS — J30.2 SEASONAL ALLERGIC RHINITIS, UNSPECIFIED TRIGGER: Primary | ICD-10-CM

## 2024-06-21 PROCEDURE — 3075F SYST BP GE 130 - 139MM HG: CPT | Performed by: PHYSICIAN ASSISTANT

## 2024-06-21 PROCEDURE — 3079F DIAST BP 80-89 MM HG: CPT | Performed by: PHYSICIAN ASSISTANT

## 2024-06-21 PROCEDURE — 1126F AMNT PAIN NOTED NONE PRSNT: CPT | Performed by: PHYSICIAN ASSISTANT

## 2024-06-21 PROCEDURE — 99213 OFFICE O/P EST LOW 20 MIN: CPT | Performed by: PHYSICIAN ASSISTANT

## 2024-06-21 RX ORDER — AZELASTINE 1 MG/ML
2 SPRAY, METERED NASAL 2 TIMES DAILY
Qty: 30 ML | Refills: 2 | Status: SHIPPED | OUTPATIENT
Start: 2024-06-21

## 2024-06-21 RX ORDER — LEVOCETIRIZINE DIHYDROCHLORIDE 5 MG/1
5 TABLET, FILM COATED ORAL EVERY EVENING
Qty: 30 TABLET | Refills: 2 | Status: SHIPPED | OUTPATIENT
Start: 2024-06-21

## 2024-06-21 RX ORDER — CELECOXIB 100 MG/1
CAPSULE ORAL
COMMUNITY
Start: 2024-06-04

## 2024-06-21 NOTE — PROGRESS NOTES
Office Note     Name: Holly Davis    : 1945     MRN: [id number]     Chief Complaint  Nasal Congestion (Sneezing a lot also x 3 wks)    Subjective     History of Present Illness    Holly Davis is a 78 y.o. female who presents today for eval of rhinorrhea and sneezing for the last few weeks.  She states that she thought it would go away on its own, but it has persisted.  She denies any fever, cough, or shortness of breath. She has been taking Claritin and Fluticasone, but she just started taking those the last couple of days.     Review of Systems:   Review of Systems   Constitutional:  Negative for fever.   HENT:  Positive for rhinorrhea and sneezing. Negative for congestion, ear pain, postnasal drip, sinus pressure and sore throat.    Respiratory:  Negative for cough and shortness of breath.    Neurological:  Negative for headache.       Past Medical History:   Past Medical History:   Diagnosis Date    Allergic     Versed    Alopecia     Cataract     Chronic constipation 1972    Coronary artery disease ?    Tachycardia (controlled)    COVID-19 virus infection 2022    Essential hypertension 2018    HL (hearing loss)     Prescription for heating aids    Hyperlipidemia LDL goal <100 2015    Low back pain     Melanoma (HCC) of left shoulder 2022    LN's were (-)    Monoclonal gammopathy 2006    Osteopenia of multiple sites 2014    Sinus tachycardia 1963    Stress fracture of left foot 2006    Stress fracture of right ankle 2000    Visual impairment        Past Surgical History:   Past Surgical History:   Procedure Laterality Date    APPENDECTOMY  2007    BREAST EXCISIONAL BIOPSY Left     CATARACT EXTRACTION W/ INTRAOCULAR LENS  IMPLANT, BILATERAL Right     CATARACT EXTRACTION W/ INTRAOCULAR LENS  IMPLANT, BILATERAL Left     COLONOSCOPY  2022    ENDOSCOPY N/A 2019    Procedure: ESOPHAGOGASTRODUODENOSCOPY WITH DILATATION;  Surgeon:  Brunner, Mark I, MD;  Location:  [facility];  Service: Gastroenterology    ENDOSCOPY N/A 01/28/2020    Procedure: ESOPHAGOGASTRODUODENOSCOPY WITH DILITATION;  Surgeon: Brunner, Mark I, MD;  Location:  [facility];  Service: Gastroenterology    ENDOSCOPY WITH FOREIGN BODY REMOVAL N/A 12/01/2019    Procedure: ESOPHAGOGASTRODUODENOSCOPY WITH FOREIGN BODY REMOVAL;  Surgeon: Brunner, Mark I, MD;  Location:  [facility];  Service: Gastroenterology    EYE SURGERY  2006 & 2007    Cataract surgery    JOINT REPLACEMENT  2017& 2018    Partial knee replacement-left and right    LAPAROSCOPIC APPENDECTOMY  11/2007    LUMBAR DISCECTOMY  07/2011    minimally invasive lumbar decompression    PARTIAL KNEE ARTHROPLASTY Left 04/2018    PARTIAL KNEE ARTHROPLASTY Right 06/2017    SKIN CANCER EXCISION  02/2014    Melanoma on left shoulder    TOTAL ABDOMINAL HYSTERECTOMY WITH SALPINGO OOPHORECTOMY Bilateral 1996    Dr. Page - done for bleeding       Family History:   Family History   Problem Relation Age of Onset    Arthritis Mother     Breast cancer Maternal Aunt 64    Breast cancer Paternal Cousin 60    Breast cancer Maternal Cousin 65    Ovarian cancer Neg Hx        Social History:   Social History     Socioeconomic History    Marital status:    Tobacco Use    Smoking status: Never     Passive exposure: Never    Smokeless tobacco: Never   Vaping Use    Vaping status: Never Used   Substance and Sexual Activity    Alcohol use: Yes     Alcohol/week: 5.0 standard drinks of alcohol     Types: 3 Glasses of wine, 2 Cans of beer per week     Comment: Social    Drug use: Never    Sexual activity: Yes     Partners: Male     Birth control/protection: Post-menopausal, Hysterectomy, Surgical       Immunizations:   Immunization History   Administered Date(s) Administered    COVID-19 (PFIZER) BIVALENT 12+YRS 10/25/2022, 04/22/2023    COVID-19 (PFIZER) Purple Cap Monovalent 01/24/2021, 02/20/2021, 08/20/2021    COVID-19 F23  (PFIZER) 12YRS+ (COMIRNATY) 11/28/2023    Covid-19 (Pfizer) Gray Cap Monovalent 04/08/2022    Fluad Quad 65+ 10/07/2020, 11/28/2023    Fluzone High Dose =>65 Years (Vaxcare ONLY) 10/14/2016, 11/02/2021    Fluzone High-Dose 65+yrs 10/25/2022    Fluzone Quad >6mos (Multi-dose) 10/20/2015, 10/22/2018, 10/08/2019    Pneumococcal Conjugate 13-Valent (PCV13) 10/20/2015    Pneumococcal Polysaccharide (PPSV23) 07/20/2021    Shingrix 07/17/2023        Medications:     Current Outpatient Medications:     Calcium 200 MG tablet, Take  by mouth., Disp: , Rfl:     celecoxib (CeleBREX) 100 MG capsule, , Disp: , Rfl:     Cholecalciferol (VITAMIN D-3 PO), Take  by mouth., Disp: , Rfl:     denosumab (Prolia) 60 MG/ML solution prefilled syringe syringe, Inject  under the skin into the appropriate area as directed 1 (One) Time., Disp: , Rfl:     fluticasone (FLONASE) 50 MCG/ACT nasal spray, 2 sprays into the nostril(s) as directed by provider Daily., Disp: , Rfl:     lisinopril (PRINIVIL,ZESTRIL) 20 MG tablet, Take 1 tablet by mouth Daily., Disp: 90 tablet, Rfl: 3    metoprolol succinate XL (TOPROL-XL) 25 MG 24 hr tablet, Take 1 tablet by mouth Daily., Disp: 90 tablet, Rfl: 3    omeprazole (priLOSEC) 40 MG capsule, Take 1 capsule by mouth Daily., Disp: 90 capsule, Rfl: 1    Probiotic Product (PROBIOTIC DAILY PO), Take 1 tablet by mouth Daily., Disp: , Rfl:     RESTASIS 0.05 % ophthalmic emulsion, Administer 1 drop to both eyes 2 (Two) Times a Day., Disp: , Rfl: 3    temazepam (RESTORIL) 15 MG capsule, Take 1 capsule by mouth Every Night., Disp: 90 capsule, Rfl: 1    traZODone (DESYREL) 100 MG tablet, TAKE ONE TABLET BY MOUTH ONCE NIGHTLY, Disp: 90 tablet, Rfl: 0    VITAMIN K PO, Take  by mouth., Disp: , Rfl:     azelastine (ASTELIN) 0.1 % nasal spray, 2 sprays into the nostril(s) as directed by provider 2 (Two) Times a Day. Use in each nostril as directed, Disp: 30 mL, Rfl: 2    levocetirizine (XYZAL) 5 MG tablet, Take 1 tablet by  "mouth Every Evening., Disp: 30 tablet, Rfl: 2    Allergies:   Allergies   Allergen Reactions    2,4-D Dimethylamine Rash    Cefdinir Other (See Comments), Unknown (See Comments) and Diarrhea     \"OMNICEF (CEFDINIR) GAVE ME C-DIFF-SEVERAL YEARS AGO\"    Other reaction(s): Other (See Comments), Other (See Comments)    Other reaction(s): Other, Unknown    Midazolam Other (See Comments), Unknown (See Comments) and Shortness Of Breath     \"LAST TIME TOOK VERSED,COULD NOT BREATHE,WAS GIVEN REVERSAL DRUG\"    Other reaction(s): Other (See Comments), Other (See Comments)    Other reaction(s): Other, Unknown    Other Hives     OMNI IV  OMNI IV  OMNI IV    Midazolam Hcl Hives    Sulfa Antibiotics Rash    Trimethoprim Unknown (See Comments), Rash and Hives     Other reaction(s): Unknown (See Comments)  Other reaction(s): Unknown (See Comments)  Other reaction(s): Unknown (See Comments)  Other reaction(s): Unknown (See Comments)  Other reaction(s): Unknown (See Comments)         Objective     Vital Signs  /82 (BP Location: Left arm, Patient Position: Sitting, Cuff Size: Adult)   Pulse 80   Ht 162.6 cm (64.02\")   Wt 49.9 kg (110 lb)   BMI 18.87 kg/m²   Estimated body mass index is 18.87 kg/m² as calculated from the following:    Height as of this encounter: 162.6 cm (64.02\").    Weight as of this encounter: 49.9 kg (110 lb).    BMI is within normal parameters. No other follow-up for BMI required.      Physical Exam  Vitals and nursing note reviewed.   Constitutional:       General: She is not in acute distress.     Appearance: Normal appearance. She is not ill-appearing.   HENT:      Head: Normocephalic and atraumatic.      Right Ear: Tympanic membrane is not erythematous.      Left Ear: Tympanic membrane is not erythematous.      Ears:      Comments: Fluid behind TMs bilaterally     Nose:      Right Sinus: No maxillary sinus tenderness or frontal sinus tenderness.      Left Sinus: No maxillary sinus tenderness or " frontal sinus tenderness.      Mouth/Throat:      Mouth: Mucous membranes are moist.   Cardiovascular:      Rate and Rhythm: Normal rate and regular rhythm.      Pulses: Normal pulses.      Heart sounds: Normal heart sounds.   Pulmonary:      Effort: Pulmonary effort is normal. No respiratory distress.      Breath sounds: Normal breath sounds.   Musculoskeletal:      Right lower leg: No edema.      Left lower leg: No edema.   Skin:     General: Skin is warm and dry.   Neurological:      General: No focal deficit present.      Mental Status: She is alert and oriented to person, place, and time.      Coordination: Coordination normal.      Gait: Gait normal.   Psychiatric:         Mood and Affect: Mood normal.         Behavior: Behavior normal.        Physical Exam      Results      Assessment and Plan     Assessment & Plan      Diagnoses and all orders for this visit:    1. Seasonal allergic rhinitis, unspecified trigger (Primary)  Assessment & Plan:  Her rhinitis is subacute.  Her symptoms have persisted, but they have not been progressing.  I do not think she has been taking the allergy medication long enough.  We discussed that it does not help with acute symptoms, but it works better if taken consistently or ahead of a known trigger.  She expressed understanding.  Advised to monitor and try saline rinses, and keep next appointment as scheduled.    Orders:  -     levocetirizine (XYZAL) 5 MG tablet; Take 1 tablet by mouth Every Evening.  Dispense: 30 tablet; Refill: 2  -     azelastine (ASTELIN) 0.1 % nasal spray; 2 sprays into the nostril(s) as directed by provider 2 (Two) Times a Day. Use in each nostril as directed  Dispense: 30 mL; Refill: 2        Follow Up  Return for scheduled f/u or sooner if sxs worsen or persist.    Valarie Wilder PA-C  [facility]

## 2024-06-23 PROBLEM — J30.2 SEASONAL ALLERGIC RHINITIS: Status: ACTIVE | Noted: 2024-06-23

## 2024-06-24 NOTE — ASSESSMENT & PLAN NOTE
Her rhinitis is subacute.  Her symptoms have persisted, but they have not been progressing.  I do not think she has been taking the allergy medication long enough.  We discussed that it does not help with acute symptoms, but it works better if taken consistently or ahead of a known trigger.  She expressed understanding.  Advised to monitor and try saline rinses, and keep next appointment as scheduled.

## 2024-07-01 DIAGNOSIS — F51.04 PSYCHOPHYSIOLOGICAL INSOMNIA: ICD-10-CM

## 2024-07-01 RX ORDER — TEMAZEPAM 15 MG/1
15 CAPSULE ORAL NIGHTLY
Qty: 90 CAPSULE | OUTPATIENT
Start: 2024-07-01

## 2024-07-09 ENCOUNTER — OFFICE VISIT (OUTPATIENT)
Dept: FAMILY MEDICINE CLINIC | Facility: CLINIC | Age: 79
End: 2024-07-09
Payer: MEDICARE

## 2024-07-09 VITALS
HEIGHT: 64 IN | HEART RATE: 61 BPM | SYSTOLIC BLOOD PRESSURE: 126 MMHG | WEIGHT: 112.7 LBS | OXYGEN SATURATION: 90 % | BODY MASS INDEX: 19.24 KG/M2 | DIASTOLIC BLOOD PRESSURE: 74 MMHG

## 2024-07-09 DIAGNOSIS — E55.9 VITAMIN D DEFICIENCY: ICD-10-CM

## 2024-07-09 DIAGNOSIS — Z00.00 ENCOUNTER FOR WELLNESS EXAMINATION IN ADULT: Primary | ICD-10-CM

## 2024-07-09 DIAGNOSIS — F51.04 PSYCHOPHYSIOLOGICAL INSOMNIA: ICD-10-CM

## 2024-07-09 PROCEDURE — 99397 PER PM REEVAL EST PAT 65+ YR: CPT | Performed by: FAMILY MEDICINE

## 2024-07-09 PROCEDURE — 3078F DIAST BP <80 MM HG: CPT | Performed by: FAMILY MEDICINE

## 2024-07-09 PROCEDURE — G0439 PPPS, SUBSEQ VISIT: HCPCS | Performed by: FAMILY MEDICINE

## 2024-07-09 PROCEDURE — 3074F SYST BP LT 130 MM HG: CPT | Performed by: FAMILY MEDICINE

## 2024-07-09 PROCEDURE — 1126F AMNT PAIN NOTED NONE PRSNT: CPT | Performed by: FAMILY MEDICINE

## 2024-07-09 RX ORDER — TEMAZEPAM 15 MG/1
15 CAPSULE ORAL NIGHTLY
Qty: 90 CAPSULE | Refills: 1 | Status: SHIPPED | OUTPATIENT
Start: 2024-07-09

## 2024-07-09 NOTE — ASSESSMENT & PLAN NOTE
UDS and controlled substance agreement are up-to-date.  [place] reviewed, temazepam refilled.  Follow-up in 6 months.  Adverse effects discussed.  Avoid alcohol, driving, or operating machinery while taking medications.

## 2024-07-09 NOTE — PROGRESS NOTES
The ABCs of the Annual Wellness Visit  Subsequent Medicare Wellness Visit    Subjective    Holly Davis is a 78 y.o. female who presents for a Subsequent Medicare Wellness Visit.  Chronic medical conditions include allergic rhinitis, insomnia, KSENIA on CPAP therapy, hypertension, hyperlipidemia, chronic constipation, pernicious anemia, osteoporosis.  She has an appointment for DEXA and is otherwise up-to-date on health maintenance.    The following portions of the patient's history were reviewed and   updated as appropriate: allergies, current medications, past family history, past medical history, past social history, past surgical history, and problem list.    Compared to one year ago, the patient feels her physical   health is better.    Compared to one year ago, the patient feels her mental   health is the same.    Recent Hospitalizations:  She was not admitted to the hospital during the last year.       Current Medical Providers:  Patient Care Team:  Giovana Kruger DO as PCP - General (Family Medicine)  Nazia Monreal MD as Consulting Physician (Hematology and Oncology)  Adama Escobar MD as Obstetrician (Obstetrics and Gynecology)  Marty Escalante MD as Consulting Physician (Cardiology)  Bayron King MD as Consulting Physician (Dermatology)    Outpatient Medications Prior to Visit   Medication Sig Dispense Refill    azelastine (ASTELIN) 0.1 % nasal spray 2 sprays into the nostril(s) as directed by provider 2 (Two) Times a Day. Use in each nostril as directed 30 mL 2    Calcium 200 MG tablet Take  by mouth.      celecoxib (CeleBREX) 100 MG capsule Take 1 capsule by mouth 2 (Two) Times a Day.      Cholecalciferol (VITAMIN D-3 PO) Take  by mouth.      denosumab (Prolia) 60 MG/ML solution prefilled syringe syringe Inject  under the skin into the appropriate area as directed 1 (One) Time.      fluticasone (FLONASE) 50 MCG/ACT nasal spray 2 sprays into the nostril(s) as directed by  provider Daily.      lisinopril (PRINIVIL,ZESTRIL) 20 MG tablet Take 1 tablet by mouth Daily. 90 tablet 3    metoprolol succinate XL (TOPROL-XL) 25 MG 24 hr tablet Take 1 tablet by mouth Daily. 90 tablet 3    omeprazole (priLOSEC) 40 MG capsule Take 1 capsule by mouth Daily. 90 capsule 1    Probiotic Product (PROBIOTIC DAILY PO) Take 1 tablet by mouth Daily.      RESTASIS 0.05 % ophthalmic emulsion Administer 1 drop to both eyes 2 (Two) Times a Day.  3    traZODone (DESYREL) 100 MG tablet TAKE ONE TABLET BY MOUTH ONCE NIGHTLY 90 tablet 0    VITAMIN K PO Take  by mouth.      temazepam (RESTORIL) 15 MG capsule Take 1 capsule by mouth Every Night. 90 capsule 1    levocetirizine (XYZAL) 5 MG tablet Take 1 tablet by mouth Every Evening. (Patient not taking: Reported on 7/9/2024) 30 tablet 2     No facility-administered medications prior to visit.       No opioid medication identified on active medication list. I have reviewed chart for other potential  high risk medication/s and harmful drug interactions in the elderly.        Aspirin is not on active medication list.  Aspirin use is not indicated based on review of current medical condition/s. Risk of harm outweighs potential benefits.  .    Patient Active Problem List   Diagnosis    Sinus tachycardia    Osteoporosis    Alopecia    Hyperlipidemia LDL goal <100    Pernicious anemia    Essential hypertension    Annual GYN exam S/P TAHBSO    Chronic constipation    Skin lesion of left arm    Psychophysiological insomnia    Seasonal allergic rhinitis    Encounter for wellness examination in adult     Advance Care Planning   Advance Care Planning     Advance Directive is on file.  ACP discussion was held with the patient during this visit. Patient has an advance directive in EMR which is still valid.      Objective    Vitals:    07/09/24 1316   BP: 126/74   BP Location: Left arm   Patient Position: Sitting   Cuff Size: Adult   Pulse: 61   SpO2: 90%   Weight: 51.1 kg (112 lb  "11.2 oz)   Height: 162.6 cm (64\")     Estimated body mass index is 19.34 kg/m² as calculated from the following:    Height as of this encounter: 162.6 cm (64\").    Weight as of this encounter: 51.1 kg (112 lb 11.2 oz).    BMI is within normal parameters. No other follow-up for BMI required.      Does the patient have evidence of cognitive impairment? No          HEALTH RISK ASSESSMENT    Smoking Status:  Social History     Tobacco Use   Smoking Status Never    Passive exposure: Never   Smokeless Tobacco Never     Alcohol Consumption:  Social History     Substance and Sexual Activity   Alcohol Use Yes    Alcohol/week: 5.0 standard drinks of alcohol    Types: 3 Glasses of wine, 2 Cans of beer per week    Comment: Social     Fall Risk Screen:    NEPTALIADI Fall Risk Assessment was completed, and patient is at LOW risk for falls.Assessment completed on:2024    Depression Screenin/9/2024     1:15 PM   PHQ-2/PHQ-9 Depression Screening   Little Interest or Pleasure in Doing Things 0-->not at all   Feeling Down, Depressed or Hopeless 0-->not at all   PHQ-9: Brief Depression Severity Measure Score 0       Health Habits and Functional and Cognitive Screenin/2/2024     9:31 AM   Functional & Cognitive Status   Do you have difficulty preparing food and eating? No   Do you have difficulty bathing yourself, getting dressed or grooming yourself? No   Do you have difficulty using the toilet? No   Do you have difficulty moving around from place to place? No   Do you have trouble with steps or getting out of a bed or a chair? No   Current Diet Well Balanced Diet   Dental Exam Up to date   Eye Exam Up to date   Exercise (times per week) 2 times per week   Current Exercises Include Aerobics;Cardiovascular Workout;Home Fitness Gym;Light Weights;Pilates;Stationary Bicycling/Spin Class;Stair Step Machine;Weightlifting   Do you need help using the phone?  No   Are you deaf or do you have serious difficulty hearing?  No "   Do you need help to go to places out of walking distance? No   Do you need help shopping? No   Do you need help preparing meals?  No   Do you need help with housework?  No   Do you need help with laundry? No   Do you need help taking your medications? No   Do you need help managing money? No   Do you ever drive or ride in a car without wearing a seat belt? No   Have you felt unusual stress, anger or loneliness in the last month? No   Who do you live with? Spouse   If you need help, do you have trouble finding someone available to you? No   Have you been bothered in the last four weeks by sexual problems? No   Do you have difficulty concentrating, remembering or making decisions? No       Age-appropriate Screening Schedule:  Refer to the list below for future screening recommendations based on patient's age, sex and/or medical conditions. Orders for these recommended tests are listed in the plan section. The patient has been provided with a written plan.    Health Maintenance   Topic Date Due    TDAP/TD VACCINES (1 - Tdap) Never done    RSV Vaccine - Adults (1 - 1-dose 60+ series) Never done    ZOSTER VACCINE (2 of 2) 09/11/2023    DXA SCAN  06/29/2024    COVID-19 Vaccine (9 - 2023-24 season) 09/28/2024 (Originally 3/28/2024)    INFLUENZA VACCINE  08/01/2024    ANNUAL WELLNESS VISIT  07/09/2025    COLORECTAL CANCER SCREENING  12/27/2032    Pneumococcal Vaccine 65+  Completed    LIPID PANEL  Discontinued                  CMS Preventative Services Quick Reference  Risk Factors Identified During Encounter  Inactivity/Sedentary: Patient was advised to exercise at least 150 minutes a week per CDC recommendations.  The above risks/problems have been discussed with the patient.  Pertinent information has been shared with the patient in the After Visit Summary.  An After Visit Summary and PPPS were made available to the patient.    Follow Up:   Next Medicare Wellness visit to be scheduled in 1 year.           Review of  "Systems   Constitutional:  Negative for chills, fatigue and fever.   Respiratory:  Negative for cough, shortness of breath and wheezing.    Cardiovascular:  Negative for chest pain, palpitations and leg swelling.   Gastrointestinal:  Negative for abdominal pain, constipation, diarrhea, nausea and vomiting.   Skin:  Negative for rash.   Neurological:  Negative for dizziness and weakness.   Psychiatric/Behavioral:  The patient is not nervous/anxious.        Objective   Vital Signs:  /74 (BP Location: Left arm, Patient Position: Sitting, Cuff Size: Adult)   Pulse 61   Ht 162.6 cm (64\")   Wt 51.1 kg (112 lb 11.2 oz)   SpO2 90%   BMI 19.34 kg/m²     Physical Exam  Vitals and nursing note reviewed.   Constitutional:       Appearance: Normal appearance.   HENT:      Right Ear: Tympanic membrane and ear canal normal.      Left Ear: Tympanic membrane and ear canal normal.      Mouth/Throat:      Mouth: Mucous membranes are moist.      Pharynx: Oropharynx is clear.   Eyes:      Pupils: Pupils are equal, round, and reactive to light.   Cardiovascular:      Rate and Rhythm: Normal rate and regular rhythm.      Heart sounds: Normal heart sounds.   Pulmonary:      Effort: Pulmonary effort is normal.      Breath sounds: Normal breath sounds.   Abdominal:      General: Bowel sounds are normal.      Palpations: Abdomen is soft.      Tenderness: There is no abdominal tenderness.   Musculoskeletal:      Cervical back: Neck supple.   Lymphadenopathy:      Cervical: No cervical adenopathy.   Neurological:      Mental Status: She is alert and oriented to person, place, and time. Mental status is at baseline.   Psychiatric:         Mood and Affect: Mood normal.         Behavior: Behavior normal.                         Assessment and Plan   Diagnoses and all orders for this visit:    1. Encounter for wellness examination in adult (Primary)  Assessment & Plan:  Pt will return for fasting labs, UTD on health " maintenance    Orders:  -     Hemoglobin A1c; Future  -     CBC Auto Differential; Future  -     Comprehensive Metabolic Panel; Future  -     Lipid Panel; Future  -     TSH; Future  -     Vitamin B12; Future    2. Psychophysiological insomnia  Comments:  [place] refill medications  Assessment & Plan:  UDS and controlled substance agreement are up-to-date.  [place] reviewed, temazepam refilled.  Follow-up in 6 months.  Adverse effects discussed.  Avoid alcohol, driving, or operating machinery while taking medications.      Orders:  -     temazepam (RESTORIL) 15 MG capsule; Take 1 capsule by mouth Every Night.  Dispense: 90 capsule; Refill: 1    3. Vitamin D deficiency  -     Vitamin D,25-Hydroxy; Future      Discussed injury prevention, diet and exercise, safe sexual practices, and screening for common diseases. Encouraged use of sunscreen and seatbelts. Encouraged SBE, avoidance of tobacco, limiting alcohol, and yearly dental and eye exams.          Follow Up   No follow-ups on file.  Patient was given instructions and counseling regarding her condition or for health maintenance advice. Please see specific information pulled into the AVS if appropriate.

## 2024-08-16 ENCOUNTER — LAB (OUTPATIENT)
Dept: FAMILY MEDICINE CLINIC | Facility: CLINIC | Age: 79
End: 2024-08-16
Payer: MEDICARE

## 2024-08-16 DIAGNOSIS — E55.9 VITAMIN D DEFICIENCY: ICD-10-CM

## 2024-08-16 DIAGNOSIS — Z00.00 ENCOUNTER FOR WELLNESS EXAMINATION IN ADULT: ICD-10-CM

## 2024-08-16 PROCEDURE — 36415 COLL VENOUS BLD VENIPUNCTURE: CPT | Performed by: FAMILY MEDICINE

## 2024-08-17 LAB
25(OH)D3+25(OH)D2 SERPL-MCNC: 52.2 NG/ML (ref 30–100)
ALBUMIN SERPL-MCNC: 4.5 G/DL (ref 3.8–4.8)
ALP SERPL-CCNC: 92 IU/L (ref 44–121)
ALT SERPL-CCNC: 18 IU/L (ref 0–32)
AST SERPL-CCNC: 18 IU/L (ref 0–40)
BASOPHILS # BLD AUTO: 0 X10E3/UL (ref 0–0.2)
BASOPHILS NFR BLD AUTO: 0 %
BILIRUB SERPL-MCNC: 0.6 MG/DL (ref 0–1.2)
BUN SERPL-MCNC: 28 MG/DL (ref 8–27)
BUN/CREAT SERPL: 38 (ref 12–28)
CALCIUM SERPL-MCNC: 9.1 MG/DL (ref 8.7–10.3)
CHLORIDE SERPL-SCNC: 103 MMOL/L (ref 96–106)
CHOLEST SERPL-MCNC: 167 MG/DL (ref 100–199)
CO2 SERPL-SCNC: 21 MMOL/L (ref 20–29)
CREAT SERPL-MCNC: 0.73 MG/DL (ref 0.57–1)
EGFRCR SERPLBLD CKD-EPI 2021: 83 ML/MIN/1.73
EOSINOPHIL # BLD AUTO: 0 X10E3/UL (ref 0–0.4)
EOSINOPHIL NFR BLD AUTO: 0 %
ERYTHROCYTE [DISTWIDTH] IN BLOOD BY AUTOMATED COUNT: 13.7 % (ref 11.7–15.4)
GLOBULIN SER CALC-MCNC: 2.3 G/DL (ref 1.5–4.5)
GLUCOSE SERPL-MCNC: 101 MG/DL (ref 70–99)
HBA1C MFR BLD: 5.2 % (ref 4.8–5.6)
HCT VFR BLD AUTO: 38 % (ref 34–46.6)
HDLC SERPL-MCNC: 86 MG/DL
HGB BLD-MCNC: 11.9 G/DL (ref 11.1–15.9)
IMM GRANULOCYTES # BLD AUTO: 0 X10E3/UL (ref 0–0.1)
IMM GRANULOCYTES NFR BLD AUTO: 0 %
LDLC SERPL CALC-MCNC: 72 MG/DL (ref 0–99)
LYMPHOCYTES # BLD AUTO: 0.7 X10E3/UL (ref 0.7–3.1)
LYMPHOCYTES NFR BLD AUTO: 13 %
MCH RBC QN AUTO: 27.9 PG (ref 26.6–33)
MCHC RBC AUTO-ENTMCNC: 31.3 G/DL (ref 31.5–35.7)
MCV RBC AUTO: 89 FL (ref 79–97)
MONOCYTES # BLD AUTO: 0.6 X10E3/UL (ref 0.1–0.9)
MONOCYTES NFR BLD AUTO: 12 %
MORPHOLOGY BLD-IMP: ABNORMAL
NEUTROPHILS # BLD AUTO: 3.9 X10E3/UL (ref 1.4–7)
NEUTROPHILS NFR BLD AUTO: 75 %
PLATELET # BLD AUTO: 89 X10E3/UL (ref 150–450)
POTASSIUM SERPL-SCNC: 4.5 MMOL/L (ref 3.5–5.2)
PROT SERPL-MCNC: 6.8 G/DL (ref 6–8.5)
RBC # BLD AUTO: 4.27 X10E6/UL (ref 3.77–5.28)
SODIUM SERPL-SCNC: 138 MMOL/L (ref 134–144)
TRIGL SERPL-MCNC: 41 MG/DL (ref 0–149)
TSH SERPL DL<=0.005 MIU/L-ACNC: 0.81 UIU/ML (ref 0.45–4.5)
VIT B12 SERPL-MCNC: 795 PG/ML (ref 232–1245)
VLDLC SERPL CALC-MCNC: 9 MG/DL (ref 5–40)
WBC # BLD AUTO: 5.2 X10E3/UL (ref 3.4–10.8)

## 2024-09-26 DIAGNOSIS — F51.04 PSYCHOPHYSIOLOGICAL INSOMNIA: ICD-10-CM

## 2024-09-26 RX ORDER — TRAZODONE HYDROCHLORIDE 100 MG/1
100 TABLET ORAL NIGHTLY
Qty: 90 TABLET | Refills: 0 | Status: SHIPPED | OUTPATIENT
Start: 2024-09-26

## 2024-10-04 DIAGNOSIS — F51.04 PSYCHOPHYSIOLOGICAL INSOMNIA: ICD-10-CM

## 2024-10-04 RX ORDER — TEMAZEPAM 15 MG/1
15 CAPSULE ORAL NIGHTLY
Qty: 90 CAPSULE | Refills: 1 | Status: SHIPPED | OUTPATIENT
Start: 2024-10-04

## 2024-10-04 NOTE — TELEPHONE ENCOUNTER
Caller: Susan Hollyjm Ott    Relationship: Self    Best call back number: [phone]    Requested Prescriptions:   Requested Prescriptions     Pending Prescriptions Disp Refills    temazepam (RESTORIL) 15 MG capsule 90 capsule 1     Sig: Take 1 capsule by mouth Every Night.        Pharmacy where request should be sent: [facility] [id number] [address] AT [facility] [address] - [phone]  - [phone] FX     Last office visit with prescribing clinician: 7/9/2024   Last telemedicine visit with prescribing clinician: Visit date not found   Next office visit with prescribing clinician: 1/8/2025     Additional details provided by patient: PT NEEDS AN EARLY REFILL DUE TO HER GOING ON VACATION TODAY AND WILL RUN OUT OF MEDICATION WHILE GONE. PLEASE SEND IN MEDICATION ASAP SO SHE CAN PICK THEM UP TODAY    Does the patient have less than a 3 day supply:  [x] Yes  [] No    Would you like a call back once the refill request has been completed: [x] Yes [] No    If the office needs to give you a call back, can they leave a voicemail: [x] Yes [] No    Madalyn Johnson Rep   10/04/24 08:15 EDT

## 2024-11-07 ENCOUNTER — TELEPHONE (OUTPATIENT)
Dept: ONCOLOGY | Facility: CLINIC | Age: 79
End: 2024-11-07
Payer: MEDICARE

## 2024-11-07 ENCOUNTER — LAB (OUTPATIENT)
Dept: LAB | Facility: HOSPITAL | Age: 79
End: 2024-11-07
Payer: MEDICARE

## 2024-11-07 DIAGNOSIS — D47.2 MONOCLONAL GAMMOPATHY: ICD-10-CM

## 2024-11-07 LAB
ALBUMIN SERPL-MCNC: 4.1 G/DL (ref 3.5–5.2)
ALBUMIN/GLOB SERPL: 1.7 G/DL
ALP SERPL-CCNC: 82 U/L (ref 39–117)
ALT SERPL W P-5'-P-CCNC: 17 U/L (ref 1–33)
ANION GAP SERPL CALCULATED.3IONS-SCNC: 9 MMOL/L (ref 5–15)
AST SERPL-CCNC: 18 U/L (ref 1–32)
BASOPHILS # BLD AUTO: 0.02 10*3/MM3 (ref 0–0.2)
BASOPHILS NFR BLD AUTO: 0.6 % (ref 0–1.5)
BILIRUB SERPL-MCNC: 0.7 MG/DL (ref 0–1.2)
BUN SERPL-MCNC: 17 MG/DL (ref 8–23)
BUN/CREAT SERPL: 24.3 (ref 7–25)
CALCIUM SPEC-SCNC: 9.3 MG/DL (ref 8.6–10.5)
CHLORIDE SERPL-SCNC: 101 MMOL/L (ref 98–107)
CO2 SERPL-SCNC: 26 MMOL/L (ref 22–29)
CREAT SERPL-MCNC: 0.7 MG/DL (ref 0.57–1)
DEPRECATED RDW RBC AUTO: 49.8 FL (ref 37–54)
EGFRCR SERPLBLD CKD-EPI 2021: 87.6 ML/MIN/1.73
EOSINOPHIL # BLD AUTO: 0.04 10*3/MM3 (ref 0–0.4)
EOSINOPHIL NFR BLD AUTO: 1.2 % (ref 0.3–6.2)
ERYTHROCYTE [DISTWIDTH] IN BLOOD BY AUTOMATED COUNT: 15.8 % (ref 12.3–15.4)
GLOBULIN UR ELPH-MCNC: 2.4 GM/DL
GLUCOSE SERPL-MCNC: 88 MG/DL (ref 65–99)
HCT VFR BLD AUTO: 36.3 % (ref 34–46.6)
HGB BLD-MCNC: 11.8 G/DL (ref 12–15.9)
IMM GRANULOCYTES # BLD AUTO: 0.01 10*3/MM3 (ref 0–0.05)
IMM GRANULOCYTES NFR BLD AUTO: 0.3 % (ref 0–0.5)
LYMPHOCYTES # BLD AUTO: 0.85 10*3/MM3 (ref 0.7–3.1)
LYMPHOCYTES NFR BLD AUTO: 25.4 % (ref 19.6–45.3)
MCH RBC QN AUTO: 28.4 PG (ref 26.6–33)
MCHC RBC AUTO-ENTMCNC: 32.5 G/DL (ref 31.5–35.7)
MCV RBC AUTO: 87.3 FL (ref 79–97)
MONOCYTES # BLD AUTO: 0.31 10*3/MM3 (ref 0.1–0.9)
MONOCYTES NFR BLD AUTO: 9.3 % (ref 5–12)
NEUTROPHILS NFR BLD AUTO: 2.11 10*3/MM3 (ref 1.7–7)
NEUTROPHILS NFR BLD AUTO: 63.2 % (ref 42.7–76)
NRBC BLD AUTO-RTO: 0 /100 WBC (ref 0–0.2)
PLATELET # BLD AUTO: 119 10*3/MM3 (ref 140–450)
PMV BLD AUTO: 10.3 FL (ref 6–12)
POTASSIUM SERPL-SCNC: 3.8 MMOL/L (ref 3.5–5.2)
PROT SERPL-MCNC: 6.5 G/DL (ref 6–8.5)
RBC # BLD AUTO: 4.16 10*6/MM3 (ref 3.77–5.28)
SODIUM SERPL-SCNC: 136 MMOL/L (ref 136–145)
WBC NRBC COR # BLD AUTO: 3.34 10*3/MM3 (ref 3.4–10.8)

## 2024-11-07 PROCEDURE — 84165 PROTEIN E-PHORESIS SERUM: CPT

## 2024-11-07 PROCEDURE — 85025 COMPLETE CBC W/AUTO DIFF WBC: CPT

## 2024-11-07 PROCEDURE — 36415 COLL VENOUS BLD VENIPUNCTURE: CPT

## 2024-11-07 PROCEDURE — 86334 IMMUNOFIX E-PHORESIS SERUM: CPT

## 2024-11-07 PROCEDURE — 80053 COMPREHEN METABOLIC PANEL: CPT

## 2024-11-07 PROCEDURE — 82784 ASSAY IGA/IGD/IGG/IGM EACH: CPT

## 2024-11-07 PROCEDURE — 83521 IG LIGHT CHAINS FREE EACH: CPT

## 2024-11-07 NOTE — TELEPHONE ENCOUNTER
Caller: Holly Davis    Relationship to patient: Self    Best call back number: [phone]      Chief complaint: NEEDS TO RESCHEDULE DUE TO HAS TO BE IN COURT THE SAME DAY     Type of visit: FOLLOW UP 1    Requested date: ANY DAY THE FOLLOWING WEEK EXCEPT 11/20     If rescheduling, when is the original appointment: 11/13     Additional notes:GETTING LABS DONE TODAY FOR DR RITCHIE

## 2024-11-08 LAB
KAPPA LC FREE SER-MCNC: 8.4 MG/L (ref 3.3–19.4)
KAPPA LC FREE/LAMBDA FREE SER: 0.34 {RATIO} (ref 0.26–1.65)
LAMBDA LC FREE SERPL-MCNC: 24.8 MG/L (ref 5.7–26.3)

## 2024-11-11 LAB
ALBUMIN SERPL ELPH-MCNC: 3.8 G/DL (ref 2.9–4.4)
ALBUMIN/GLOB SERPL: 1.5 {RATIO} (ref 0.7–1.7)
ALPHA1 GLOB SERPL ELPH-MCNC: 0.2 G/DL (ref 0–0.4)
ALPHA2 GLOB SERPL ELPH-MCNC: 0.8 G/DL (ref 0.4–1)
B-GLOBULIN SERPL ELPH-MCNC: 0.9 G/DL (ref 0.7–1.3)
GAMMA GLOB SERPL ELPH-MCNC: 0.8 G/DL (ref 0.4–1.8)
GLOBULIN SER-MCNC: 2.7 G/DL (ref 2.2–3.9)
IGA SERPL-MCNC: 44 MG/DL (ref 64–422)
IGG SERPL-MCNC: 895 MG/DL (ref 586–1602)
IGM SERPL-MCNC: 82 MG/DL (ref 26–217)
INTERPRETATION SERPL IEP-IMP: ABNORMAL
LABORATORY COMMENT REPORT: ABNORMAL
M PROTEIN SERPL ELPH-MCNC: 0.6 G/DL
PROT SERPL-MCNC: 6.5 G/DL (ref 6–8.5)

## 2024-12-05 ENCOUNTER — OFFICE VISIT (OUTPATIENT)
Dept: ONCOLOGY | Facility: CLINIC | Age: 79
End: 2024-12-05
Payer: MEDICARE

## 2024-12-05 VITALS
OXYGEN SATURATION: 98 % | TEMPERATURE: 97.7 F | RESPIRATION RATE: 16 BRPM | WEIGHT: 115.9 LBS | HEIGHT: 64 IN | BODY MASS INDEX: 19.79 KG/M2 | HEART RATE: 76 BPM | DIASTOLIC BLOOD PRESSURE: 70 MMHG | SYSTOLIC BLOOD PRESSURE: 137 MMHG

## 2024-12-05 DIAGNOSIS — D64.9 ANEMIA, UNSPECIFIED TYPE: ICD-10-CM

## 2024-12-05 DIAGNOSIS — D47.2 MONOCLONAL GAMMOPATHY: Primary | ICD-10-CM

## 2024-12-05 PROCEDURE — 3078F DIAST BP <80 MM HG: CPT | Performed by: INTERNAL MEDICINE

## 2024-12-05 PROCEDURE — 99213 OFFICE O/P EST LOW 20 MIN: CPT | Performed by: INTERNAL MEDICINE

## 2024-12-05 PROCEDURE — 3075F SYST BP GE 130 - 139MM HG: CPT | Performed by: INTERNAL MEDICINE

## 2024-12-05 PROCEDURE — 1126F AMNT PAIN NOTED NONE PRSNT: CPT | Performed by: INTERNAL MEDICINE

## 2024-12-05 RX ORDER — SODIUM, POTASSIUM,MAG SULFATES 17.5-3.13G
1 SOLUTION, RECONSTITUTED, ORAL ORAL ONCE
COMMUNITY

## 2024-12-05 RX ORDER — MULTIVIT-MIN/IRON/FOLIC ACID/K 18-600-40
CAPSULE ORAL DAILY
COMMUNITY

## 2024-12-05 RX ORDER — ACETAMINOPHEN 500 MG
TABLET ORAL DAILY
COMMUNITY

## 2024-12-05 RX ORDER — OXYCODONE AND ACETAMINOPHEN 5; 325 MG/1; MG/1
1 TABLET ORAL AS NEEDED
COMMUNITY
Start: 2024-09-18

## 2024-12-05 RX ORDER — ESTRADIOL 1 MG/1
1 TABLET ORAL DAILY
COMMUNITY

## 2024-12-05 RX ORDER — FLUVASTATIN SODIUM 80 MG/1
80 TABLET, FILM COATED, EXTENDED RELEASE ORAL DAILY
COMMUNITY
Start: 2024-07-22

## 2024-12-05 RX ORDER — ATROPINE SULFATE 10 MG/ML
1 SOLUTION/ DROPS OPHTHALMIC AS NEEDED
COMMUNITY

## 2024-12-05 NOTE — PROGRESS NOTES
"  PROBLEM LIST:  1. 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  2. osteoprosis  3. Tachycardia  4. hyperlipidemia    Subjective      Cc: mgus    HISTORY OF PRESENT ILLNESS:   Holly Davis returns for follow-up.  She is doing well.  No new symptoms.      Objective      /70   Pulse 76   Temp 97.7 °F (36.5 °C) (Temporal)   Resp 16   Ht 162.6 cm (64.02\")   Wt 52.6 kg (115 lb 14.4 oz)   LMP  (LMP Unknown)   SpO2 98%   BMI 19.88 kg/m²      Performance Status: 0    General: well appearing female in no acute distress  Neuro: alert and oriented  HEENT: sclera anicteric  Skin: no rashes, lesions, bruising, or petechiae  Psych: mood and affect appropriate      RECENT LABS:  Lab Results   Component Value Date    WBC 3.34 (L) 11/07/2024    HGB 11.8 (L) 11/07/2024    HCT 36.3 11/07/2024    MCV 87.3 11/07/2024     (L) 11/07/2024     Lab Results   Component Value Date    GLUCOSE 88 11/07/2024    BUN 17 11/07/2024    CREATININE 0.70 11/07/2024    EGFRIFNONA 60 (L) 11/15/2021    EGFRIFAFRI 95 04/19/2021    BCR 24.3 11/07/2024    K 3.8 11/07/2024    CO2 26.0 11/07/2024    CALCIUM 9.3 11/07/2024    PROTENTOTREF 6.5 11/07/2024    ALBUMIN 4.1 11/07/2024    ALBUMIN 3.8 11/07/2024    LABIL2 1.5 11/07/2024    AST 18 11/07/2024    ALT 17 11/07/2024     M spike is 0.6    Assessment & Plan   Holly Davis is a 79 y.o. 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  Her M spike remains stable at 0.6.  Hemoglobin is stable, normal creatinine and calcium.    We discussed that her white blood cell count and her platelets both are mildly decreased.  This is a new finding for her.  I would like to have her recheck labs in about a month.  If these levels are still low we may need to consider further workup.  I will also check B12 and folate levels with her next lab draw.    I will go " ahead and schedule follow-up for a year but we may change this depending on the results of her upcoming labs.    Follow-up in 1 year.                        Nazia Monreal MD  [facility]    12/5/2024          CC:

## 2024-12-17 ENCOUNTER — TELEPHONE (OUTPATIENT)
Dept: FAMILY MEDICINE CLINIC | Facility: CLINIC | Age: 79
End: 2024-12-17
Payer: MEDICARE

## 2024-12-17 NOTE — TELEPHONE ENCOUNTER
Left a voice mail for patient to change their appointment and establish with an available provider. Dr Kruger is no longer with the practice.    Left them a voice message that we are not taking new patients, Hortencia Dick is just filling in to help with patient load from losing a provider. Appointment was cancelled.    They can check with the following locations:  [address] [address] [phone]    [place] [address] [facility] [address], [place] [address] [phone]

## 2024-12-18 DIAGNOSIS — F51.04 PSYCHOPHYSIOLOGICAL INSOMNIA: ICD-10-CM

## 2024-12-18 RX ORDER — TEMAZEPAM 15 MG/1
15 CAPSULE ORAL NIGHTLY
Qty: 90 CAPSULE | Refills: 0 | Status: SHIPPED | OUTPATIENT
Start: 2024-12-18

## 2024-12-18 NOTE — TELEPHONE ENCOUNTER
Caller: Susan Hollyjm Ott    Relationship: Self    Best call back number:[phone]    Requested Prescriptions:   Requested Prescriptions     Pending Prescriptions Disp Refills    temazepam (RESTORIL) 15 MG capsule 90 capsule 1     Sig: Take 1 capsule by mouth Every Night.        Pharmacy where request should be sent: [facility] [id number] [address] AT [facility] [address] - [phone]  - [phone] FX     Last office visit with prescribing clinician: 7/9/2024   Last telemedicine visit with prescribing clinician: Visit date not found   Next office visit with prescribing clinician: Visit date not found     Additional details provided by patient: PATIENT NEEDS REFILL     Does the patient have less than a 3 day supply:  [] Yes  [x] No    Would you like a call back once the refill request has been completed: [] Yes [x] No    If the office needs to give you a call back, can they leave a voicemail: [] Yes [x] No    Madalyn Bush Rep   12/18/24 09:16 EST

## 2024-12-20 ENCOUNTER — TRANSCRIBE ORDERS (OUTPATIENT)
Dept: ADMINISTRATIVE | Facility: HOSPITAL | Age: 79
End: 2024-12-20
Payer: MEDICARE

## 2024-12-20 DIAGNOSIS — Z12.31 VISIT FOR SCREENING MAMMOGRAM: Primary | ICD-10-CM

## 2025-01-25 ENCOUNTER — OFFICE VISIT (OUTPATIENT)
Dept: FAMILY MEDICINE CLINIC | Facility: CLINIC | Age: 80
End: 2025-01-25
Payer: MEDICARE

## 2025-01-25 VITALS
SYSTOLIC BLOOD PRESSURE: 132 MMHG | HEART RATE: 57 BPM | OXYGEN SATURATION: 100 % | DIASTOLIC BLOOD PRESSURE: 84 MMHG | WEIGHT: 111.4 LBS | BODY MASS INDEX: 19.02 KG/M2 | HEIGHT: 64 IN

## 2025-01-25 DIAGNOSIS — J20.9 ACUTE BRONCHITIS, UNSPECIFIED ORGANISM: Primary | ICD-10-CM

## 2025-01-25 PROCEDURE — 3075F SYST BP GE 130 - 139MM HG: CPT | Performed by: FAMILY MEDICINE

## 2025-01-25 PROCEDURE — 99213 OFFICE O/P EST LOW 20 MIN: CPT | Performed by: FAMILY MEDICINE

## 2025-01-25 PROCEDURE — 3079F DIAST BP 80-89 MM HG: CPT | Performed by: FAMILY MEDICINE

## 2025-01-25 PROCEDURE — 1126F AMNT PAIN NOTED NONE PRSNT: CPT | Performed by: FAMILY MEDICINE

## 2025-01-25 RX ORDER — PREDNISONE 20 MG/1
40 TABLET ORAL DAILY
Qty: 10 TABLET | Refills: 0 | Status: SHIPPED | OUTPATIENT
Start: 2025-01-25 | End: 2025-01-30

## 2025-01-25 RX ORDER — DEXTROMETHORPHAN HYDROBROMIDE AND PROMETHAZINE HYDROCHLORIDE 15; 6.25 MG/5ML; MG/5ML
5 SYRUP ORAL 4 TIMES DAILY PRN
Qty: 180 ML | Refills: 0 | Status: SHIPPED | OUTPATIENT
Start: 2025-01-25

## 2025-01-25 RX ORDER — CIPROFLOXACIN 500 MG/1
500 TABLET, FILM COATED ORAL 2 TIMES DAILY
COMMUNITY
End: 2025-01-25

## 2025-01-25 NOTE — PROGRESS NOTES
Office Note     Name: Holly Davis    : 1945     MRN: [id number]     Chief Complaint  Cough (Fatigue, fever for 3-4 days, symptoms began 10 days ago)    Subjective       Holly Davis is a 79 y.o. female.     The patient presents with a persistent dry cough for 10 days, accompanied by chest rattling. She had a maximum temperature of 101°F but currently no fever. Despite slight improvement, she feels unwell and weak. She reports minimal expectoration, no significant head congestion, sinus issues, respiratory distress, wheezing, or chest tightness, but a persistent congested cough. She had a brief episode of diarrhea, likely food-related. No known penicillin allergies and tolerates steroids well. History of C. difficile infection post-cefdinir treatment, requiring hospitalization. Self-medicating with old Rx of Cipro for 4-5 days without significant relief. Took Robitussin-DM and ibuprofen for fever management, initially effective but less so upon resumption. Took Tamiflu for 1.5 weeks at symptom onset with some relief.    Supplemental Information  Scheduled for total knee replacement on 2025, requires primary care clearance. Last appointment 4-5 months ago.    ALLERGIES  Allergies discussed during this visit.  Medication allergies: SULFA, reaction: -, severity: -.  Food allergies:   Environmental allergies:   Other allergies:     MEDICATIONS  - Cipro  - Robitussin-DM  - Ibuprofen  - Tamiflu    Review of Systems:   Review of Systems   Constitutional:  Positive for fever (up to 101 F). Negative for appetite change, chills and fatigue.   HENT:  Negative for congestion, ear pain, postnasal drip, rhinorrhea, sinus pressure, sneezing and sore throat.    Respiratory:  Positive for cough. Negative for chest tightness and shortness of breath.    Gastrointestinal:  Negative for diarrhea, nausea and vomiting.   Musculoskeletal:  Negative for myalgias.   Neurological:  Negative for headache.        Past Medical History:   Past Medical History:   Diagnosis Date    Allergic 2011    Versed    Alopecia 2019    Cataract     Chronic constipation 1972    Coronary artery disease 1955?    Tachycardia (controlled)    COVID-19 virus infection 05/14/2022    Essential hypertension 2018    HL (hearing loss) 2023    Prescription for heating aids    Hyperlipidemia LDL goal <100 2015    Low back pain 2021    Melanoma (HCC) of left shoulder 02/2022    LN's were (-)    Monoclonal gammopathy 2006    Osteopenia of multiple sites 2014    Sinus tachycardia 1963    Stress fracture of left foot 2006    Stress fracture of right ankle 2000    Visual impairment        Past Surgical History:   Past Surgical History:   Procedure Laterality Date    APPENDECTOMY  11/24/2007    BREAST EXCISIONAL BIOPSY Left 1980    CATARACT EXTRACTION W/ INTRAOCULAR LENS  IMPLANT, BILATERAL Right 2006    CATARACT EXTRACTION W/ INTRAOCULAR LENS  IMPLANT, BILATERAL Left 2007    COLONOSCOPY  11/2022    ENDOSCOPY N/A 12/23/2019    Procedure: ESOPHAGOGASTRODUODENOSCOPY WITH DILATATION;  Surgeon: Brunner, Mark I, MD;  Location:  [facility];  Service: Gastroenterology    ENDOSCOPY N/A 01/28/2020    Procedure: ESOPHAGOGASTRODUODENOSCOPY WITH DILITATION;  Surgeon: Brunner, Mark I, MD;  Location:  [facility];  Service: Gastroenterology    ENDOSCOPY WITH FOREIGN BODY REMOVAL N/A 12/01/2019    Procedure: ESOPHAGOGASTRODUODENOSCOPY WITH FOREIGN BODY REMOVAL;  Surgeon: Brunner, Mark I, MD;  Location:  [facility];  Service: Gastroenterology    EYE SURGERY  2006 & 2007    Cataract surgery    JOINT REPLACEMENT  2017& 2018    Partial knee replacement-left and right    LAPAROSCOPIC APPENDECTOMY  11/2007    LUMBAR DISCECTOMY  07/2011    minimally invasive lumbar decompression    PARTIAL KNEE ARTHROPLASTY Left 04/2018    PARTIAL KNEE ARTHROPLASTY Right 06/2017    SKIN CANCER EXCISION  02/2014    Melanoma on left shoulder    TOTAL ABDOMINAL HYSTERECTOMY WITH  SALPINGO OOPHORECTOMY Bilateral 1996    Dr. Page - done for bleeding       Family History:   Family History   Problem Relation Age of Onset    Arthritis Mother     Breast cancer Maternal Aunt 64    Breast cancer Paternal Cousin 60    Breast cancer Maternal Cousin 65    Ovarian cancer Neg Hx        Social History:   Social History     Socioeconomic History    Marital status:    Tobacco Use    Smoking status: Never     Passive exposure: Never    Smokeless tobacco: Never   Vaping Use    Vaping status: Never Used   Substance and Sexual Activity    Alcohol use: Yes     Alcohol/week: 5.0 standard drinks of alcohol     Types: 3 Glasses of wine, 2 Cans of beer per week     Comment: Social    Drug use: Never    Sexual activity: Yes     Partners: Male     Birth control/protection: Post-menopausal, Hysterectomy, Surgical       Immunizations:   Immunization History   Administered Date(s) Administered    ABRYSVO (RSV, 60+ or pregnant women 32-36 wks) 12/20/2024    COVID-19 (PFIZER) 12YRS+ (COMIRNATY) 11/28/2023, 09/24/2024    COVID-19 (PFIZER) BIVALENT 12+YRS 10/25/2022, 04/22/2023    COVID-19 (PFIZER) Purple Cap Monovalent 01/24/2021, 02/20/2021, 08/20/2021, 04/08/2022    Covid-19 (Pfizer) Gray Cap Monovalent 04/08/2022    FLUAD TRI 65YR+ 09/24/2024    Fluad Quad 65+ 10/07/2020, 11/28/2023    Fluzone High-Dose 65+YRS 10/14/2016, 11/02/2021    Fluzone High-Dose 65+yrs 10/25/2022    Fluzone Quad >6mos (Multi-dose) 10/20/2015, 10/22/2018, 10/08/2019    Pneumococcal Conjugate 13-Valent (PCV13) 10/20/2015    Pneumococcal Polysaccharide (PPSV23) 07/20/2021    Shingrix 07/17/2023, 08/16/2024        Medications:     Current Outpatient Medications:     Ascorbic Acid (Vitamin C) 500 MG capsule, Take  by mouth Daily., Disp: , Rfl:     atropine 1 % ophthalmic solution, 1 drop As Needed. Pt uses under tongue, Disp: , Rfl:     azelastine (ASTELIN) 0.1 % nasal spray, 2 sprays into the nostril(s) as directed by provider 2 (Two) Times  a Day. Use in each nostril as directed, Disp: 30 mL, Rfl: 2    Calcium 200 MG tablet, Take  by mouth., Disp: , Rfl:     celecoxib (CeleBREX) 100 MG capsule, Take 1 capsule by mouth 2 (Two) Times a Day., Disp: , Rfl:     Cholecalciferol (VITAMIN D-3 PO), Take  by mouth., Disp: , Rfl:     denosumab (Prolia) 60 MG/ML solution prefilled syringe syringe, Inject  under the skin into the appropriate area as directed 1 (One) Time., Disp: , Rfl:     estradiol (ESTRACE) 1 MG tablet, Take 1 tablet by mouth Daily., Disp: , Rfl:     fluticasone (FLONASE) 50 MCG/ACT nasal spray, Administer 2 sprays into the nostril(s) as directed by provider Daily., Disp: , Rfl:     fluvastatin XL (LESCOL XL) 80 MG 24 hr tablet, Take 1 tablet by mouth Daily., Disp: , Rfl:     lisinopril (PRINIVIL,ZESTRIL) 20 MG tablet, Take 1 tablet by mouth Daily., Disp: 90 tablet, Rfl: 3    metoprolol succinate XL (TOPROL-XL) 25 MG 24 hr tablet, Take 1 tablet by mouth Daily., Disp: 90 tablet, Rfl: 3    omeprazole (priLOSEC) 40 MG capsule, Take 1 capsule by mouth Daily., Disp: 90 capsule, Rfl: 1    oxyCODONE-acetaminophen (Percocet) 5-325 MG per tablet, Take 1 tablet by mouth As Needed., Disp: , Rfl:     Probiotic Product (PROBIOTIC DAILY PO), Take 1 tablet by mouth Daily., Disp: , Rfl:     Probiotic, Lactobacillus, capsule, Take  by mouth Daily., Disp: , Rfl:     RESTASIS 0.05 % ophthalmic emulsion, Administer 1 drop to both eyes 2 (Two) Times a Day., Disp: , Rfl: 3    sodium-potassium-magnesium sulfates (SUPREP) 17.5-3.13-1.6 GM/177ML solution oral solution, Take 1 bottle by mouth 1 (One) Time., Disp: , Rfl:     temazepam (RESTORIL) 15 MG capsule, Take 1 capsule by mouth Every Night., Disp: 90 capsule, Rfl: 0    traZODone (DESYREL) 100 MG tablet, TAKE ONE TABLET BY MOUTH ONCE NIGHTLY, Disp: 90 tablet, Rfl: 0    VITAMIN K PO, Take  by mouth., Disp: , Rfl:     amoxicillin-clavulanate (AUGMENTIN) 875-125 MG per tablet, Take 1 tablet by mouth 2 (Two) Times a Day.,  "Disp: 20 tablet, Rfl: 0    predniSONE (DELTASONE) 20 MG tablet, Take 2 tablets by mouth Daily for 5 days., Disp: 10 tablet, Rfl: 0    promethazine-dextromethorphan (PROMETHAZINE-DM) 6.25-15 MG/5ML syrup, Take 5 mL by mouth 4 (Four) Times a Day As Needed for Cough., Disp: 180 mL, Rfl: 0    Allergies:   Allergies   Allergen Reactions    2,4-D Dimethylamine Rash    Cefdinir Diarrhea, Other (See Comments) and Unknown (See Comments)     \"OMNICEF (CEFDINIR) GAVE ME C-DIFF-SEVERAL YEARS AGO\"    Other reaction(s): Other (See Comments), Other (See Comments)    Other reaction(s): Other, Unknown    \"OMNICEF (CEFDINIR) GAVE ME C-DIFF-SEVERAL YEARS AGO\"      Other reaction(s): Other (See Comments), Other (See Comments)      Other reaction(s): Other, Unknown      \"OMNICEF (CEFDINIR) GAVE ME C-DIFF-SEVERAL YEARS AGO\"  Other reaction(s): Other (See Comments), Other (See Comments)  Other reaction(s): Other, Unknown    Midazolam Other (See Comments), Shortness Of Breath, Unknown (See Comments) and Hives     \"LAST TIME TOOK VERSED,COULD NOT BREATHE,WAS GIVEN REVERSAL DRUG\"    Other reaction(s): Other (See Comments), Other (See Comments)    Other reaction(s): Other, Unknown    \"LAST TIME TOOK VERSED,COULD NOT BREATHE,WAS GIVEN REVERSAL DRUG\"      Other reaction(s): Other (See Comments), Other (See Comments)      Other reaction(s): Other, Unknown      \"LAST TIME TOOK VERSED,COULD NOT BREATHE,WAS GIVEN REVERSAL DRUG\"  Other reaction(s): Other (See Comments), Other (See Comments)  Other reaction(s): Other, Unknown    Other Hives     OMNI IV  OMNI IV  OMNI IV    Midazolam Hcl Hives    Sulfa Antibiotics Rash and Unknown (See Comments)    Trimethoprim Unknown (See Comments), Rash and Hives     Other reaction(s): Unknown (See Comments)  Other reaction(s): Unknown (See Comments)  Other reaction(s): Unknown (See Comments)  Other reaction(s): Unknown (See Comments)  Other reaction(s): Unknown (See Comments)         Objective     Vital Signs  BP " "132/84 (BP Location: Right arm, Patient Position: Sitting, Cuff Size: Adult)   Pulse 57   Ht 162.6 cm (64\")   Wt 50.5 kg (111 lb 6.4 oz)   SpO2 100%   BMI 19.12 kg/m²   Estimated body mass index is 19.12 kg/m² as calculated from the following:    Height as of this encounter: 162.6 cm (64\").    Weight as of this encounter: 50.5 kg (111 lb 6.4 oz).    BMI is within normal parameters. No other follow-up for BMI required.      Physical Exam  Vitals and nursing note reviewed.   Constitutional:       General: She is not in acute distress.     Appearance: Normal appearance. She is not ill-appearing.   HENT:      Head: Normocephalic and atraumatic.   Cardiovascular:      Rate and Rhythm: Normal rate and regular rhythm.      Pulses: Normal pulses.      Heart sounds: Normal heart sounds.   Pulmonary:      Effort: Pulmonary effort is normal. No respiratory distress.      Breath sounds: Normal breath sounds.      Comments: Dry cough observed during visit.  Lungs are clear to auscultation. No wheezing detected.  Musculoskeletal:      Right lower leg: No edema.      Left lower leg: No edema.   Skin:     General: Skin is warm and dry.   Neurological:      General: No focal deficit present.      Mental Status: She is alert and oriented to person, place, and time.      Coordination: Coordination normal.      Gait: Gait normal.   Psychiatric:         Mood and Affect: Mood normal.         Behavior: Behavior normal.       Results:  No results found for this or any previous visit (from the past 24 hours).       Assessment and Plan       Diagnoses and all orders for this visit:    1. Acute bronchitis, unspecified organism (Primary)  Assessment & Plan:  Experiencing dry cough for 10 days with chest rattling. Cipro taken for 4-5 days without improvement. No current fever, feeling unwell with congested cough. Limited relief from Robitussin-DM and ibuprofen. Prescribed Augmentin, a short burst of steroids, and cough syrup for " symptomatic relief. Prescriptions sent to [facility] pharmacy [place] the [place] side.      Other orders  -     amoxicillin-clavulanate (AUGMENTIN) 875-125 MG per tablet; Take 1 tablet by mouth 2 (Two) Times a Day.  Dispense: 20 tablet; Refill: 0  -     predniSONE (DELTASONE) 20 MG tablet; Take 2 tablets by mouth Daily for 5 days.  Dispense: 10 tablet; Refill: 0  -     promethazine-dextromethorphan (PROMETHAZINE-DM) 6.25-15 MG/5ML syrup; Take 5 mL by mouth 4 (Four) Times a Day As Needed for Cough.  Dispense: 180 mL; Refill: 0        Follow Up  No follow-ups on file.    Valarie Wilder PA-C  [facility]

## 2025-01-25 NOTE — ASSESSMENT & PLAN NOTE
Experiencing dry cough for 10 days with chest rattling. Cipro taken for 4-5 days without improvement. No current fever, feeling unwell with congested cough. Limited relief from Robitussin-DM and ibuprofen. Prescribed Augmentin, a short burst of steroids, and cough syrup for symptomatic relief. Prescriptions sent to [facility] pharmacy on the east side.

## 2025-01-27 ENCOUNTER — HOSPITAL ENCOUNTER (OUTPATIENT)
Dept: MAMMOGRAPHY | Facility: HOSPITAL | Age: 80
Discharge: HOME | End: 2025-01-27
Admitting: OBSTETRICS & GYNECOLOGY
Payer: MEDICARE

## 2025-01-27 DIAGNOSIS — Z12.31 VISIT FOR SCREENING MAMMOGRAM: ICD-10-CM

## 2025-01-27 PROCEDURE — 77063 BREAST TOMOSYNTHESIS BI: CPT

## 2025-01-27 PROCEDURE — 77067 SCR MAMMO BI INCL CAD: CPT

## 2025-01-28 PROCEDURE — 77067 SCR MAMMO BI INCL CAD: CPT | Performed by: RADIOLOGY

## 2025-01-28 PROCEDURE — 77063 BREAST TOMOSYNTHESIS BI: CPT | Performed by: RADIOLOGY

## 2025-01-30 DIAGNOSIS — F51.04 PSYCHOPHYSIOLOGICAL INSOMNIA: ICD-10-CM

## 2025-01-30 RX ORDER — TRAZODONE HYDROCHLORIDE 100 MG/1
100 TABLET ORAL NIGHTLY
Qty: 90 TABLET | Refills: 0 | Status: SHIPPED | OUTPATIENT
Start: 2025-01-30

## 2025-01-30 NOTE — TELEPHONE ENCOUNTER
Caller: SusanHolly    Relationship: Self    Best call back number: [phone]     Requested Prescriptions:   Requested Prescriptions     Pending Prescriptions Disp Refills    traZODone (DESYREL) 100 MG tablet 90 tablet 0     Sig: Take 1 tablet by mouth Every Night.        Pharmacy where request should be sent: [facility] [id number] [address] AT [facility] [address] - [phone]  - [phone] FX     Last office visit with prescribing clinician: Visit date not found   Last telemedicine visit with prescribing clinician: Visit date not found   Next office visit with prescribing clinician: 3/13/2025     Additional details provided by patient: PATIENT IS COMPLETELY OUT OF THIS MEDICATION AND WILL BE GOING OUT OF THE STATE TODAY        Does the patient have less than a 3 day supply:  [x] Yes  [] No    Would you like a call back once the refill request has been completed: [] Yes [x] No    If the office needs to give you a call back, can they leave a voicemail: [] Yes [x] No    Madalyn Rodriguez Rep   01/30/25 10:27 EST

## 2025-03-12 ENCOUNTER — TELEPHONE (OUTPATIENT)
Dept: FAMILY MEDICINE CLINIC | Facility: CLINIC | Age: 80
End: 2025-03-12
Payer: MEDICARE

## 2025-03-13 ENCOUNTER — OFFICE VISIT (OUTPATIENT)
Dept: FAMILY MEDICINE CLINIC | Facility: CLINIC | Age: 80
End: 2025-03-13
Payer: MEDICARE

## 2025-03-13 VITALS
WEIGHT: 115 LBS | BODY MASS INDEX: 19.63 KG/M2 | HEIGHT: 64 IN | HEART RATE: 80 BPM | DIASTOLIC BLOOD PRESSURE: 80 MMHG | OXYGEN SATURATION: 98 % | SYSTOLIC BLOOD PRESSURE: 122 MMHG

## 2025-03-13 DIAGNOSIS — Z76.89 ENCOUNTER TO ESTABLISH CARE: Primary | ICD-10-CM

## 2025-03-13 DIAGNOSIS — F51.04 PSYCHOPHYSIOLOGICAL INSOMNIA: ICD-10-CM

## 2025-03-13 DIAGNOSIS — I10 ESSENTIAL HYPERTENSION: ICD-10-CM

## 2025-03-13 DIAGNOSIS — D69.6 THROMBOCYTOPENIA: ICD-10-CM

## 2025-03-13 RX ORDER — ESTRADIOL 10 UG/1
1 TABLET, FILM COATED VAGINAL 2 TIMES WEEKLY
COMMUNITY

## 2025-03-13 RX ORDER — TRAZODONE HYDROCHLORIDE 100 MG/1
100 TABLET ORAL NIGHTLY
Qty: 90 TABLET | Refills: 1 | Status: SHIPPED | OUTPATIENT
Start: 2025-03-13

## 2025-03-13 RX ORDER — TEMAZEPAM 15 MG/1
15 CAPSULE ORAL NIGHTLY
Qty: 90 CAPSULE | Refills: 1 | Status: SHIPPED | OUTPATIENT
Start: 2025-03-13

## 2025-03-13 NOTE — PROGRESS NOTES
New Patient Office Visit      Patient Name: Holly Davis  : 1945   MRN: [id number]     Chief Complaint:    Chief Complaint   Patient presents with    Establish Care       History of Present Illness: Holly Davis is a 79 y.o. female who is here today to establish.  Patient is concerned about bruising noted on her legs.  She has a history of low platelets and is concerned about this.  She follows with hematology and had labs ordered for 2024.  She has not had these labs drawn yet.  She also needs refills on her medications for insomnia.  She is not due to  a refill for a couple of weeks but would like to have the refills on file.  Otherwise she denies any significant complaints.    Subjective      Review of Systems:   Review of Systems   Skin:  Positive for color change.   Psychiatric/Behavioral:  Positive for sleep disturbance.        Past Medical History:   Past Medical History:   Diagnosis Date    Allergic     Versed    Alopecia     Cataract     Chronic constipation 1972    Coronary artery disease ?    Tachycardia (controlled)    COVID-19 virus infection 2022    Essential hypertension 2018    HL (hearing loss)     Prescription for heating aids    Hyperlipidemia LDL goal <100 2015    Low back pain     Melanoma (HCC) of left shoulder 2022    LN's were (-)    Monoclonal gammopathy 2006    Osteopenia of multiple sites 2014    Sinus tachycardia 1963    Stress fracture of left foot 2006    Stress fracture of right ankle 2000    Visual impairment        Past Surgical History:   Past Surgical History:   Procedure Laterality Date    APPENDECTOMY  2007    BREAST EXCISIONAL BIOPSY Left     CATARACT EXTRACTION W/ INTRAOCULAR LENS  IMPLANT, BILATERAL Right     CATARACT EXTRACTION W/ INTRAOCULAR LENS  IMPLANT, BILATERAL Left     COLONOSCOPY  2022    ENDOSCOPY N/A 2019    Procedure: ESOPHAGOGASTRODUODENOSCOPY WITH DILATATION;   Surgeon: Brunner, Mark I, MD;  Location:  [facility];  Service: Gastroenterology    ENDOSCOPY N/A 01/28/2020    Procedure: ESOPHAGOGASTRODUODENOSCOPY WITH DILITATION;  Surgeon: Brunner, Mark I, MD;  Location:  [facility];  Service: Gastroenterology    ENDOSCOPY WITH FOREIGN BODY REMOVAL N/A 12/01/2019    Procedure: ESOPHAGOGASTRODUODENOSCOPY WITH FOREIGN BODY REMOVAL;  Surgeon: Brunner, Mark I, MD;  Location:  [facility];  Service: Gastroenterology    EYE SURGERY  2006 & 2007    Cataract surgery    JOINT REPLACEMENT  2017& 2018    Partial knee replacement-left and right    LAPAROSCOPIC APPENDECTOMY  11/2007    LUMBAR DISCECTOMY  07/2011    minimally invasive lumbar decompression    PARTIAL KNEE ARTHROPLASTY Left 04/2018    PARTIAL KNEE ARTHROPLASTY Right 06/2017    SKIN CANCER EXCISION  02/2014    Melanoma on left shoulder    TOTAL ABDOMINAL HYSTERECTOMY WITH SALPINGO OOPHORECTOMY Bilateral 1996    Dr. Page - done for bleeding       Family History:   Family History   Problem Relation Age of Onset    Arthritis Mother     Breast cancer Maternal Aunt 64    Breast cancer Paternal Cousin 60    Breast cancer Maternal Cousin 65    Ovarian cancer Neg Hx        Social History:   Social History     Socioeconomic History    Marital status:    Tobacco Use    Smoking status: Never     Passive exposure: Never    Smokeless tobacco: Never   Vaping Use    Vaping status: Never Used   Substance and Sexual Activity    Alcohol use: Yes     Alcohol/week: 4.0 standard drinks of alcohol     Types: 3 Glasses of wine, 1 Cans of beer per week     Comment: Social    Drug use: Never    Sexual activity: Yes     Partners: Male     Birth control/protection: Post-menopausal, Hysterectomy, Surgical       Medications:     Current Outpatient Medications:     Ascorbic Acid (Vitamin C) 500 MG capsule, Take  by mouth Daily., Disp: , Rfl:     atropine 1 % ophthalmic solution, 1 drop As Needed. Pt uses under tongue, Disp: , Rfl:      azelastine (ASTELIN) 0.1 % nasal spray, 2 sprays into the nostril(s) as directed by provider 2 (Two) Times a Day. Use in each nostril as directed, Disp: 30 mL, Rfl: 2    Calcium 200 MG tablet, Take  by mouth., Disp: , Rfl:     celecoxib (CeleBREX) 100 MG capsule, Take 1 capsule by mouth 2 (Two) Times a Day., Disp: , Rfl:     Cholecalciferol (VITAMIN D-3 PO), Take  by mouth., Disp: , Rfl:     denosumab (Prolia) 60 MG/ML solution prefilled syringe syringe, Inject  under the skin into the appropriate area as directed 1 (One) Time., Disp: , Rfl:     fluticasone (FLONASE) 50 MCG/ACT nasal spray, Administer 2 sprays into the nostril(s) as directed by provider Daily., Disp: , Rfl:     fluvastatin XL (LESCOL XL) 80 MG 24 hr tablet, Take 1 tablet by mouth Daily., Disp: , Rfl:     lisinopril (PRINIVIL,ZESTRIL) 20 MG tablet, Take 1 tablet by mouth Daily., Disp: 90 tablet, Rfl: 3    metoprolol succinate XL (TOPROL-XL) 25 MG 24 hr tablet, Take 1 tablet by mouth Daily., Disp: 90 tablet, Rfl: 3    omeprazole (priLOSEC) 40 MG capsule, Take 1 capsule by mouth Daily., Disp: 90 capsule, Rfl: 1    oxyCODONE-acetaminophen (Percocet) 5-325 MG per tablet, Take 1 tablet by mouth As Needed., Disp: , Rfl:     Probiotic Product (PROBIOTIC DAILY PO), Take 1 tablet by mouth Daily., Disp: , Rfl:     Probiotic, Lactobacillus, capsule, Take  by mouth Daily., Disp: , Rfl:     RESTASIS 0.05 % ophthalmic emulsion, Administer 1 drop to both eyes 2 (Two) Times a Day., Disp: , Rfl: 3    temazepam (RESTORIL) 15 MG capsule, Take 1 capsule by mouth Every Night., Disp: 90 capsule, Rfl: 1    traZODone (DESYREL) 100 MG tablet, Take 1 tablet by mouth Every Night., Disp: 90 tablet, Rfl: 1    VITAMIN K PO, Take  by mouth., Disp: , Rfl:     estradiol (VAGIFEM) 10 MCG tablet vaginal tablet, Insert 1 tablet into the vagina 2 (Two) Times a Week., Disp: , Rfl:     Allergies:   Allergies   Allergen Reactions    2,4-D Dimethylamine Rash    Cefdinir Diarrhea, Other  "(See Comments) and Unknown (See Comments)     \"OMNICEF (CEFDINIR) GAVE ME C-DIFF-SEVERAL YEARS AGO\"    Other reaction(s): Other (See Comments), Other (See Comments)    Other reaction(s): Other, Unknown    \"OMNICEF (CEFDINIR) GAVE ME C-DIFF-SEVERAL YEARS AGO\"      Other reaction(s): Other (See Comments), Other (See Comments)      Other reaction(s): Other, Unknown      \"OMNICEF (CEFDINIR) GAVE ME C-DIFF-SEVERAL YEARS AGO\"  Other reaction(s): Other (See Comments), Other (See Comments)  Other reaction(s): Other, Unknown    Midazolam Other (See Comments), Shortness Of Breath, Unknown (See Comments) and Hives     \"LAST TIME TOOK VERSED,COULD NOT BREATHE,WAS GIVEN REVERSAL DRUG\"    Other reaction(s): Other (See Comments), Other (See Comments)    Other reaction(s): Other, Unknown    \"LAST TIME TOOK VERSED,COULD NOT BREATHE,WAS GIVEN REVERSAL DRUG\"      Other reaction(s): Other (See Comments), Other (See Comments)      Other reaction(s): Other, Unknown      \"LAST TIME TOOK VERSED,COULD NOT BREATHE,WAS GIVEN REVERSAL DRUG\"  Other reaction(s): Other (See Comments), Other (See Comments)  Other reaction(s): Other, Unknown    Other Hives     OMNI IV  OMNI IV  OMNI IV    Midazolam Hcl Hives    Sulfa Antibiotics Rash and Unknown (See Comments)    Trimethoprim Unknown (See Comments), Rash and Hives     Other reaction(s): Unknown (See Comments)  Other reaction(s): Unknown (See Comments)  Other reaction(s): Unknown (See Comments)  Other reaction(s): Unknown (See Comments)  Other reaction(s): Unknown (See Comments)         Objective     Physical Exam:  Vital Signs:   Vitals:    03/13/25 0937   BP: 122/80   BP Location: Left arm   Patient Position: Sitting   Cuff Size: Adult   Pulse: 80   SpO2: 98%   Weight: 52.2 kg (115 lb)   Height: 162.6 cm (64\")     Body mass index is 19.74 kg/m².   Facility age limit for growth %peyton is 20 years.    Physical Exam  Vitals and nursing note reviewed.   Constitutional:       Appearance: Normal " appearance. She is not ill-appearing.   Cardiovascular:      Rate and Rhythm: Normal rate and regular rhythm.   Pulmonary:      Effort: Pulmonary effort is normal.      Breath sounds: Normal breath sounds.   Skin:     Findings: Ecchymosis present.      Comments: Bruising noted on both shins.   Neurological:      Mental Status: She is alert.         Procedures    PHQ-9 Total Score:      Assessment / Plan      Assessment/Plan:   Assessment & Plan  Encounter to establish care  Past medical history and medications reviewed with patient.       Thrombocytopenia  Evidently hematology had ordered labs in December.  She has not had these drawn yet.  Will go ahead and check a CBC today but recommend she follow-up with hematology regarding thrombocytopenia and consider getting the hematology labs completed.  Orders:    CBC Auto Differential; Future    Psychophysiological insomnia  Stable.  Continue trazodone and temazepam.    Orders:    traZODone (DESYREL) 100 MG tablet; Take 1 tablet by mouth Every Night.    temazepam (RESTORIL) 15 MG capsule; Take 1 capsule by mouth Every Night.    Essential hypertension  Stable.  Continue current management.                BMI is within normal parameters. No other follow-up for BMI required.      Follow Up:   Return in about 6 months (around 9/13/2025) for Recheck, Medicare Wellness.      CLARITA Dia MD  [facility]

## 2025-03-13 NOTE — ASSESSMENT & PLAN NOTE
Stable.  Continue trazodone and temazepam.    Orders:    traZODone (DESYREL) 100 MG tablet; Take 1 tablet by mouth Every Night.    temazepam (RESTORIL) 15 MG capsule; Take 1 capsule by mouth Every Night.

## 2025-03-14 LAB
BASOPHILS # BLD AUTO: 0 X10E3/UL (ref 0–0.2)
BASOPHILS NFR BLD AUTO: 1 %
EOSINOPHIL # BLD AUTO: 0.1 X10E3/UL (ref 0–0.4)
EOSINOPHIL NFR BLD AUTO: 2 %
ERYTHROCYTE [DISTWIDTH] IN BLOOD BY AUTOMATED COUNT: 14.6 % (ref 11.7–15.4)
HCT VFR BLD AUTO: 38.2 % (ref 34–46.6)
HGB BLD-MCNC: 11.9 G/DL (ref 11.1–15.9)
IMM GRANULOCYTES # BLD AUTO: 0 X10E3/UL (ref 0–0.1)
IMM GRANULOCYTES NFR BLD AUTO: 0 %
LYMPHOCYTES # BLD AUTO: 1.2 X10E3/UL (ref 0.7–3.1)
LYMPHOCYTES NFR BLD AUTO: 32 %
MCH RBC QN AUTO: 28.8 PG (ref 26.6–33)
MCHC RBC AUTO-ENTMCNC: 31.2 G/DL (ref 31.5–35.7)
MCV RBC AUTO: 93 FL (ref 79–97)
MONOCYTES # BLD AUTO: 0.4 X10E3/UL (ref 0.1–0.9)
MONOCYTES NFR BLD AUTO: 11 %
MORPHOLOGY BLD-IMP: ABNORMAL
NEUTROPHILS # BLD AUTO: 2 X10E3/UL (ref 1.4–7)
NEUTROPHILS NFR BLD AUTO: 54 %
PLATELET # BLD AUTO: 98 X10E3/UL (ref 150–450)
RBC # BLD AUTO: 4.13 X10E6/UL (ref 3.77–5.28)
WBC # BLD AUTO: 3.7 X10E3/UL (ref 3.4–10.8)

## 2025-04-10 ENCOUNTER — OFFICE VISIT (OUTPATIENT)
Dept: OBSTETRICS AND GYNECOLOGY | Facility: CLINIC | Age: 80
End: 2025-04-10
Payer: MEDICARE

## 2025-04-10 VITALS
BODY MASS INDEX: 19.74 KG/M2 | DIASTOLIC BLOOD PRESSURE: 70 MMHG | SYSTOLIC BLOOD PRESSURE: 126 MMHG | RESPIRATION RATE: 14 BRPM | WEIGHT: 115 LBS

## 2025-04-10 DIAGNOSIS — M81.0 AGE-RELATED OSTEOPOROSIS WITHOUT CURRENT PATHOLOGICAL FRACTURE: ICD-10-CM

## 2025-04-10 DIAGNOSIS — Z71.85 VACCINE COUNSELING: ICD-10-CM

## 2025-04-10 DIAGNOSIS — Z01.419 WELL WOMAN EXAM: Primary | ICD-10-CM

## 2025-04-10 PROBLEM — J20.9 ACUTE BRONCHITIS: Status: RESOLVED | Noted: 2025-01-25 | Resolved: 2025-04-10

## 2025-04-10 NOTE — PATIENT INSTRUCTIONS

## 2025-04-10 NOTE — PROGRESS NOTES
Subjective   Chief Complaint   Patient presents with    Gynecologic Exam     Holly Davis is a 79 y.o. year old  who is post-menopausal.  She is S/P hysterectomy presenting to be seen for her annual exam.      This past year she has not been on hormone replacement therapy.  There has not been vaginal bleeding in the last 12 months.  Menopausal symptoms are not present.    SEXUAL Hx:  She is currently sexually active.  In the past year there there has been NO new sexual partners.    Condoms are never used.  She would not like to be screened for STD's at today's exam.  Pease is painful: no  HEALTH Hx:  She exercises regularly: yes.  She wears her seat belt: yes.  She has concerns about domestic violence: no.    The following portions of the patient's history were reviewed and updated as appropriate:problem list, current medications, allergies, past family history, past medical history, past social history, and past surgical history.    Social History    Tobacco Use      Smoking status: Never        Passive exposure: Never      Smokeless tobacco: Never    Review of Systems  Constitutional POS: nothing reported    NEG: anorexia or night sweats   Genitourinary POS: nothing reported    NEG: dysuria or hematuria   Gastointestinal POS: nothing reported    NEG: bloating, change in bowel habits, melena, or reflux symptoms   Integument POS: nothing reported and she sees her dermatologist for routine skins exams    NEG: moles that are changing in size, shape, color or rashes   Breast POS: nothing reported    NEG: persistent breast lump, skin dimpling, or nipple discharge        Objective   /70   Resp 14   Wt 52.2 kg (115 lb)   LMP  (LMP Unknown)   BMI 19.74 kg/m²     General:  well developed; well nourished  no acute distress  mentation appropriate   Skin:  No suspicious lesions seen   Thyroid: normal to inspection and palpation   Breasts:  Examined in supine position  Symmetric without masses or  skin dimpling  Nipples normal without inversion, lesions or discharge  There are no palpable axillary nodes   Abdomen: soft, non-tender; no masses  no umbilical or inguinal hernias are present  no hepato-splenomegaly   Pelvis: Clinical staff was present for exam  External genitalia:  normal appearance of the external genitalia including Bartholin's and Le Roy's glands.  :  urethral meatus normal; urethra normal:  Vaginal:  atrophic mucosal changes are present;  Cervix:  absent.  Uterus:  absent.  Adnexa:  absent, bilateral.  Rectal:  digital rectal exam not performed; anus visually normal appearing.        Assessment   Normal GYN exam S/P JANETTE w/ BSO  Osteoporosis currently on Prolia and followed by outside MD LYNN followed with hematology/oncology - ASHA and followed by Rah  Menopausal female currently not on HRT - without significant symptoms affecting activities of daily living  She is up to date on all relevant gynecologic and colorectal screenings       Plan   Pap was not done today.  I explained to Holly that the Pap smears are no longer recommended in patient's after hysterectomy.   I stressed to Holly that she still should be seen to be seen yearly for a full physical including breast and pelvic exam.  Regarding breast cancer screening by yearly mammogram in women aged 75 years of age and older, I reviewed the USPSTF's recommendations.  The USPSTF concludes that the current evidence is insufficient to assess the balance of benefits and harms of screening mammography in women aged 75 years or older.  After hearing this information she is interested in continuing with yearly mammograms.  Regardless of her decision to continue to participate in screening, she should report any palpable breast lump(s) or skin changes regardless of mammographic findings.  If she chooses to continue with breast cancer screening, I explained to Holly that notification regarding her mammogram results will come from the center  performing the study.  Our office will not be routinely calling with mammogram results.  It is her responsibility to make sure that the results from the mammogram are communicated to her by the breast center.  If she has any questions about the results, she is welcome to call our office anytime.  Regarding colonoscopy and colon cancer screening after 75, I reviewed the data from the USPSTF.  The USPSTF recommends that clinicians selectively offer screening for colorectal cancer by colonoscopy in adults aged 76 to 85 years. Evidence indicates that the net benefit of screening all persons in this age group is small. In determining whether this service is appropriate in individual cases, patients and clinicians should consider the patient's overall health, prior screening history, and  preferences.  After hearing this information she wishes to continue to participate in colon cancer screening.  I have counseled the patient on the importance of staying up to date with preventive vaccines as well as the risks and benefits of these vaccines.  Her vaccine record was reviewed and updated.  I discussed with Holly that she may be behind on needed vaccinations for TDAP.  She may be able to obtain these vaccinations at her local pharmacy OR speak about obtaining them with her primary care.  If she does obtain her vaccines, I have asked Holly to let us know the date each vaccine was obtained so that her medical record could be updated in our system.  The importance of keeping all planned follow-up and taking all medications as prescribed was emphasized.  Follow up for annual exam 1 year           This note was electronically signed.    Adama Escobar M.D.  April 10, 2025    Part of this note may be an electronic transcription/translation of spoken language to printed text using the Dragon Dictation System.

## 2025-06-16 ENCOUNTER — TELEPHONE (OUTPATIENT)
Dept: FAMILY MEDICINE CLINIC | Facility: CLINIC | Age: 80
End: 2025-06-16
Payer: MEDICARE

## 2025-06-16 DIAGNOSIS — R25.1 TREMOR: ICD-10-CM

## 2025-06-16 DIAGNOSIS — R26.9 GAIT DISTURBANCE: Primary | ICD-10-CM

## 2025-06-16 NOTE — TELEPHONE ENCOUNTER
Caller: Holly Davis    Relationship: Self    Best call back number: [phone]    What is the medical concern/diagnosis: MOVEMENT DISORDER    What specialty or service is being requested: NEUROLOGY     What is the provider, practice or medical service name: DR.JOHN TA     What is the office location: [facility]     What is the office phone number: [phone]  FAX# [phone]     Any additional details: ATTN TIMMY LOVETT  PATIENT IS REQUESTING JUST THIS PROVIDER.

## 2025-06-17 ENCOUNTER — TELEPHONE (OUTPATIENT)
Dept: FAMILY MEDICINE CLINIC | Facility: CLINIC | Age: 80
End: 2025-06-17
Payer: MEDICARE

## 2025-06-17 NOTE — TELEPHONE ENCOUNTER
Caller: Holly Davis    Relationship: Self    Best call back number: [phone]     Requested Prescriptions: MIEBO EYE DROP     Last office visit with prescribing clinician: 3/13/2025   Last telemedicine visit with prescribing clinician: Visit date not found   Next office visit with prescribing clinician: 9/15/2025     Additional details provided by patient: PATIENT IS GOING ON A CRUISE STARTING JUNE 25 AND WOULD LIKE TO SEE IF SHE COULD GET AN EARLY REFILL.    Does the patient have less than a 3 day supply:  [x] Yes  [] No    Would you like a call back once the refill request has been completed: [] Yes [x] No    If the office needs to give you a call back, can they leave a voicemail: [] Yes [x] No    Madalyn Mustafa   06/17/25 15:40 EDT

## 2025-06-17 NOTE — TELEPHONE ENCOUNTER
Called patient she picked them up.   SUBJECTIVE:     Daysi Ponce is a 81 year old female, who is here today with follow-up of her moderate persistent asthma, and ventral hernia.    Patient states that her asthma has gotten worse recently after she stopped taking Trelegy.  She is having burning in her chest and thought it was the Trelegy.  However the patient did have a positive cardiac stress test which did show some ischemia.  She states that since we placed her on omeprazole and since the cardiologist placed her on some oral nitrate her burning has gone away.  The patient states that she has really been affected by the pollen and smoke in the air recently and has been taking her albuterol inhaler up to every 3-4 hours some days.  Today now she is back to her baseline.  She states she denies any increased cough, sputum production or purulence.  She does not complain of any shortness of breath today.    Patient is following up with cardiology for her abnormal stress test.  They did recommend a cardiac catheterization but she did not want to do that.  They are going to try her out on oral nitrates along with metoprolol.  They are going to see her back in 2 weeks and see how she is doing and if she is not doing any better consideration of catheterization.    Patient states with regards to her ventral hernia the patient has been having some mild intermittent pain in it now.  She denies any nausea or vomiting.  She states that she has the same appetite.  Patient states it does not get very hard and she is easily able to reduce it.  She would like to have it fixed eventually.          REVIEW OF SYSTEMS:  Constitutional: Denies generalized fatigue, fever, sweats or chills  Respiratory: Denies current difficulty breathing, shortness of breath, hemoptysis, or wheezing  Cardiovascular:  Denies current chest pain, pressure, or palpitations  Gastrointestinal:  Denies change in appetite, current abdominal pain, nausea, vomiting, diarrhea, constipation, or  rectal bleeding.  Neurologic:  Denies headache, dizziness, syncopal episodes    ALLERGIES:    ALLERGIES:   Allergen Reactions    Labetalol SHORTNESS OF BREATH    Rosuvastatin SHORTNESS OF BREATH    Seasonal SHORTNESS OF BREATH     Allergic to Lebetalol    Penicillins RASH     Itchy, feverish    Sulfa Antibiotics RASH     fever    Morphine VOMITING and Nausea & Vomiting    Pollen     Pravastatin GI UPSET         MEDICATIONS:   Outpatient Medications Marked as Taking for the 6/17/25 encounter (Office Visit) with Darwin Guevara MD   Medication Sig Dispense Refill    albuterol 108 (90 Base) MCG/ACT inhaler Inhale 2 puffs into the lungs every 6 hours as needed for Shortness of Breath or Wheezing. 3 each 0    isosorbide mononitrate (IMDUR) 30 MG 24 hr tablet Take 1 tablet by mouth daily. 90 tablet 3    amLODIPine (NORVASC) 10 MG tablet TAKE 1 TABLET BY MOUTH DAILY 90 tablet 0    hydroCHLOROthiazide 25 MG tablet TAKE 1 TABLET BY MOUTH DAILY 90 tablet 0    omeprazole (PrilOSEC) 20 MG capsule Take 1 capsule by mouth daily. 90 capsule 3    Trelegy Ellipta 200-62.5-25 MCG/ACT inhaler USE 1 INHALATION BY MOUTH ONCE  DAILY AT THE SAME TIME EACH DAY 60 each 11    Multiple Vitamin (MULTIVITAMIN ADULT PO) Take by mouth daily.      fluticasone (FLONASE) 50 MCG/ACT nasal spray Spray 2 sprays in each nostril daily. - Nasal 16 g 1    clopidogrel (PLAVIX) 75 MG tablet TAKE 1 TABLET BY MOUTH DAILY 90 tablet 3    tamsulosin (FLOMAX) 0.4 MG Cap Take 1 capsule by mouth daily after a meal. 90 capsule 3    hydrALAZINE (APRESOLINE) 25 MG tablet Take 1 tablet by mouth every 8 hours. 270 tablet 3    losartan (COZAAR) 50 MG tablet Take 1 tablet by mouth in the morning and 1 tablet in the evening. 180 tablet 3    Cholecalciferol (Vitamin D-3) 25 mcg (1,000 units) capsule Take 2,000 Units by mouth daily. 1 capsule 0    Ferrous Sulfate (Iron) 28 MG Tab Take 28 mg by mouth daily.      azelastine (ASTELIN) 0.1 % nasal spray 2 sprays  in the morning and  2 sprays in the evening. Use in each nostril as directed      aspirin 81 MG tablet Take 81 mg by mouth daily.           HISTORIES  Past Medical History:   Diagnosis Date    Asthma (CMD)     Colon polyp 01/01/2003    DDD (degenerative disc disease), cervical 07/21/2015    Essential (primary) hypertension     Hx of bone density study     Hyperlipidemia     Pneumothorax     after a fall on steps    Renal artery stenosis (CMD)     Seasonal allergies 02/02/2013    Sinus bradycardia     Vitamin D deficiency        OBJECTIVE:  Constitutional:  Well developed, well nourished in no apparent distress, nontoxic appearance   Vitals:  Visit Vitals  /64   Pulse (!) 58   Resp 18   Ht 5' 3\" (1.6 m) Comment: reported   Wt 74.4 kg (164 lb) Comment: reported-declined scale   SpO2 95%   BMI 29.05 kg/m²    Body mass index is 29.05 kg/m².  Respiratory:  Lungs clear to auscultation. Normal aeration.  Normal respiratory effort.    Cardiovascular:  Regular rate and rhythm.  Normal S1 and S2 without murmurs, clicks, gallops.  Gastrointestinal:  Soft, nontender, nondistended, right-sided upper abdominal ventral hernia is easily reduced without any tenderness to palpation.    Psychiatric:  Alert and oriented x 3.  Speech and behavior appropriate.     Assessment and plan:      1. Moderate persistent asthma without complication (CMD)    2. Ventral hernia without obstruction or gangrene    3. 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  We did refill 3 more other albuterol inhalers.  She is going to call us in 2 weeks to tell us how she is doing on it.  Consideration of sending off to pulmonology may be made.    Coronary disease with stable angina.  Patient does have an abnormal cardiac stress test and she is trying oral medications initially but may end up proceeding onto cardiac catheterization.  She is going to be  calling the cardiologist in 2 weeks to let him know how she is doing.    Ventral hernia  Patient is becoming more symptomatic but at this point there is no sign of incarceration.  She knows the signs to watch for and then she is to go to the emergency room if they happen.  We will eventually get her to general surgery but I think both her heart and lungs need to be taken care of first.    Plan:  Restart [facility]  Follow-up with cardiology and also call us in 2 weeks to let us know how she is doing on the Trelegy  Albuterol inhaler sent into her mail order  Continue other medications the same    Orders Placed This Encounter    albuterol 108 (90 Base) MCG/ACT inhaler       No follow-ups on file.

## 2025-06-18 NOTE — TELEPHONE ENCOUNTER
"Patient contacted.    Per Dr. Dia \"I do not see this medication on her med list, nor am I familiar with this particular medication. She may want to get this from whoever initially prescribed it. \"    Patient stated understanding.  "

## 2025-06-19 DIAGNOSIS — F51.04 PSYCHOPHYSIOLOGICAL INSOMNIA: ICD-10-CM

## 2025-06-19 RX ORDER — TRAZODONE HYDROCHLORIDE 100 MG/1
100 TABLET ORAL NIGHTLY
Qty: 90 TABLET | Refills: 1 | Status: SHIPPED | OUTPATIENT
Start: 2025-06-19

## 2025-06-19 RX ORDER — TEMAZEPAM 15 MG/1
15 CAPSULE ORAL NIGHTLY
Qty: 90 CAPSULE | Refills: 1 | OUTPATIENT
Start: 2025-06-19

## 2025-06-19 NOTE — TELEPHONE ENCOUNTER
Caller: Holly Davis Tru    Relationship: Self    Best call back number: [phone]    Requested Prescriptions:   Requested Prescriptions     Pending Prescriptions Disp Refills    traZODone (DESYREL) 100 MG tablet 90 tablet 1     Sig: Take 1 tablet by mouth Every Night.    temazepam (RESTORIL) 15 MG capsule 90 capsule 1     Sig: Take 1 capsule by mouth Every Night.        Pharmacy where request should be sent: [facility] [id number] [address] AT [facility] [address] - [phone]  - [phone] FX     Last office visit with prescribing clinician: 3/13/2025   Last telemedicine visit with prescribing clinician: Visit date not found   Next office visit with prescribing clinician: Visit date not found     Additional details provided by patient: pt is requesting a refill, she will be leaving the country and will not have enough when she returns. Please advise    Does the patient have less than a 3 day supply:  [x] Yes  [] No    Would you like a call back once the refill request has been completed: [x] Yes [] No    If the office needs to give you a call back, can they leave a voicemail: [x] Yes [] No    Oneil France, PCT   06/19/25 08:46 EDT

## 2025-07-30 ENCOUNTER — READMISSION MANAGEMENT (OUTPATIENT)
Dept: CALL CENTER | Facility: HOSPITAL | Age: 80
End: 2025-07-30
Payer: MEDICARE

## 2025-07-30 NOTE — OUTREACH NOTE
Prep Survey      Flowsheet Row Responses   [facility] facility patient discharged from? Non-BH   Is LACE score < 7 ? Non-BH Discharge   Eligibility [facility]   Date of Admission 07/22/25   Date of Discharge 07/30/25   Discharge Disposition Home or Self Care   Discharge diagnosis Glioblastoma, Brain biopsy this visit   Does the patient have one of the following disease processes/diagnoses(primary or secondary)? General Surgery   Does the patient have Home health ordered? No   Prep survey completed? Yes            GLO AKHTAR - Registered Nurse

## 2025-07-31 ENCOUNTER — TRANSITIONAL CARE MANAGEMENT TELEPHONE ENCOUNTER (OUTPATIENT)
Dept: CALL CENTER | Facility: HOSPITAL | Age: 80
End: 2025-07-31
Payer: MEDICARE

## 2025-07-31 NOTE — OUTREACH NOTE
Call Center TCM Note      Flowsheet Row Responses   [facility] patient discharged from? Non-  []   Does the patient have one of the following disease processes/diagnoses(primary or secondary)? General Surgery   TCM attempt successful? Yes   Call start time 1143   Call end time 1145   Discharge diagnosis Glioblastoma, Brain biopsy this visit   Person spoke with today (if not patient) and relationship    Meds reviewed with patient/caregiver? Yes   Is the patient having any side effects they believe may be caused by any medication additions or changes? No   Does the patient have all medications ordered at discharge? Yes   Is the patient taking all medications as directed (includes completed medication regime)? Yes   Does the patient have an appointment with their PCP within 7-14 days of discharge? No   Nursing Interventions Patient declined scheduling/rescheduling appointment at this time, Patient desires to follow up with specialty only   Psychosocial issues? No   Did the patient receive a copy of their discharge instructions? Yes   What is the patient's perception of their health status since discharge? Improving   Is the patient/caregiver able to teach back signs and symptoms related to disease process for when to call PCP? Yes   Is the patient/caregiver able to teach back signs and symptoms related to disease process for when to call 911? Yes   Is the patient/caregiver able to teach back the hierarchy of who to call/visit for symptoms/problems? PCP, Specialist, Home health nurse, Urgent Care, ED, 911 Yes   If the patient is a current smoker, are they able to teach back resources for cessation? Not a smoker   TCM call completed? Yes   Wrap up additional comments Patient discharged from .  denies any needs at present time. She will followup with specialists.   Call end time 1145   Would this patient benefit from a Referral to [facility] Social Work? No   Is the patient interested in additional  calls from an ambulatory ? No            Arnol JAY - Registered Nurse    7/31/2025, 11:45 EDT

## (undated) DEVICE — THE BITE BLOCK MAXI, LATEX FREE STRAP IS USED TO PROTECT THE ENDOSCOPE INSERTION TUBE FROM BEING BITTEN BY THE PATIENT.

## (undated) DEVICE — INTRO ACCSR BLNT TP

## (undated) DEVICE — SYR LUERLOK 50ML

## (undated) DEVICE — CONTN GRAD MEAS TRIANG 32OZ BLK

## (undated) DEVICE — KT BIOGUARD SXN VLV AIR/H20 4PC DISP

## (undated) DEVICE — ENDOGATOR HYBRID TUBING KIT FOR USE WITH ENDOGATOR IRRIGATION PUMP, OLYMPUS PUMP, GI4000 ESU, AND TORRENT IRRIGATION PUMP.: Brand: ENDOGATOR KIT

## (undated) DEVICE — SOL IRR H2O BG 1000ML STRL

## (undated) DEVICE — SOL IRR NACL 0.9PCT BT 1000ML

## (undated) DEVICE — SPNG ENDO BEDSIDE TUB ENZYM

## (undated) DEVICE — HYBRID CO2 TUBING/CAP SET FOR OLYMPUS® SCOPES & CO2 SOURCE: Brand: ERBE

## (undated) DEVICE — THE DISPOSABLE ROTH NET FOREIGN BODY STANDARD RETRIEVAL DEVICE IS USED IN THE ENDOSCOPIC RETRIEVAL OF FOREIGN BODY, FOOD BOLUS AND EXCISED TISSUE SUCH AS POLYPS.: Brand: ROTH NET

## (undated) DEVICE — MSK ENDO PORT O2 POM ELITE CURAPLEX A/

## (undated) DEVICE — TUBING, SUCTION, 1/4" X 10', STRAIGHT: Brand: MEDLINE

## (undated) DEVICE — Device

## (undated) DEVICE — Device: Brand: DEFENDO AIR/WATER/SUCTION AND BIOPSY VALVE

## (undated) DEVICE — JELLY,LUBE,STERILE,FLIP TOP,TUBE,2-OZ: Brand: MEDLINE